# Patient Record
Sex: FEMALE | Race: WHITE | NOT HISPANIC OR LATINO | Employment: OTHER | URBAN - METROPOLITAN AREA
[De-identification: names, ages, dates, MRNs, and addresses within clinical notes are randomized per-mention and may not be internally consistent; named-entity substitution may affect disease eponyms.]

---

## 2016-12-30 ASSESSMENT — MIFFLIN-ST. JEOR: SCORE: 1388.94

## 2017-01-05 ENCOUNTER — SURGERY - HEALTHEAST (OUTPATIENT)
Dept: SURGERY | Facility: CLINIC | Age: 77
End: 2017-01-05

## 2017-01-05 ENCOUNTER — ANESTHESIA - HEALTHEAST (OUTPATIENT)
Dept: SURGERY | Facility: CLINIC | Age: 77
End: 2017-01-05

## 2017-01-05 ENCOUNTER — HOME CARE/HOSPICE - HEALTHEAST (OUTPATIENT)
Dept: HOME HEALTH SERVICES | Facility: HOME HEALTH | Age: 77
End: 2017-01-05

## 2017-01-05 ASSESSMENT — MIFFLIN-ST. JEOR: SCORE: 1388.94

## 2017-01-09 ENCOUNTER — OFFICE VISIT - HEALTHEAST (OUTPATIENT)
Dept: GERIATRICS | Facility: CLINIC | Age: 77
End: 2017-01-09

## 2017-01-09 DIAGNOSIS — D62 ACUTE BLOOD LOSS AS CAUSE OF POSTOPERATIVE ANEMIA: ICD-10-CM

## 2017-01-09 DIAGNOSIS — E78.5 HYPERLIPIDEMIA, UNSPECIFIED HYPERLIPIDEMIA TYPE: ICD-10-CM

## 2017-01-09 DIAGNOSIS — I10 ESSENTIAL HYPERTENSION WITH GOAL BLOOD PRESSURE LESS THAN 130/80: ICD-10-CM

## 2017-01-09 DIAGNOSIS — Z96.652 STATUS POST TOTAL LEFT KNEE REPLACEMENT: ICD-10-CM

## 2017-01-11 ENCOUNTER — OFFICE VISIT - HEALTHEAST (OUTPATIENT)
Dept: GERIATRICS | Facility: CLINIC | Age: 77
End: 2017-01-11

## 2017-01-11 DIAGNOSIS — Z96.652 STATUS POST TOTAL LEFT KNEE REPLACEMENT: ICD-10-CM

## 2017-01-11 DIAGNOSIS — D62 ACUTE BLOOD LOSS AS CAUSE OF POSTOPERATIVE ANEMIA: ICD-10-CM

## 2017-01-11 DIAGNOSIS — I10 ESSENTIAL HYPERTENSION WITH GOAL BLOOD PRESSURE LESS THAN 130/80: ICD-10-CM

## 2017-01-11 DIAGNOSIS — E78.5 HYPERLIPIDEMIA, UNSPECIFIED HYPERLIPIDEMIA TYPE: ICD-10-CM

## 2017-01-16 ENCOUNTER — AMBULATORY - HEALTHEAST (OUTPATIENT)
Dept: GERIATRICS | Facility: CLINIC | Age: 77
End: 2017-01-16

## 2017-06-19 ENCOUNTER — RECORDS - HEALTHEAST (OUTPATIENT)
Dept: LAB | Facility: CLINIC | Age: 77
End: 2017-06-19

## 2017-06-19 LAB
CHOLEST SERPL-MCNC: 215 MG/DL
FASTING STATUS PATIENT QL REPORTED: NO
HDLC SERPL-MCNC: 56 MG/DL
LDLC SERPL CALC-MCNC: 140 MG/DL
TRIGL SERPL-MCNC: 93 MG/DL

## 2017-06-21 LAB
LAB AP CHARGES (HE HISTORICAL CONVERSION): NORMAL
PATH REPORT.COMMENTS IMP SPEC: NORMAL
PATH REPORT.FINAL DX SPEC: NORMAL
PATH REPORT.GROSS SPEC: NORMAL
PATH REPORT.MICROSCOPIC SPEC OTHER STN: NORMAL
PATH REPORT.RELEVANT HX SPEC: NORMAL
RESULT FLAG (HE HISTORICAL CONVERSION): NORMAL

## 2017-08-25 ENCOUNTER — RECORDS - HEALTHEAST (OUTPATIENT)
Dept: LAB | Facility: CLINIC | Age: 77
End: 2017-08-25

## 2017-08-25 LAB
CHOLEST SERPL-MCNC: 149 MG/DL
FASTING STATUS PATIENT QL REPORTED: NO
HDLC SERPL-MCNC: 59 MG/DL
LDLC SERPL CALC-MCNC: 79 MG/DL
TRIGL SERPL-MCNC: 54 MG/DL

## 2017-08-30 ENCOUNTER — RECORDS - HEALTHEAST (OUTPATIENT)
Dept: ADMINISTRATIVE | Facility: OTHER | Age: 77
End: 2017-08-30

## 2018-01-08 ENCOUNTER — RECORDS - HEALTHEAST (OUTPATIENT)
Dept: LAB | Facility: CLINIC | Age: 78
End: 2018-01-08

## 2018-01-08 LAB
ALBUMIN SERPL-MCNC: 3.7 G/DL (ref 3.5–5)
ALP SERPL-CCNC: 71 U/L (ref 45–120)
ALT SERPL W P-5'-P-CCNC: 15 U/L (ref 0–45)
ANION GAP SERPL CALCULATED.3IONS-SCNC: 8 MMOL/L (ref 5–18)
AST SERPL W P-5'-P-CCNC: 16 U/L (ref 0–40)
BILIRUB SERPL-MCNC: 0.4 MG/DL (ref 0–1)
BUN SERPL-MCNC: 13 MG/DL (ref 8–28)
CALCIUM SERPL-MCNC: 9.8 MG/DL (ref 8.5–10.5)
CHLORIDE BLD-SCNC: 105 MMOL/L (ref 98–107)
CO2 SERPL-SCNC: 27 MMOL/L (ref 22–31)
CREAT SERPL-MCNC: 0.88 MG/DL (ref 0.6–1.1)
GFR SERPL CREATININE-BSD FRML MDRD: >60 ML/MIN/1.73M2
GLUCOSE BLD-MCNC: 101 MG/DL (ref 70–125)
POTASSIUM BLD-SCNC: 4.5 MMOL/L (ref 3.5–5)
PROT SERPL-MCNC: 6.8 G/DL (ref 6–8)
SODIUM SERPL-SCNC: 140 MMOL/L (ref 136–145)

## 2018-06-11 ENCOUNTER — RECORDS - HEALTHEAST (OUTPATIENT)
Dept: LAB | Facility: CLINIC | Age: 78
End: 2018-06-11

## 2018-06-11 LAB
ALBUMIN SERPL-MCNC: 4 G/DL (ref 3.5–5)
ALP SERPL-CCNC: 68 U/L (ref 45–120)
ALT SERPL W P-5'-P-CCNC: 13 U/L (ref 0–45)
ANION GAP SERPL CALCULATED.3IONS-SCNC: 10 MMOL/L (ref 5–18)
AST SERPL W P-5'-P-CCNC: 19 U/L (ref 0–40)
BILIRUB SERPL-MCNC: 0.5 MG/DL (ref 0–1)
BUN SERPL-MCNC: 14 MG/DL (ref 8–28)
CALCIUM SERPL-MCNC: 9.8 MG/DL (ref 8.5–10.5)
CHLORIDE BLD-SCNC: 105 MMOL/L (ref 98–107)
CO2 SERPL-SCNC: 24 MMOL/L (ref 22–31)
CREAT SERPL-MCNC: 0.93 MG/DL (ref 0.6–1.1)
GFR SERPL CREATININE-BSD FRML MDRD: 58 ML/MIN/1.73M2
GLUCOSE BLD-MCNC: 95 MG/DL (ref 70–125)
POTASSIUM BLD-SCNC: 4.4 MMOL/L (ref 3.5–5)
PROT SERPL-MCNC: 6.8 G/DL (ref 6–8)
SODIUM SERPL-SCNC: 139 MMOL/L (ref 136–145)

## 2019-02-25 ENCOUNTER — RECORDS - HEALTHEAST (OUTPATIENT)
Dept: ADMINISTRATIVE | Facility: OTHER | Age: 79
End: 2019-02-25

## 2019-02-25 LAB
LAB AP CHARGES (HE HISTORICAL CONVERSION): NORMAL
PATH REPORT.COMMENTS IMP SPEC: NORMAL
PATH REPORT.COMMENTS IMP SPEC: NORMAL
PATH REPORT.FINAL DX SPEC: NORMAL
PATH REPORT.GROSS SPEC: NORMAL
PATH REPORT.MICROSCOPIC SPEC OTHER STN: NORMAL
PATH REPORT.RELEVANT HX SPEC: NORMAL
RESULT FLAG (HE HISTORICAL CONVERSION): NORMAL

## 2019-05-21 ENCOUNTER — RECORDS - HEALTHEAST (OUTPATIENT)
Dept: LAB | Facility: CLINIC | Age: 79
End: 2019-05-21

## 2019-05-21 LAB
C REACTIVE PROTEIN LHE: <0.1 MG/DL (ref 0–0.8)
CK SERPL-CCNC: 96 U/L (ref 30–190)
ERYTHROCYTE [SEDIMENTATION RATE] IN BLOOD BY WESTERGREN METHOD: 7 MM/HR (ref 0–20)
TSH SERPL DL<=0.005 MIU/L-ACNC: 1.78 UIU/ML (ref 0.3–5)

## 2019-05-22 LAB — B BURGDOR IGG+IGM SER QL: 0.05 INDEX VALUE

## 2020-01-23 ENCOUNTER — RECORDS - HEALTHEAST (OUTPATIENT)
Dept: ADMINISTRATIVE | Facility: OTHER | Age: 80
End: 2020-01-23

## 2020-01-27 ENCOUNTER — HOSPITAL ENCOUNTER (OUTPATIENT)
Dept: CARDIOLOGY | Facility: CLINIC | Age: 80
Discharge: HOME | End: 2020-01-27
Attending: FAMILY MEDICINE

## 2020-01-27 DIAGNOSIS — R00.0 SINUS TACHYCARDIA: ICD-10-CM

## 2020-03-12 ENCOUNTER — RECORDS - HEALTHEAST (OUTPATIENT)
Dept: ADMINISTRATIVE | Facility: OTHER | Age: 80
End: 2020-03-12

## 2020-04-23 ENCOUNTER — RECORDS - HEALTHEAST (OUTPATIENT)
Dept: LAB | Facility: CLINIC | Age: 80
End: 2020-04-23

## 2020-04-23 LAB
ANION GAP SERPL CALCULATED.3IONS-SCNC: 8 MMOL/L (ref 5–18)
BUN SERPL-MCNC: 9 MG/DL (ref 8–28)
CALCIUM SERPL-MCNC: 9.6 MG/DL (ref 8.5–10.5)
CHLORIDE BLD-SCNC: 103 MMOL/L (ref 98–107)
CO2 SERPL-SCNC: 28 MMOL/L (ref 22–31)
CREAT SERPL-MCNC: 0.89 MG/DL (ref 0.6–1.1)
GFR SERPL CREATININE-BSD FRML MDRD: >60 ML/MIN/1.73M2
GLUCOSE BLD-MCNC: 102 MG/DL (ref 70–125)
POTASSIUM BLD-SCNC: 4.8 MMOL/L (ref 3.5–5)
SODIUM SERPL-SCNC: 139 MMOL/L (ref 136–145)
VIT B12 SERPL-MCNC: 522 PG/ML (ref 213–816)

## 2020-05-18 ENCOUNTER — AMBULATORY - HEALTHEAST (OUTPATIENT)
Dept: CARDIOLOGY | Facility: CLINIC | Age: 80
End: 2020-05-18

## 2020-05-18 ENCOUNTER — RECORDS - HEALTHEAST (OUTPATIENT)
Dept: ADMINISTRATIVE | Facility: OTHER | Age: 80
End: 2020-05-18

## 2020-05-19 ENCOUNTER — RECORDS - HEALTHEAST (OUTPATIENT)
Dept: ADMINISTRATIVE | Facility: OTHER | Age: 80
End: 2020-05-19

## 2020-05-19 ENCOUNTER — AMBULATORY - HEALTHEAST (OUTPATIENT)
Dept: CARDIOLOGY | Facility: CLINIC | Age: 80
End: 2020-05-19

## 2020-05-22 ENCOUNTER — COMMUNICATION - HEALTHEAST (OUTPATIENT)
Dept: NEUROSURGERY | Facility: CLINIC | Age: 80
End: 2020-05-22

## 2020-05-26 ENCOUNTER — OFFICE VISIT - HEALTHEAST (OUTPATIENT)
Dept: CARDIOLOGY | Facility: CLINIC | Age: 80
End: 2020-05-26

## 2020-05-26 DIAGNOSIS — R55 SYNCOPE: ICD-10-CM

## 2020-06-05 ENCOUNTER — COMMUNICATION - HEALTHEAST (OUTPATIENT)
Dept: CARDIOLOGY | Facility: CLINIC | Age: 80
End: 2020-06-05

## 2020-06-09 ENCOUNTER — HOSPITAL ENCOUNTER (OUTPATIENT)
Dept: CARDIOLOGY | Facility: CLINIC | Age: 80
Discharge: HOME | End: 2020-06-09
Attending: INTERNAL MEDICINE

## 2020-06-09 ENCOUNTER — COMMUNICATION - HEALTHEAST (OUTPATIENT)
Dept: CARDIOLOGY | Facility: CLINIC | Age: 80
End: 2020-06-09

## 2020-06-09 ENCOUNTER — RECORDS - HEALTHEAST (OUTPATIENT)
Dept: ADMINISTRATIVE | Facility: OTHER | Age: 80
End: 2020-06-09

## 2020-06-09 DIAGNOSIS — R55 SYNCOPE: ICD-10-CM

## 2020-06-09 LAB
AORTIC ROOT: 3.6 CM
AR DECEL SLOPE: 2320 MM/S2
AR PEAK VELOCITY: 493 CM/S
AV REGURGITANT PEAK GRADIENT: 97.2 MMHG
AV REGURGITATION PRESSURE HALF TIME: 468 MS
BSA FOR ECHO PROCEDURE: 1.93 M2
CV BLOOD PRESSURE: ABNORMAL MMHG
CV ECHO HEIGHT: 68 IN
CV ECHO WEIGHT: 172 LBS
DOP CALC LVOT AREA: 2.54 CM2
DOP CALC LVOT DIAMETER: 1.8 CM
DOP CALC LVOT PEAK VEL: 91.1 CM/S
DOP CALC LVOT STROKE VOLUME: 48.3 CM3
DOP CALCLVOT PEAK VEL VTI: 19 CM
EJECTION FRACTION: 63 % (ref 55–75)
FRACTIONAL SHORTENING: 28.7 % (ref 28–44)
INTERVENTRICULAR SEPTUM IN END DIASTOLE: 1.01 CM (ref 0.6–0.9)
IVS/PW RATIO: 1
LA AREA 1: 16.4 CM2
LA AREA 2: 12.9 CM2
LEFT ATRIUM LENGTH: 4 CM
LEFT ATRIUM SIZE: 3.3 CM
LEFT ATRIUM TO AORTIC ROOT RATIO: 0.92 NO UNITS
LEFT ATRIUM VOLUME INDEX: 23.3 ML/M2
LEFT ATRIUM VOLUME: 45 ML
LEFT VENTRICLE DIASTOLIC VOLUME INDEX: 40.4 CM3/M2 (ref 29–61)
LEFT VENTRICLE DIASTOLIC VOLUME: 78 CM3 (ref 46–106)
LEFT VENTRICLE MASS INDEX: 69.7 G/M2
LEFT VENTRICLE SYSTOLIC VOLUME INDEX: 15 CM3/M2 (ref 8–24)
LEFT VENTRICLE SYSTOLIC VOLUME: 29 CM3 (ref 14–42)
LEFT VENTRICULAR INTERNAL DIMENSION IN DIASTOLE: 4.18 CM (ref 3.8–5.2)
LEFT VENTRICULAR INTERNAL DIMENSION IN SYSTOLE: 2.98 CM (ref 2.2–3.5)
LEFT VENTRICULAR MASS: 134.5 G
LEFT VENTRICULAR OUTFLOW TRACT MEAN GRADIENT: 2 MMHG
LEFT VENTRICULAR OUTFLOW TRACT MEAN VELOCITY: 57.6 CM/S
LEFT VENTRICULAR OUTFLOW TRACT PEAK GRADIENT: 3 MMHG
LEFT VENTRICULAR POSTERIOR WALL IN END DIASTOLE: 0.97 CM (ref 0.6–0.9)
LV STROKE VOLUME INDEX: 25 ML/M2
MITRAL VALVE E/A RATIO: 0.9
MV AVERAGE E/E' RATIO: 8.7 CM/S
MV DECELERATION TIME: 236 MS
MV E'TISSUE VEL-LAT: 8.38 CM/S
MV E'TISSUE VEL-MED: 6.24 CM/S
MV LATERAL E/E' RATIO: 7.6
MV MEDIAL E/E' RATIO: 10.2
MV PEAK A VELOCITY: 67.6 CM/S
MV PEAK E VELOCITY: 63.7 CM/S
NUC REST DIASTOLIC VOLUME INDEX: 2752 LBS
NUC REST SYSTOLIC VOLUME INDEX: 68 IN
TRICUSPID REGURGITATION PEAK PRESSURE GRADIENT: 27.5 MMHG
TRICUSPID VALVE ANULAR PLANE SYSTOLIC EXCURSION: 2.6 CM
TRICUSPID VALVE PEAK REGURGITANT VELOCITY: 262 CM/S

## 2020-06-09 ASSESSMENT — MIFFLIN-ST. JEOR: SCORE: 1293.69

## 2020-06-11 ENCOUNTER — COMMUNICATION - HEALTHEAST (OUTPATIENT)
Dept: CARDIOLOGY | Facility: CLINIC | Age: 80
End: 2020-06-11

## 2020-06-15 ENCOUNTER — COMMUNICATION - HEALTHEAST (OUTPATIENT)
Dept: CARDIOLOGY | Facility: CLINIC | Age: 80
End: 2020-06-15

## 2020-06-17 ENCOUNTER — OFFICE VISIT - HEALTHEAST (OUTPATIENT)
Dept: CARDIOLOGY | Facility: CLINIC | Age: 80
End: 2020-06-17

## 2020-06-17 DIAGNOSIS — R42 DIZZINESS: ICD-10-CM

## 2020-06-17 DIAGNOSIS — I35.1 AORTIC VALVE INSUFFICIENCY, ETIOLOGY OF CARDIAC VALVE DISEASE UNSPECIFIED: ICD-10-CM

## 2020-06-17 DIAGNOSIS — I10 ESSENTIAL HYPERTENSION WITH GOAL BLOOD PRESSURE LESS THAN 130/80: ICD-10-CM

## 2020-06-22 ENCOUNTER — AMBULATORY - HEALTHEAST (OUTPATIENT)
Dept: OTHER | Facility: CLINIC | Age: 80
End: 2020-06-22

## 2020-06-26 ENCOUNTER — OFFICE VISIT - HEALTHEAST (OUTPATIENT)
Dept: GERIATRICS | Facility: CLINIC | Age: 80
End: 2020-06-26

## 2020-06-26 DIAGNOSIS — G93.41 METABOLIC ENCEPHALOPATHY: ICD-10-CM

## 2020-06-26 DIAGNOSIS — R26.89 SHUFFLING GAIT: ICD-10-CM

## 2020-06-26 DIAGNOSIS — M48.00 SPINAL STENOSIS, UNSPECIFIED SPINAL REGION: ICD-10-CM

## 2020-06-26 DIAGNOSIS — R33.9 URINARY RETENTION: ICD-10-CM

## 2020-06-26 DIAGNOSIS — E53.8 VITAMIN B12 DEFICIENCY: ICD-10-CM

## 2020-06-30 ENCOUNTER — RECORDS - HEALTHEAST (OUTPATIENT)
Dept: LAB | Facility: CLINIC | Age: 80
End: 2020-06-30

## 2020-06-30 ENCOUNTER — OFFICE VISIT - HEALTHEAST (OUTPATIENT)
Dept: GERIATRICS | Facility: CLINIC | Age: 80
End: 2020-06-30

## 2020-06-30 DIAGNOSIS — E53.8 VITAMIN B12 DEFICIENCY: ICD-10-CM

## 2020-06-30 DIAGNOSIS — G93.41 METABOLIC ENCEPHALOPATHY: ICD-10-CM

## 2020-06-30 DIAGNOSIS — N30.01 ACUTE CYSTITIS WITH HEMATURIA: ICD-10-CM

## 2020-06-30 DIAGNOSIS — R33.9 URINARY RETENTION: ICD-10-CM

## 2020-06-30 DIAGNOSIS — M48.00 SPINAL STENOSIS, UNSPECIFIED SPINAL REGION: ICD-10-CM

## 2020-06-30 DIAGNOSIS — R26.89 SHUFFLING GAIT: ICD-10-CM

## 2020-06-30 LAB
ALBUMIN UR-MCNC: ABNORMAL MG/DL
ANION GAP SERPL CALCULATED.3IONS-SCNC: 8 MMOL/L (ref 5–18)
APPEARANCE UR: ABNORMAL
BACTERIA #/AREA URNS HPF: ABNORMAL HPF
BILIRUB UR QL STRIP: NEGATIVE
BUN SERPL-MCNC: 16 MG/DL (ref 8–28)
CALCIUM SERPL-MCNC: 9 MG/DL (ref 8.5–10.5)
CAOX CRY #/AREA URNS HPF: PRESENT /[HPF]
CHLORIDE BLD-SCNC: 107 MMOL/L (ref 98–107)
CO2 SERPL-SCNC: 26 MMOL/L (ref 22–31)
COLOR UR AUTO: YELLOW
CREAT SERPL-MCNC: 0.88 MG/DL (ref 0.6–1.1)
ERYTHROCYTE [DISTWIDTH] IN BLOOD BY AUTOMATED COUNT: 12.3 % (ref 11–14.5)
GFR SERPL CREATININE-BSD FRML MDRD: >60 ML/MIN/1.73M2
GLUCOSE BLD-MCNC: 91 MG/DL (ref 70–125)
GLUCOSE UR STRIP-MCNC: NEGATIVE MG/DL
HCT VFR BLD AUTO: 37.2 % (ref 35–47)
HGB BLD-MCNC: 11.7 G/DL (ref 12–16)
HGB UR QL STRIP: ABNORMAL
KETONES UR STRIP-MCNC: NEGATIVE MG/DL
LEUKOCYTE ESTERASE UR QL STRIP: ABNORMAL
MCH RBC QN AUTO: 30.7 PG (ref 27–34)
MCHC RBC AUTO-ENTMCNC: 31.5 G/DL (ref 32–36)
MCV RBC AUTO: 98 FL (ref 80–100)
MUCOUS THREADS #/AREA URNS LPF: ABNORMAL LPF
NITRATE UR QL: POSITIVE
PH UR STRIP: 5.5 [PH] (ref 4.5–8)
PLATELET # BLD AUTO: 234 THOU/UL (ref 140–440)
PMV BLD AUTO: 10 FL (ref 8.5–12.5)
POTASSIUM BLD-SCNC: 3.9 MMOL/L (ref 3.5–5)
RBC # BLD AUTO: 3.81 MILL/UL (ref 3.8–5.4)
RBC #/AREA URNS AUTO: ABNORMAL HPF
SODIUM SERPL-SCNC: 141 MMOL/L (ref 136–145)
SP GR UR STRIP: 1.02 (ref 1–1.03)
SQUAMOUS #/AREA URNS AUTO: ABNORMAL LPF
UROBILINOGEN UR STRIP-ACNC: ABNORMAL
WBC #/AREA URNS AUTO: ABNORMAL HPF
WBC CLUMPS #/AREA URNS HPF: PRESENT /[HPF]
WBC: 8.6 THOU/UL (ref 4–11)

## 2020-07-01 ENCOUNTER — RECORDS - HEALTHEAST (OUTPATIENT)
Dept: LAB | Facility: CLINIC | Age: 80
End: 2020-07-01

## 2020-07-01 LAB
ALBUMIN UR-MCNC: ABNORMAL MG/DL
APPEARANCE UR: ABNORMAL
BACTERIA #/AREA URNS HPF: ABNORMAL HPF
BACTERIA SPEC CULT: ABNORMAL
BILIRUB UR QL STRIP: NEGATIVE
COLOR UR AUTO: YELLOW
GLUCOSE UR STRIP-MCNC: NEGATIVE MG/DL
HGB UR QL STRIP: ABNORMAL
KETONES UR STRIP-MCNC: NEGATIVE MG/DL
LEUKOCYTE ESTERASE UR QL STRIP: ABNORMAL
MUCOUS THREADS #/AREA URNS LPF: ABNORMAL LPF
NITRATE UR QL: NEGATIVE
PH UR STRIP: 8 [PH] (ref 4.5–8)
RBC #/AREA URNS AUTO: ABNORMAL HPF
SP GR UR STRIP: 1.02 (ref 1–1.03)
SQUAMOUS #/AREA URNS AUTO: ABNORMAL LPF
UROBILINOGEN UR STRIP-ACNC: ABNORMAL
WBC #/AREA URNS AUTO: ABNORMAL HPF

## 2020-07-02 ENCOUNTER — OFFICE VISIT - HEALTHEAST (OUTPATIENT)
Dept: GERIATRICS | Facility: CLINIC | Age: 80
End: 2020-07-02

## 2020-07-02 DIAGNOSIS — N30.01 ACUTE CYSTITIS WITH HEMATURIA: ICD-10-CM

## 2020-07-02 DIAGNOSIS — G93.41 METABOLIC ENCEPHALOPATHY: ICD-10-CM

## 2020-07-02 DIAGNOSIS — R33.9 URINARY RETENTION: ICD-10-CM

## 2020-07-02 DIAGNOSIS — R26.89 SHUFFLING GAIT: ICD-10-CM

## 2020-07-02 DIAGNOSIS — M48.00 SPINAL STENOSIS, UNSPECIFIED SPINAL REGION: ICD-10-CM

## 2020-07-02 DIAGNOSIS — E53.8 VITAMIN B12 DEFICIENCY: ICD-10-CM

## 2020-07-02 LAB
ANION GAP SERPL CALCULATED.3IONS-SCNC: 7 MMOL/L (ref 5–18)
BUN SERPL-MCNC: 18 MG/DL (ref 8–28)
CALCIUM SERPL-MCNC: 9.4 MG/DL (ref 8.5–10.5)
CHLORIDE BLD-SCNC: 106 MMOL/L (ref 98–107)
CO2 SERPL-SCNC: 27 MMOL/L (ref 22–31)
CREAT SERPL-MCNC: 0.84 MG/DL (ref 0.6–1.1)
ERYTHROCYTE [DISTWIDTH] IN BLOOD BY AUTOMATED COUNT: 12.1 % (ref 11–14.5)
GFR SERPL CREATININE-BSD FRML MDRD: >60 ML/MIN/1.73M2
GLUCOSE BLD-MCNC: 88 MG/DL (ref 70–125)
HCT VFR BLD AUTO: 37.2 % (ref 35–47)
HGB BLD-MCNC: 11.7 G/DL (ref 12–16)
MCH RBC QN AUTO: 30.7 PG (ref 27–34)
MCHC RBC AUTO-ENTMCNC: 31.5 G/DL (ref 32–36)
MCV RBC AUTO: 98 FL (ref 80–100)
PLATELET # BLD AUTO: 251 THOU/UL (ref 140–440)
PMV BLD AUTO: 10.1 FL (ref 8.5–12.5)
POTASSIUM BLD-SCNC: 4.2 MMOL/L (ref 3.5–5)
RBC # BLD AUTO: 3.81 MILL/UL (ref 3.8–5.4)
SODIUM SERPL-SCNC: 140 MMOL/L (ref 136–145)
WBC: 7.3 THOU/UL (ref 4–11)

## 2020-07-04 LAB — BACTERIA SPEC CULT: ABNORMAL

## 2020-07-07 ENCOUNTER — OFFICE VISIT - HEALTHEAST (OUTPATIENT)
Dept: GERIATRICS | Facility: CLINIC | Age: 80
End: 2020-07-07

## 2020-07-07 ENCOUNTER — OFFICE VISIT - HEALTHEAST (OUTPATIENT)
Dept: CARDIOLOGY | Facility: CLINIC | Age: 80
End: 2020-07-07

## 2020-07-07 DIAGNOSIS — R42 DIZZINESS: ICD-10-CM

## 2020-07-07 DIAGNOSIS — R53.1 WEAKNESS: ICD-10-CM

## 2020-07-07 DIAGNOSIS — R33.9 URINARY RETENTION: ICD-10-CM

## 2020-07-07 DIAGNOSIS — R26.89 SHUFFLING GAIT: ICD-10-CM

## 2020-07-07 DIAGNOSIS — N30.01 ACUTE CYSTITIS WITH HEMATURIA: ICD-10-CM

## 2020-07-07 DIAGNOSIS — E53.8 VITAMIN B12 DEFICIENCY: ICD-10-CM

## 2020-07-07 DIAGNOSIS — M48.00 SPINAL STENOSIS, UNSPECIFIED SPINAL REGION: ICD-10-CM

## 2020-07-07 DIAGNOSIS — G93.41 METABOLIC ENCEPHALOPATHY: ICD-10-CM

## 2020-07-09 ENCOUNTER — OFFICE VISIT - HEALTHEAST (OUTPATIENT)
Dept: GERIATRICS | Facility: CLINIC | Age: 80
End: 2020-07-09

## 2020-07-09 DIAGNOSIS — E53.8 VITAMIN B12 DEFICIENCY: ICD-10-CM

## 2020-07-09 DIAGNOSIS — R26.89 SHUFFLING GAIT: ICD-10-CM

## 2020-07-09 DIAGNOSIS — G93.41 METABOLIC ENCEPHALOPATHY: ICD-10-CM

## 2020-07-09 DIAGNOSIS — R33.9 URINARY RETENTION: ICD-10-CM

## 2020-07-09 DIAGNOSIS — M48.00 SPINAL STENOSIS, UNSPECIFIED SPINAL REGION: ICD-10-CM

## 2020-07-09 DIAGNOSIS — N30.01 ACUTE CYSTITIS WITH HEMATURIA: ICD-10-CM

## 2020-07-12 ENCOUNTER — RECORDS - HEALTHEAST (OUTPATIENT)
Dept: LAB | Facility: CLINIC | Age: 80
End: 2020-07-12

## 2020-07-12 LAB
ALBUMIN UR-MCNC: NEGATIVE MG/DL
APPEARANCE UR: CLEAR
BILIRUB UR QL STRIP: NEGATIVE
COLOR UR AUTO: NORMAL
GLUCOSE UR STRIP-MCNC: NEGATIVE MG/DL
HGB UR QL STRIP: NEGATIVE
KETONES UR STRIP-MCNC: NEGATIVE MG/DL
LEUKOCYTE ESTERASE UR QL STRIP: NEGATIVE
NITRATE UR QL: NEGATIVE
PH UR STRIP: 6 [PH] (ref 4.5–8)
SP GR UR STRIP: 1 (ref 1–1.03)
UROBILINOGEN UR STRIP-ACNC: NORMAL

## 2020-07-14 ENCOUNTER — OFFICE VISIT - HEALTHEAST (OUTPATIENT)
Dept: GERIATRICS | Facility: CLINIC | Age: 80
End: 2020-07-14

## 2020-07-14 DIAGNOSIS — G20.A1 PARKINSON'S DISEASE (H): ICD-10-CM

## 2020-07-14 DIAGNOSIS — E53.8 VITAMIN B12 DEFICIENCY: ICD-10-CM

## 2020-07-14 DIAGNOSIS — R33.9 URINARY RETENTION: ICD-10-CM

## 2020-07-14 DIAGNOSIS — N30.01 ACUTE CYSTITIS WITH HEMATURIA: ICD-10-CM

## 2020-07-14 DIAGNOSIS — G93.41 METABOLIC ENCEPHALOPATHY: ICD-10-CM

## 2020-07-14 DIAGNOSIS — M48.00 SPINAL STENOSIS, UNSPECIFIED SPINAL REGION: ICD-10-CM

## 2020-07-14 RX ORDER — CARBIDOPA AND LEVODOPA 25; 100 MG/1; MG/1
1 TABLET ORAL 2 TIMES DAILY
Status: SHIPPED | COMMUNITY
Start: 2020-07-14

## 2020-07-16 ENCOUNTER — RECORDS - HEALTHEAST (OUTPATIENT)
Dept: LAB | Facility: CLINIC | Age: 80
End: 2020-07-16

## 2020-07-16 ENCOUNTER — AMBULATORY - HEALTHEAST (OUTPATIENT)
Dept: OTHER | Facility: CLINIC | Age: 80
End: 2020-07-16

## 2020-07-16 ENCOUNTER — OFFICE VISIT - HEALTHEAST (OUTPATIENT)
Dept: GERIATRICS | Facility: CLINIC | Age: 80
End: 2020-07-16

## 2020-07-16 DIAGNOSIS — R33.9 URINARY RETENTION: ICD-10-CM

## 2020-07-16 DIAGNOSIS — G20.A1 PARKINSON'S DISEASE (H): ICD-10-CM

## 2020-07-16 DIAGNOSIS — N30.01 ACUTE CYSTITIS WITH HEMATURIA: ICD-10-CM

## 2020-07-16 DIAGNOSIS — G93.41 METABOLIC ENCEPHALOPATHY: ICD-10-CM

## 2020-07-16 DIAGNOSIS — M48.00 SPINAL STENOSIS, UNSPECIFIED SPINAL REGION: ICD-10-CM

## 2020-07-16 DIAGNOSIS — I95.1 ORTHOSTATIC HYPOTENSION: ICD-10-CM

## 2020-07-16 LAB
ANION GAP SERPL CALCULATED.3IONS-SCNC: 8 MMOL/L (ref 5–18)
BASOPHILS # BLD AUTO: 0 THOU/UL (ref 0–0.2)
BASOPHILS NFR BLD AUTO: 0 % (ref 0–2)
BUN SERPL-MCNC: 9 MG/DL (ref 8–28)
CALCIUM SERPL-MCNC: 9.1 MG/DL (ref 8.5–10.5)
CHLORIDE BLD-SCNC: 105 MMOL/L (ref 98–107)
CO2 SERPL-SCNC: 26 MMOL/L (ref 22–31)
CREAT SERPL-MCNC: 0.85 MG/DL (ref 0.6–1.1)
EOSINOPHIL # BLD AUTO: 0.1 THOU/UL (ref 0–0.4)
EOSINOPHIL NFR BLD AUTO: 1 % (ref 0–6)
ERYTHROCYTE [DISTWIDTH] IN BLOOD BY AUTOMATED COUNT: 12.3 % (ref 11–14.5)
GFR SERPL CREATININE-BSD FRML MDRD: >60 ML/MIN/1.73M2
GLUCOSE BLD-MCNC: 100 MG/DL (ref 70–125)
HCT VFR BLD AUTO: 38.9 % (ref 35–47)
HGB BLD-MCNC: 12.7 G/DL (ref 12–16)
LYMPHOCYTES # BLD AUTO: 1 THOU/UL (ref 0.8–4.4)
LYMPHOCYTES NFR BLD AUTO: 16 % (ref 20–40)
MCH RBC QN AUTO: 31.3 PG (ref 27–34)
MCHC RBC AUTO-ENTMCNC: 32.6 G/DL (ref 32–36)
MCV RBC AUTO: 96 FL (ref 80–100)
MONOCYTES # BLD AUTO: 0.6 THOU/UL (ref 0–0.9)
MONOCYTES NFR BLD AUTO: 11 % (ref 2–10)
NEUTROPHILS # BLD AUTO: 4.2 THOU/UL (ref 2–7.7)
NEUTROPHILS NFR BLD AUTO: 71 % (ref 50–70)
PLATELET # BLD AUTO: 282 THOU/UL (ref 140–440)
PMV BLD AUTO: 10.5 FL (ref 8.5–12.5)
POTASSIUM BLD-SCNC: 4.4 MMOL/L (ref 3.5–5)
RBC # BLD AUTO: 4.06 MILL/UL (ref 3.8–5.4)
SODIUM SERPL-SCNC: 139 MMOL/L (ref 136–145)
WBC: 5.9 THOU/UL (ref 4–11)

## 2020-07-21 ENCOUNTER — OFFICE VISIT - HEALTHEAST (OUTPATIENT)
Dept: GERIATRICS | Facility: CLINIC | Age: 80
End: 2020-07-21

## 2020-07-21 DIAGNOSIS — R33.9 URINARY RETENTION: ICD-10-CM

## 2020-07-21 DIAGNOSIS — I95.1 ORTHOSTATIC HYPOTENSION: ICD-10-CM

## 2020-07-21 DIAGNOSIS — G20.A1 PARKINSON DISEASE (H): ICD-10-CM

## 2020-07-21 DIAGNOSIS — N30.01 ACUTE CYSTITIS WITH HEMATURIA: ICD-10-CM

## 2020-07-21 DIAGNOSIS — G93.41 METABOLIC ENCEPHALOPATHY: ICD-10-CM

## 2020-07-21 DIAGNOSIS — M48.00 SPINAL STENOSIS, UNSPECIFIED SPINAL REGION: ICD-10-CM

## 2020-07-22 ENCOUNTER — RECORDS - HEALTHEAST (OUTPATIENT)
Dept: LAB | Facility: CLINIC | Age: 80
End: 2020-07-22

## 2020-07-23 ENCOUNTER — OFFICE VISIT - HEALTHEAST (OUTPATIENT)
Dept: GERIATRICS | Facility: CLINIC | Age: 80
End: 2020-07-23

## 2020-07-23 DIAGNOSIS — M48.00 SPINAL STENOSIS, UNSPECIFIED SPINAL REGION: ICD-10-CM

## 2020-07-23 DIAGNOSIS — R33.9 URINARY RETENTION: ICD-10-CM

## 2020-07-23 DIAGNOSIS — G93.41 METABOLIC ENCEPHALOPATHY: ICD-10-CM

## 2020-07-23 DIAGNOSIS — G20.A1 PARKINSON DISEASE (H): ICD-10-CM

## 2020-07-23 DIAGNOSIS — N30.01 ACUTE CYSTITIS WITH HEMATURIA: ICD-10-CM

## 2020-07-23 DIAGNOSIS — I95.1 ORTHOSTATIC HYPOTENSION: ICD-10-CM

## 2020-07-23 LAB
ANION GAP SERPL CALCULATED.3IONS-SCNC: 6 MMOL/L (ref 5–18)
BUN SERPL-MCNC: 13 MG/DL (ref 8–28)
CALCIUM SERPL-MCNC: 9.3 MG/DL (ref 8.5–10.5)
CHLORIDE BLD-SCNC: 105 MMOL/L (ref 98–107)
CO2 SERPL-SCNC: 27 MMOL/L (ref 22–31)
CREAT SERPL-MCNC: 0.81 MG/DL (ref 0.6–1.1)
ERYTHROCYTE [DISTWIDTH] IN BLOOD BY AUTOMATED COUNT: 12.6 % (ref 11–14.5)
GFR SERPL CREATININE-BSD FRML MDRD: >60 ML/MIN/1.73M2
GLUCOSE BLD-MCNC: 86 MG/DL (ref 70–125)
HCT VFR BLD AUTO: 39 % (ref 35–47)
HGB BLD-MCNC: 12.6 G/DL (ref 12–16)
MCH RBC QN AUTO: 31 PG (ref 27–34)
MCHC RBC AUTO-ENTMCNC: 32.3 G/DL (ref 32–36)
MCV RBC AUTO: 96 FL (ref 80–100)
PLATELET # BLD AUTO: 246 THOU/UL (ref 140–440)
PMV BLD AUTO: 10.6 FL (ref 8.5–12.5)
POTASSIUM BLD-SCNC: 4.2 MMOL/L (ref 3.5–5)
RBC # BLD AUTO: 4.06 MILL/UL (ref 3.8–5.4)
SODIUM SERPL-SCNC: 138 MMOL/L (ref 136–145)
WBC: 5.2 THOU/UL (ref 4–11)

## 2020-07-24 ENCOUNTER — AMBULATORY - HEALTHEAST (OUTPATIENT)
Dept: GERIATRICS | Facility: CLINIC | Age: 80
End: 2020-07-24

## 2020-07-29 ENCOUNTER — RECORDS - HEALTHEAST (OUTPATIENT)
Dept: ADMINISTRATIVE | Facility: OTHER | Age: 80
End: 2020-07-29

## 2020-08-03 ENCOUNTER — RECORDS - HEALTHEAST (OUTPATIENT)
Dept: LAB | Facility: CLINIC | Age: 80
End: 2020-08-03

## 2020-08-04 LAB — BACTERIA SPEC CULT: NO GROWTH

## 2020-08-12 ENCOUNTER — RECORDS - HEALTHEAST (OUTPATIENT)
Dept: LAB | Facility: CLINIC | Age: 80
End: 2020-08-12

## 2020-08-12 LAB
ALBUMIN SERPL-MCNC: 4 G/DL (ref 3.5–5)
ALP SERPL-CCNC: 65 U/L (ref 45–120)
ALT SERPL W P-5'-P-CCNC: 10 U/L (ref 0–45)
ANION GAP SERPL CALCULATED.3IONS-SCNC: 11 MMOL/L (ref 5–18)
AST SERPL W P-5'-P-CCNC: 15 U/L (ref 0–40)
BILIRUB SERPL-MCNC: 0.4 MG/DL (ref 0–1)
BUN SERPL-MCNC: 14 MG/DL (ref 8–28)
CALCIUM SERPL-MCNC: 9.7 MG/DL (ref 8.5–10.5)
CHLORIDE BLD-SCNC: 104 MMOL/L (ref 98–107)
CHOLEST SERPL-MCNC: 204 MG/DL
CO2 SERPL-SCNC: 24 MMOL/L (ref 22–31)
CREAT SERPL-MCNC: 0.86 MG/DL (ref 0.6–1.1)
FASTING STATUS PATIENT QL REPORTED: ABNORMAL
GFR SERPL CREATININE-BSD FRML MDRD: >60 ML/MIN/1.73M2
GLUCOSE BLD-MCNC: 100 MG/DL (ref 70–125)
HDLC SERPL-MCNC: 66 MG/DL
LDLC SERPL CALC-MCNC: 123 MG/DL
POTASSIUM BLD-SCNC: 5.3 MMOL/L (ref 3.5–5)
PROT SERPL-MCNC: 6.9 G/DL (ref 6–8)
SODIUM SERPL-SCNC: 139 MMOL/L (ref 136–145)
TRIGL SERPL-MCNC: 77 MG/DL
TSH SERPL DL<=0.005 MIU/L-ACNC: 1.28 UIU/ML (ref 0.3–5)

## 2020-12-07 ENCOUNTER — COMMUNICATION - HEALTHEAST (OUTPATIENT)
Dept: CARDIOLOGY | Facility: CLINIC | Age: 80
End: 2020-12-07

## 2020-12-07 DIAGNOSIS — I10 HTN (HYPERTENSION): ICD-10-CM

## 2021-01-12 ENCOUNTER — COMMUNICATION - HEALTHEAST (OUTPATIENT)
Dept: CARDIOLOGY | Facility: CLINIC | Age: 81
End: 2021-01-12

## 2021-01-22 ENCOUNTER — COMMUNICATION - HEALTHEAST (OUTPATIENT)
Dept: CARDIOLOGY | Facility: CLINIC | Age: 81
End: 2021-01-22

## 2021-01-23 ENCOUNTER — AMBULATORY - HEALTHEAST (OUTPATIENT)
Dept: CARDIOLOGY | Facility: CLINIC | Age: 81
End: 2021-01-23

## 2021-02-09 ENCOUNTER — RECORDS - HEALTHEAST (OUTPATIENT)
Dept: ADMINISTRATIVE | Facility: OTHER | Age: 81
End: 2021-02-09

## 2021-02-09 ENCOUNTER — AMBULATORY - HEALTHEAST (OUTPATIENT)
Dept: CARDIOLOGY | Facility: CLINIC | Age: 81
End: 2021-02-09

## 2021-02-11 ENCOUNTER — AMBULATORY - HEALTHEAST (OUTPATIENT)
Dept: CARDIOLOGY | Facility: CLINIC | Age: 81
End: 2021-02-11

## 2021-02-11 ENCOUNTER — OFFICE VISIT - HEALTHEAST (OUTPATIENT)
Dept: CARDIOLOGY | Facility: CLINIC | Age: 81
End: 2021-02-11

## 2021-02-11 ENCOUNTER — COMMUNICATION - HEALTHEAST (OUTPATIENT)
Dept: CARDIOLOGY | Facility: CLINIC | Age: 81
End: 2021-02-11

## 2021-02-11 DIAGNOSIS — R55 SYNCOPE: ICD-10-CM

## 2021-02-11 DIAGNOSIS — R42 DIZZINESS: ICD-10-CM

## 2021-02-16 ENCOUNTER — HOSPITAL ENCOUNTER (OUTPATIENT)
Dept: CARDIOLOGY | Facility: HOSPITAL | Age: 81
Discharge: HOME | End: 2021-02-16
Attending: INTERNAL MEDICINE

## 2021-02-16 ENCOUNTER — COMMUNICATION - HEALTHEAST (OUTPATIENT)
Dept: CARDIOLOGY | Facility: CLINIC | Age: 81
End: 2021-02-16

## 2021-02-16 DIAGNOSIS — R55 SYNCOPE: ICD-10-CM

## 2021-04-12 ENCOUNTER — RECORDS - HEALTHEAST (OUTPATIENT)
Dept: LAB | Facility: CLINIC | Age: 81
End: 2021-04-12

## 2021-04-13 LAB — BACTERIA SPEC CULT: NORMAL

## 2021-04-16 ENCOUNTER — COMMUNICATION - HEALTHEAST (OUTPATIENT)
Dept: CARDIOLOGY | Facility: CLINIC | Age: 81
End: 2021-04-16

## 2021-05-10 ENCOUNTER — RECORDS - HEALTHEAST (OUTPATIENT)
Dept: LAB | Facility: CLINIC | Age: 81
End: 2021-05-10

## 2021-05-10 LAB
ALBUMIN SERPL-MCNC: 3.7 G/DL (ref 3.5–5)
ALP SERPL-CCNC: 67 U/L (ref 45–120)
ALT SERPL W P-5'-P-CCNC: <9 U/L (ref 0–45)
ANION GAP SERPL CALCULATED.3IONS-SCNC: 10 MMOL/L (ref 5–18)
AST SERPL W P-5'-P-CCNC: 10 U/L (ref 0–40)
BILIRUB SERPL-MCNC: 0.5 MG/DL (ref 0–1)
BUN SERPL-MCNC: 18 MG/DL (ref 8–28)
CALCIUM SERPL-MCNC: 9.3 MG/DL (ref 8.5–10.5)
CHLORIDE BLD-SCNC: 106 MMOL/L (ref 98–107)
CO2 SERPL-SCNC: 25 MMOL/L (ref 22–31)
CREAT SERPL-MCNC: 0.9 MG/DL (ref 0.6–1.1)
ERYTHROCYTE [DISTWIDTH] IN BLOOD BY AUTOMATED COUNT: 12.7 % (ref 11–14.5)
GFR SERPL CREATININE-BSD FRML MDRD: 60 ML/MIN/1.73M2
GLUCOSE BLD-MCNC: 99 MG/DL (ref 70–125)
HCT VFR BLD AUTO: 39.9 % (ref 35–47)
HGB BLD-MCNC: 13.1 G/DL (ref 12–16)
MCH RBC QN AUTO: 31.7 PG (ref 27–34)
MCHC RBC AUTO-ENTMCNC: 32.8 G/DL (ref 32–36)
MCV RBC AUTO: 97 FL (ref 80–100)
PLATELET # BLD AUTO: 228 THOU/UL (ref 140–440)
PMV BLD AUTO: 10.7 FL (ref 8.5–12.5)
POTASSIUM BLD-SCNC: 4.6 MMOL/L (ref 3.5–5)
PROT SERPL-MCNC: 6.5 G/DL (ref 6–8)
RBC # BLD AUTO: 4.13 MILL/UL (ref 3.8–5.4)
SODIUM SERPL-SCNC: 141 MMOL/L (ref 136–145)
TSH SERPL DL<=0.005 MIU/L-ACNC: 1.25 UIU/ML (ref 0.3–5)
VIT B12 SERPL-MCNC: >2000 PG/ML (ref 213–816)
WBC: 6.4 THOU/UL (ref 4–11)

## 2021-05-12 ENCOUNTER — RECORDS - HEALTHEAST (OUTPATIENT)
Dept: LAB | Facility: CLINIC | Age: 81
End: 2021-05-12

## 2021-05-13 LAB — 25(OH)D3 SERPL-MCNC: 7.5 NG/ML (ref 30–80)

## 2021-05-25 ENCOUNTER — RECORDS - HEALTHEAST (OUTPATIENT)
Dept: ADMINISTRATIVE | Facility: CLINIC | Age: 81
End: 2021-05-25

## 2021-05-26 ENCOUNTER — RECORDS - HEALTHEAST (OUTPATIENT)
Dept: ADMINISTRATIVE | Facility: CLINIC | Age: 81
End: 2021-05-26

## 2021-05-27 VITALS
DIASTOLIC BLOOD PRESSURE: 74 MMHG | SYSTOLIC BLOOD PRESSURE: 141 MMHG | HEART RATE: 60 BPM | RESPIRATION RATE: 18 BRPM | OXYGEN SATURATION: 96 % | TEMPERATURE: 98.1 F

## 2021-05-28 ENCOUNTER — RECORDS - HEALTHEAST (OUTPATIENT)
Dept: ADMINISTRATIVE | Facility: CLINIC | Age: 81
End: 2021-05-28

## 2021-05-30 ENCOUNTER — RECORDS - HEALTHEAST (OUTPATIENT)
Dept: ADMINISTRATIVE | Facility: CLINIC | Age: 81
End: 2021-05-30

## 2021-05-30 VITALS — WEIGHT: 193 LBS | BODY MASS INDEX: 29.25 KG/M2 | HEIGHT: 68 IN

## 2021-06-03 ENCOUNTER — OFFICE VISIT - HEALTHEAST (OUTPATIENT)
Dept: CARDIOLOGY | Facility: CLINIC | Age: 81
End: 2021-06-03

## 2021-06-03 ENCOUNTER — RECORDS - HEALTHEAST (OUTPATIENT)
Dept: CARDIOLOGY | Facility: CLINIC | Age: 81
End: 2021-06-03

## 2021-06-03 ENCOUNTER — RECORDS - HEALTHEAST (OUTPATIENT)
Dept: ADMINISTRATIVE | Facility: OTHER | Age: 81
End: 2021-06-03

## 2021-06-03 DIAGNOSIS — R42 DIZZINESS: ICD-10-CM

## 2021-06-03 RX ORDER — OMEGA-3S/DHA/EPA/FISH OIL/D3 300MG-1000
400 CAPSULE ORAL DAILY
Status: ON HOLD | COMMUNITY
Start: 2021-06-03 | End: 2021-07-10

## 2021-06-04 VITALS
BODY MASS INDEX: 26.15 KG/M2 | DIASTOLIC BLOOD PRESSURE: 86 MMHG | HEART RATE: 86 BPM | SYSTOLIC BLOOD PRESSURE: 124 MMHG | WEIGHT: 172 LBS

## 2021-06-04 VITALS
BODY MASS INDEX: 26.15 KG/M2 | SYSTOLIC BLOOD PRESSURE: 122 MMHG | DIASTOLIC BLOOD PRESSURE: 75 MMHG | HEART RATE: 69 BPM | WEIGHT: 172 LBS

## 2021-06-04 VITALS — BODY MASS INDEX: 26.07 KG/M2 | WEIGHT: 172 LBS | HEIGHT: 68 IN

## 2021-06-08 NOTE — PROGRESS NOTES
[facility]      Code Status:  FULL CODE  Visit Type: H & P     Facility:  [facility] [[id number]]           History of Present Illness: Alice Lawson is a 76 y.o. female Who is currently admitted to the [place] [facility]  as per the history this is a patient who lives in her own home with underlying history of hypertension hyperlipidemia who has been dealing with severe bone on bone arthritis of her left knee.  she was electively admitted to the hospital for a left total knee replacement arthroplasty secondary to failure of conservative treatments.   postoperatively she did well and her postoperative course was uneventful .  she has been sent to the TCU for rehabilitation.  She did have some mild acute blood loss anemia and hemoglobin dropped down to 11.2 but no need for blood transfusion was felt necessary  her blood pressures were elevated postoperatively with perioperative blood pressures being high and she's currently on losartan and metoprolol  she does have chronic constipation and has not had a BM for seven days  her pain was controlled with schedule Tylenol and oxycodone and she's requesting a medication dose reduction    Past Medical History   Diagnosis Date     Arthritis      Cancer      melanomia     Hypertension      Melanoma      Past Surgical History   Procedure Laterality Date     Neck surgery       Knee arthroscopy Bilateral      Cataract extraction Bilateral      2014     Replacement total knee Left 1/5/2017     Procedure: LEFT TOTAL KNEE ARTHROPLASTY COMPUTER ASSIST;  Surgeon: Micah Gallagher MD;  Location: [facility] OR;  Service:      No family history on file.  Social History     Social History     Marital status:      Spouse name: N/A     Number of children: N/A     Years of education: N/A     Occupational History     Not on file.     Social History Main Topics     Smoking status: Never Smoker     Smokeless tobacco: Not on file      Alcohol use No     Drug use: No     Sexual activity: Not on file     Other Topics Concern     Not on file     Social History Narrative     Current Outpatient Prescriptions   Medication Sig Dispense Refill     acetaminophen (TYLENOL) 500 MG tablet Take 2 tablets (1,000 mg total) by mouth 3 (three) times a day for 10 days. 30 tablet 0     aspirin 325 MG tablet Take 1 tablet (325 mg total) by mouth 2 (two) times a day with meals. 60 tablet 0     docoshexanoic acid-eicosapent 500 mg (FISH OIL) 500-100 mg cap capsule Take 500 mg by mouth daily.       losartan (COZAAR) 25 MG tablet Take 25 mg by mouth daily.       metoprolol succinate (TOPROL-XL) 50 MG 24 hr tablet Take 50 mg by mouth 2 (two) times a day.       oxyCODONE (ROXICODONE) 5 MG immediate release tablet Take 1-2 tablets (5-10 mg total) by mouth every 4 (four) hours as needed for pain (5mg for pain level 1-5, 10 mg for pain level 6-10). 60 tablet 0     senna-docusate (PERICOLACE) 8.6-50 mg tablet Take 1 tablet by mouth 2 (two) times a day.  0     No current facility-administered medications for this visit.      Allergies   Allergen Reactions     Hydrochlorothiazide Other (See Comments)     Foggy feeling (electrolyte imbalance and ended up in hospital)         Review of Systems:    Constitutional: Negative.  Negative for fever, chills, has activity change, appetite change and fatigue.   HENT: Negative for congestion and facial swelling.    Eyes: Negative for photophobia, redness and visual disturbance.   Respiratory: Negative for cough and chest tightness.    Cardiovascular: Negative for chest pain, palpitations and leg swelling.   Gastrointestinal: Negative for nausea, diarrhea, has constipation,no  blood in stool and abdominal distention.   Genitourinary: Negative.    Musculoskeletal: Negative. Pain LLE   Skin: Negative.    Neurological: Negative for dizziness, tremors, syncope, weakness, light-headedness and headaches.   Hematological: Does not bruise/bleed  easily.   Psychiatric/Behavioral: Negative.      Vitals:    01/09/17 1444   BP: 104/64   Pulse: 71   Resp: 16   Temp: 98  F (36.7  C)       Physical Exam:    GENERAL: no acute distress. Cooperative in conversation.   HEENT: pupils are equal, round and reactive. Oral mucosa is moist and intact.  RESP:Chest symmetric. Regular respiratory rate. No stridor.  CVS: S1S2  ABD: Nondistended, soft.  EXTREMITIES: No lower extremity edema. Left knee incision is intact with Steri-Strips present  noted erythema or drainage  NEURO: non focal. Alert and oriented x3.   PSYCH: within normal limits. No depression or anxiety.  SKIN: warm dry intact     Labs:    Recent Results (from the past 240 hour(s))   HM2(CBC w/o Differential)   Result Value Ref Range    WBC 6.6 4.0 - 11.0 thou/uL    RBC 4.39 3.80 - 5.40 mill/uL    Hemoglobin 13.5 12.0 - 16.0 g/dL    Hematocrit 40.5 35.0 - 47.0 %    MCV 92 80 - 100 fL    MCH 30.9 27.0 - 34.0 pg    MCHC 33.4 32.0 - 36.0 g/dL    RDW 12.5 11.0 - 14.5 %    Platelets 263 140 - 440 thou/uL    MPV 7.8 7.0 - 10.0 fL   Creatinine   Result Value Ref Range    Creatinine 0.89 0.60 - 1.10 mg/dL    GFR MDRD Af Amer >60 >60 mL/min/1.73m2    GFR MDRD Non Af Amer >60 >60 mL/min/1.73m2   INR (Baseline for all patients undergoing hip or knee procedures)   Result Value Ref Range    INR 1.01 0.90 - 1.10   Hemoglobin - Daily x 2   Result Value Ref Range    Hemoglobin 11.5 (L) 12.0 - 16.0 g/dL   Potassium - Tomorrow AM   Result Value Ref Range    Potassium 4.6 3.5 - 5.0 mmol/L   Creatinine   Result Value Ref Range    Creatinine 0.92 0.60 - 1.10 mg/dL    GFR MDRD Af Amer >60 >60 mL/min/1.73m2    GFR MDRD Non Af Amer 59 (L) >60 mL/min/1.73m2   Hemoglobin - Daily x 2   Result Value Ref Range    Hemoglobin 11.2 (L) 12.0 - 16.0 g/dL     Xr Knee Left 1 Or 2 Vws Portable    Result Date: 1/5/2017  XR KNEE LEFT 1 OR 2 VWS PORTABLE 1/5/2017 1:09 PM INDICATION: S/P left TKA COMPARISON: None. FINDINGS: Postoperative changes of  arthroplasty. Components are well seated and normally aligned. No evidence of a complication.      Assessment/Plan:    Left TKA- done secondary to severe debilitating arthritis which failed conservative treatment  continue with her postoperative care's and incisional cares ;she's doing well    Pain management- patient was discharged on scheduled Tylenol for 10 days along with oxycodone 5 to 10 mg  at her request i am reducing the dosage of her oxycodone to 2.5 to 5 mg; she essentially wants to take only 2.5 mg as needed every four hours  advised aggressive icing.    DVT prophylaxis- on aspirin 325 twice a day for a month  early ambulation requested ,she's weight-bearing as tolerated.    Constipation- add scheduled MiraLAX for the next couple of days and monitor closely she's not had a BM for seven days  she's also on Senna; advised prune juice daily.    Hypertension her blood pressures were evaluated in the hospital and currently she is a discharged on oral losartan and metoprolol  monitor blood pressures in the TCU; stable to low so far    Hyperlipidemia -she takes fish oil supplements but is not on a statin    Acute blood loss anemia- will recheck another hemoglobin in about a weeks time  no need for blood transfusion felt necessary in the hospital and patient appears to be stable.  R/c CBC in 1 week.    Debilitation she lives alone with 14 steps in the house and is here for PT OT and rehab.  Monitor progress and routine labs.  Total time spent was 45 minutes, more than half of it was in face-to-face counseling regarding disease state, treatment, side effects, documentation, review of clinical data and coordination of care    Electronically signed by: DARLYN Liao  This progress note was completed using Dragon software and there may be grammatical errors.

## 2021-06-08 NOTE — TELEPHONE ENCOUNTER
Patient calling to report that BP continues to be elevated. Per Dr. Roldan's recommendation, patient was advised to increase her Losartan back up to 50 mg daily. She verbalized understanding and agreement. Patient will continue to monitor her BP, keep follow-up appointment with Ms. Frazier on 6/16, and call with any further questions or concerns. -roseannab

## 2021-06-08 NOTE — ANESTHESIA CARE TRANSFER NOTE
Last vitals:   Vitals:    01/05/17 0945   BP: 140/62   Pulse: 71   Resp: 16   SpO2: 98%     Patient's level of consciousness is awake  Spontaneous respirations: yes  Maintains airway independently: yes  Dentition unchanged: yes  Oropharynx: oropharynx clear of all foreign objects    QCDR Measures:  ASA# 20 - Surgical Safety Checklist: ASA20A - Safety Checks Done  PQRS# 430 - Adult PONV Prevention: 4558F - Pt received => 2 anti-emetic agents (different classes) preop & intraop  ASA# 8 - Peds PONV Prevention: NA - Not pediatric patient, not GA or 2 or more risk factors NOT present  PQRS# 424 - Mandy-op Temp Management: 4559F - At least one body temp DOCUMENTED => 35.5C or 95.9F within required timeframe  PQRS# 426 - PACU Transfer Protocol: - Transfer of care checklist used  ASA# 14 - Acute Post-op Pain: ASA14B - Patient did NOT experience pain >= 7 out of 10    I completed my SBAR handoff to the receiving nurse per policy and procedure.

## 2021-06-08 NOTE — TELEPHONE ENCOUNTER
Spoke with pt and her BP on Friday was 178/88 and she increased her Losartan to 50 mg daily. Up from 25 mg. Today 6/15 her BP is 162/80 pulse is 75. Will notify Dr. Roldan.    related to inadequate protein energy intake with advanced ALS

## 2021-06-08 NOTE — PROGRESS NOTES
[facility]      Code Status:  FULL CODE  Visit Type: Review Of Multiple Medical Conditions     Facility:  [facility] [[id number]]           History of Present Illness: Alice Lawson is a 76 y.o. female Who is currently admitted to the [place] [facility]  as per the history this is a patient who lives in her own home with underlying history of hypertension hyperlipidemia who has been dealing with severe bone on bone arthritis of her left knee.  she was electively admitted to the hospital for a left total knee replacement arthroplasty secondary to failure of conservative treatments.   postoperatively she did well and her postoperative course was uneventful .  she has been sent to the TCU for rehabilitation.  She did have some mild acute blood loss anemia and hemoglobin dropped down to 11.2 but no need for blood transfusion was felt necessary  her blood pressures were elevated postoperatively with perioperative blood pressures being high and she's currently on losartan and metoprolol and BPs are stable  she does have chronic constipation and has not had a BM for seven days  her pain was controlled with schedule Tylenol and oxycodone and she's requesting a medication dose reduction  Which was done with a good response  Not using CPM  Remains constipated with no BM for more than 8d per pt-no response to meds as yet    Past Medical History   Diagnosis Date     Arthritis      Cancer      melanomia     Hypertension      Melanoma      Past Surgical History   Procedure Laterality Date     Neck surgery       Knee arthroscopy Bilateral      Cataract extraction Bilateral      2014     Replacement total knee Left 1/5/2017     Procedure: LEFT TOTAL KNEE ARTHROPLASTY COMPUTER ASSIST;  Surgeon: Micah Gallagher MD;  Location: [facility];  Service:      No family history on file.  Social History     Social History     Marital status:      Spouse name: N/A     Number of  children: N/A     Years of education: N/A     Occupational History     Not on file.     Social History Main Topics     Smoking status: Never Smoker     Smokeless tobacco: Not on file     Alcohol use No     Drug use: No     Sexual activity: Not on file     Other Topics Concern     Not on file     Social History Narrative     Current Outpatient Prescriptions   Medication Sig Dispense Refill     acetaminophen (TYLENOL) 500 MG tablet Take 2 tablets (1,000 mg total) by mouth 3 (three) times a day for 10 days. 30 tablet 0     aspirin 325 MG tablet Take 1 tablet (325 mg total) by mouth 2 (two) times a day with meals. 60 tablet 0     docoshexanoic acid-eicosapent 500 mg (FISH OIL) 500-100 mg cap capsule Take 500 mg by mouth daily.       losartan (COZAAR) 25 MG tablet Take 25 mg by mouth daily.       metoprolol succinate (TOPROL-XL) 50 MG 24 hr tablet Take 50 mg by mouth 2 (two) times a day.       oxyCODONE (ROXICODONE) 5 MG immediate release tablet Take 1-2 tablets (5-10 mg total) by mouth every 4 (four) hours as needed for pain (5mg for pain level 1-5, 10 mg for pain level 6-10). 60 tablet 0     senna-docusate (PERICOLACE) 8.6-50 mg tablet Take 1 tablet by mouth 2 (two) times a day.  0     No current facility-administered medications for this visit.      Allergies   Allergen Reactions     Hydrochlorothiazide Other (See Comments)     Foggy feeling (electrolyte imbalance and ended up in hospital)         Review of Systems:    Constitutional: Negative.  Negative for fever, chills, has activity change, appetite change and fatigue.   HENT: Negative for congestion and facial swelling.    Eyes: Negative for photophobia, redness and visual disturbance.   Respiratory: Negative for cough and chest tightness.    Cardiovascular: Negative for chest pain, palpitations and leg swelling.   Gastrointestinal: Negative for nausea, diarrhea, has constipation,no  blood in stool and abdominal distention.   Genitourinary: Negative.     Musculoskeletal: Negative. Pain LLE   Skin: Negative.    Neurological: Negative for dizziness, tremors, syncope, weakness, light-headedness and headaches.   Hematological: Does not bruise/bleed easily.   Psychiatric/Behavioral: Negative.      Vitals:    01/11/17 1738   BP: 120/79   Pulse: 72   Resp: 17   Temp: 97  F (36.1  C)       Physical Exam:    GENERAL: no acute distress. Cooperative in conversation.   HEENT: pupils are equal, round and reactive. Oral mucosa is moist and intact.  RESP:Chest symmetric. Regular respiratory rate. No stridor.  CVS: S1S2  ABD: Nondistended, soft.  EXTREMITIES: No lower extremity edema. Left knee incision is intact with Steri-Strips present  noted erythema or drainage  NEURO: non focal. Alert and oriented x3.   PSYCH: within normal limits. No depression or anxiety.  SKIN: warm dry intact     Labs:    Xr Knee Left 1 Or 2 Vws Portable  Result Date: 1/5/2017  XR KNEE LEFT 1 OR 2 VWS PORTABLE 1/5/2017 1:09 PM INDICATION: S/P left TKA COMPARISON: None. FINDINGS: Postoperative changes of arthroplasty. Components are well seated and normally aligned. No evidence of a complication.    Assessment/Plan:    Left TKA- done secondary to severe debilitating arthritis which failed conservative treatment  continue with her postoperative care's and incisional cares ;she's doing well    Pain management- patient was discharged on scheduled Tylenol for 10 days along with oxycodone 5 to 10 mg  at her request i am reducing the dosage of her oxycodone to 2.5 to 5 mg; she essentially wants to take only 2.5 mg as needed every four hours  advised aggressive icing.doing well    DVT prophylaxis- on aspirin 325 twice a day for a month  early ambulation requested ,she's weight-bearing as tolerated.    Constipation- on scheduled MiraLAX for the next couple of days along with senna and monitor closely she's not had a BM for >seven days  she's also on Senna; advised prune juice daily.; has had no response so added  metamucil;sorbitol and enema as needed; she will get an XRay done is no BM in 24hrs    Hypertension her blood pressures were evaluated in the hospital and currently she is a discharged on oral losartan and metoprolol  monitor blood pressures in the TCU; stable to low so far    Hyperlipidemia -she takes fish oil supplements but is not on a statin    Acute blood loss anemia- will recheck another hemoglobin in about a weeks time  no need for blood transfusion felt necessary in the hospital and patient appears to be stable.  R/c CBC in 1 week.    Debilitation she lives alone with 14 steps in the house and is here for PT OT and rehab.  Monitor progress and routine labs. Doing well and wants to go home this weekend  Not using CPM  Total time spent was 35 minutes, more than half of it was in face-to-face counseling regarding disease state, treatment, side effects, documentation, review of clinical data and coordination of care    Electronically signed by: DARLYN Liao  This progress note was completed using Dragon software and there may be grammatical errors.

## 2021-06-08 NOTE — PATIENT INSTRUCTIONS - HE
It was nice to visit with you by telephone today.  We talked about some episodic dizzy spells and that you are feeling better since you have done a better job keeping your self hydrated.  I think keeping your self hydrated is very important.  I also recommended that you decrease the losartan from 50 mg daily to 25 mg daily by cutting the pill in half.  Please monitor your blood pressure and call with 6-8 blood pressure readings over the next few weeks.  Please call sooner with any symptoms of dizziness or lightheadedness or if your blood pressures persistently over 140/90.  My nurse is Angelique and her number is [phone].

## 2021-06-08 NOTE — TELEPHONE ENCOUNTER
Sounds good.  I certainly think it would be fine for her to go up to the losartan if her blood pressure is running above 140 consistently.  Thank you carina

## 2021-06-08 NOTE — ANESTHESIA PROCEDURE NOTES
Peripheral Block    Start time: 1/5/2017 9:05 AM  End time: 1/5/2017 9:07 AM  post-op analgesia per surgeon order as noted in medical record  Staffing:  Performing  Anesthesiologist: OLGA PURI  Preanesthetic Checklist  Completed: patient identified, site marked, risks, benefits, and alternatives discussed, timeout performed, consent obtained, at patient's request, airway assessed, oxygen available, suction available, emergency drugs available and hand hygiene performed  Peripheral Block  Block type: sciatic, popliteal (L post tib n)  Prep: ChloraPrep  Patient position: supine (leg elevated)  Patient monitoring: cardiac monitor, continuous pulse oximetry, heart rate and blood pressure  Laterality: left  Injection technique: ultrasound guided    Ultrasound used to visualize needle placement in proximity to nerve being blocked: yes   Permanent ultrasound image captured for medical record    Needle  Needle type: Stimuplex   Needle gauge: 21 G  Needle length: 4 in    Assessment  Injection assessment: no difficulty with injection, negative aspiration for heme, no paresthesia on injection and incremental injection

## 2021-06-08 NOTE — ANESTHESIA PREPROCEDURE EVALUATION
Anesthesia Evaluation      Patient summary reviewed   No history of anesthetic complications     Airway   Mallampati: I  Neck ROM: full   Pulmonary - negative ROS and normal exam                          Cardiovascular - normal exam  (+) hypertension, ,      Neuro/Psych - negative ROS     Endo/Other - negative ROS      GI/Hepatic/Renal - negative ROS           Dental - normal exam                        Anesthesia Plan  Planned anesthetic: spinal and peripheral nerve block  Sedation propofol  GA prn  ASA 2     Anesthetic plan and risks discussed with: patient    Post-op plan: routine recovery

## 2021-06-08 NOTE — TELEPHONE ENCOUNTER
bp running 152/87. Pt will go for echo and take bp when home again and then take in am and call back with result. She is having a lot of pain to her right leg. Her Losartan was decreased on 5/26 to 25 mg daily from 50 mg daily. Will update Dr. Roldan.

## 2021-06-08 NOTE — TELEPHONE ENCOUNTER
The blood pressure she provides is mildly elevated.  She does not need to call with these blood pressure.  I would suggest that she check her blood pressure after being seated for 10 minutes and vary the time of day and write down her blood pressure over the next 7 to 10 days.  Recently the losartan was decreased from 50 mg to 25 mg because of dizziness.  She can certainly go back up to the 50 mg of losartan but monitor closely for dizziness and keep yourself well-hydrated.  Please  try to define further what she means about not feeling well.  Her echocardiogram looks good.  Normal function with mild valve regurgitation which does not require any intervention.  Please let me know what she states.mdg

## 2021-06-08 NOTE — TELEPHONE ENCOUNTER
----- Message from Lidia Hammer sent at 6/5/2020  8:56 AM CDT -----  Regarding: [facility] PT / SPECIAL INSTRUCTIONS  General phone call:    Caller: Jenna Bowman     Primary cardiologist: [facility]    Detailed reason for call: Pt states she spoke to Karyn TAYLOR Regarding her echo test on 6/9 and was told she was going to receive a letter in the mail with special instructions regarding it. Pt states she has not received the letter yet. Pt said she is also experiencing pain in her right leg, she would like a call back to discuss the letter and her symptoms.     New or active symptoms? Yes     Best phone number: [phone]    Best time to contact: Today if possible     Ok to leave a detailed message? Yes     Device? No     Additional Info: Pt has upcoming appt with Karyn MURGUIA on 6/16 and NERY on 7/7.

## 2021-06-08 NOTE — TELEPHONE ENCOUNTER
Spoke with pt and confirmed appointments. She will go forward with her neuro appointments for her leg/back pain. Will continue meds as ordered.

## 2021-06-08 NOTE — PATIENT INSTRUCTIONS - HE
Alice Lawson,    It was a pleasure to see you today at the [facility].     My recommendations after this visit include:    - No medications changes made today     -  Please seek medical attention if worsening leg pain, new onset of chest pain, shortness of breath, headache, blurry vision, double vision or any weakness in your arms and legs.     -Make sure to drink at 55 to 64 ounces of fluid per day    -Change of positions slowly to prevent worsening dizziness and fall    -Follow-up with your primary provider regarding your ongoing leg pain.    - Follow up with Dr. Roldan as scheduled in July    - Please call  [phone], if you have any questions or concerns    Keshawn Santiago CNP

## 2021-06-08 NOTE — ANESTHESIA PROCEDURE NOTES
Peripheral Block    Patient location during procedure: pre-op  Start time: 1/5/2017 9:08 AM  End time: 1/5/2017 9:10 AM  post-op analgesia per surgeon order as noted in medical record  Staffing:  Performing  Anesthesiologist: OLGA PURI  Preanesthetic Checklist  Completed: patient identified, site marked, risks, benefits, and alternatives discussed, timeout performed, consent obtained, at patient's request, airway assessed, oxygen available, suction available, emergency drugs available and hand hygiene performed  Peripheral Block  Block type: saphenous, adductor canal block  Prep: ChloraPrep  Patient position: supine  Patient monitoring: cardiac monitor, continuous pulse oximetry, heart rate and blood pressure  Laterality: left  Injection technique: ultrasound guided    Ultrasound used to visualize needle placement in proximity to nerve being blocked: yes   Permanent ultrasound image captured for medical record    Needle  Needle gauge: 20G  Needle length: 6 in  no peripheral nerve catheter placed  Assessment  Injection assessment: no difficulty with injection, negative aspiration for heme, no paresthesia on injection, incremental injection and transient paresthesias

## 2021-06-08 NOTE — PROGRESS NOTES
Review of Systems - History obtained from the patient  General ROS: positive for  - weight loss  Psychological ROS: negative  Ophthalmic ROS: negative  ENT ROS: negative  Hematological and Lymphatic ROS: negative  Respiratory ROS: negative  Cardiovascular ROS: negative  Gastrointestinal ROS: positive for - constipation  Genito-Urinary ROS: positive for - frequent urination at night  Musculoskeletal ROS: positive for - muscle pain and falls   Neurological ROS: positive for - dizziness and loss of balance   Dermatological ROS: negative

## 2021-06-08 NOTE — TELEPHONE ENCOUNTER
----- Message from Lidia Hammer sent at 6/9/2020  9:50 AM CDT -----  Regarding: [facility] PT / ECHO TODAY, 6/9 AT 12:45PM  General phone call:    Caller: Jenna Bowman     Primary cardiologist: [facility]     Detailed reason for call: Pt states she took her BP today and it was really high, she is wondering if she should reschedule her echo test scheduled at 12:45pm today. Please advise and call pt back.     New or active symptoms? Yes     Best phone number: [phone]    Best time to contact: Today    Ok to leave a detailed message? Yes     Device? No     Additional Info:

## 2021-06-08 NOTE — TELEPHONE ENCOUNTER
Spoke with pt and she is ok with results of echo. She has appointment with Karyn and Dr. Roldan coming up.

## 2021-06-08 NOTE — TELEPHONE ENCOUNTER
Spoke with pt and she did not take her bp after echo yesterday. She is not feeling the best due to not sleeping well. Will forward message to Dr. Roldan.   Caller: Jenna   Primary cardiologist: [facility]     Detailed reason for call: Jenna is calling back to report her BP reading for this morning. She reports 149/85. Please call the patient back to discuss     Best phone number: [phone]   Best time to contact: anytime

## 2021-06-08 NOTE — TELEPHONE ENCOUNTER
----- Message from Frank Roldan MD sent at 6/10/2020  3:42 PM CDT -----  As discussed on the telephone note from earlier today.  Echocardiogram looks stable.  There is mild to moderate valvular insufficiency that will require periodic follow-up.  Patient should be scheduled for an office visit as soon as able to be made with myself or alexis soto

## 2021-06-08 NOTE — TELEPHONE ENCOUNTER
She really does not need to notify us of every day's blood pressure.  These blood pressures are mildly elevated and it may take a good week or 2 to see with the increased dose of losartan manifests itself.  Please talk with her about monitoring her blood pressure after she has been seated for 10 minutes and watching the salt in her diet and allowing the increased dose of losartan to kick in.  Initially the losartan was decreased to because of symptoms of dizziness and please confirm that she is not having any additional dizziness.mdg I would appreciate blood pressure readings sometime in the morning and sometime in the afternoon and always after she has been seated for a good 10 minutes.mdg

## 2021-06-08 NOTE — ANESTHESIA POSTPROCEDURE EVALUATION
Patient: Alice Lawson  LEFT TOTAL KNEE ARTHROPLASTY COMPUTER ASSIST  Anesthesia type: spinal    Patient location: PACU  Last vitals:   Vitals:    01/05/17 1546   BP: 142/66   Pulse: 69   Resp: 16   Temp: 36.6  C (97.9  F)   SpO2: 93%     Post vital signs: stable  Level of consciousness: awake and responds to simple questions  Post-anesthesia pain: pain controlled  Post-anesthesia nausea and vomiting: no  Pulmonary: unassisted, return to baseline  Cardiovascular: stable and blood pressure at baseline  Hydration: adequate  Anesthetic events: no    QCDR Measures:  ASA# 11 - Mandy-op Cardiac Arrest: ASA11B - Patient did NOT experience unanticipated cardiac arrest  ASA# 12 - Mandy-op Mortality Rate: ASA12B - Patient did NOT die  ASA# 13 - PACU Re-Intubation Rate: NA - No ETT / LMA used for case  ASA# 10 - Composite Anes Safety: ASA10A - No serious adverse event  ASA# 38 - New Corneal Injury: ASA38A - No new exposure keratitis or corneal abrasion in PACU    Additional Notes:

## 2021-06-08 NOTE — TELEPHONE ENCOUNTER
----- Message -----  From: Kenyetta Dubon  Sent: 6/12/2020  11:19 AM CDT  To: Angelique Frey RN      Caller: patient    Primary cardiologist: Dr. Roldan    Detailed reason for call: Patient stated that she is on her third day of high bp. Today it was 170/80/65. Please advise    Best phone number: [phone]  Best time to contact: today  Ok to leave a detailed message? yes  Device? no

## 2021-06-08 NOTE — ANESTHESIA PROCEDURE NOTES
Spinal Block    Patient location during procedure: OB  Start time: 1/5/2017 10:54 AM  End time: 1/5/2017 10:58 AM  Reason for block: primary anesthetic    Staffing:  Performing  Anesthesiologist: OLGA PRUI    Preanesthetic Checklist  Completed: patient identified, risks, benefits, and alternatives discussed, timeout performed, consent obtained, airway assessed, oxygen available, suction available, emergency drugs available and hand hygiene performed  Spinal Block  Patient position: sitting  Prep: ChloraPrep  Patient monitoring: heart rate, cardiac monitor, continuous pulse ox and blood pressure  Approach: midline  Location: L2-3  Injection technique: single-shot  Needle type: pencil-tip   Needle gauge: 24 G      Additional Notes:  Lot [id number]  Subarachnoid Block for Operative Procedure    Informed consent was obtained.  Benefit, alternatives, and risks of procedure were discussed including but not limited to the risks of infection, bleeding, temporary or permanent nerve injury, headache, seizure, cardio-respiratory arrest, and death.  All questions answered.  Patient agreed to proceed.  Procedural pause was completed prior to commencement of procedure.       Fentanyl and Midazolam were provided for light-to-moderate sedation and patient comfort along with supplemental oxygen  and continuous monitoring.  The patient remained arousable throughout the procedure.    The patient was placed in the sitting position.  A mask, sterile gloves, and Spinocan Spinal Tray ([id number]) were used.  After identification of appropriate landmarks, aseptic preparation of the lumbar spine was completed using 10% Povidone-Iodine solution.  A 25 gauge, 1.5 inch needle was used to inject a total of 3 mL of 1% Lidocaine to anesthetize the skin and deeper tissues at the level of spinal injection.    A 20 gauge, 1.5 inch spinal introducer needle was placed at the L23 level in a ML approach.   A 4 inch, 24 gauge PENCAN needle was gently  placed through the introducer needle to access the intrathecal space.  No pain or paresthesias were noted by patient during needle placement.  Clear cerebrospinal fluid flowed freely out of the needle and in all four quadrants.  No blood evident.  A total of 3.0 mL of 0.5%PF Bupivacaine and 25 micrograms of Fentanyl was then injected slowly without paresthesia or pain.  The spinal and introducer needles were removed upon completion of medication injection.  The patient was returned to supine position.  The patient tolerated the procedure well.

## 2021-06-08 NOTE — TELEPHONE ENCOUNTER
Spoke with pt and she will monitor bp. She states she is feeling better now that she has been up a while and eaten. She sleeps poorly on most nights and has an over active bladder which does not help. Will watch BPs over next few days and monitor before increasing Losartan dose back to 50 mg. Will call if increased issues.

## 2021-06-09 NOTE — PATIENT INSTRUCTIONS - HE
It was nice to visit with you today.  Please call my nurse Angelique with any heart related questions.  Her number is [phone].

## 2021-06-09 NOTE — PROGRESS NOTES
Code Status:  FULL CODE  Visit Type: Problem Visit     Facility:  [facility] [[id number]]             History of Present Illness: Alice Lawson is a 80 y.o. female who I am seeing today for follow up on the TCU. Pt recently hospitalized on 6/18/2020.  Hypertension, vitamin B12 deficiency, urinary incontinence, pulmonary nodule, generalized weakness, failure to thrive and physical deconditioning.  Patient hospitalized with metabolic encephalopathy.  Patient treated for UTI secondary to her acute delirium.  She did complete a 5-day course of Cefdinir. However urine culture on 6/18/2020 was negative.  Post antibiotic treatment her delirium  did clear somewhat. However patient presented with some urinary retention.  He had elevated post void residuals.  She was started on a course of tamsulosin initially.  However however after consult with urology a Camargo catheter was placed.  Is recommended for a voiding trial in 1 to 2 weeks at the TCU. Patient with underlying spinal stenosis. There was some question of progression of her chronic disease due to his shuffling gait and new onset of urinary retention.  Patient was evaluated for cauda equina syndrome.  Patient underwent a CT of the spine on 6/22 which demonstrated several multilevel spinal stenosis and degenerative disease.  Orthopedics was consulted who ordered a follow-up with MRI.  MRI was negative.  On 6/21 patient became very agitated and combative.  Hospital associated delirium worsened and patient required one-on-one over the weekend.  Psychiatry was consulted and Seroquel was added.  Patient observed to have visual hallucinations and neurocognitive symptoms concerning for the possibility of Parkinson's or other neurocognitive disorder such as Lewy body.  However a MRI of the head and brain on 6/22 did not demonstrate any acute abnormalities.  She has a follow-up with neurology clinic on July 8.      Today patient sitting up in bedside chair.   Patient continues with Camargo catheter in place.  Patient had complained of some burning with attempting to urinate although she had catheter.  MD was notified.  UA UC ordered.  A urinalysis was obtained however the catheter was not changed at the time collected.  Urinalysis results today positive for cloudy urine, moderate blood, positive nitrites, large leukocytes, many bacteria,  WBCs, mucus few, WBC present.  Patient currently afebrile.  She does continue with some confusion and hallucinations per the nursing staff.  However she is answering  simple yes/no questions appropriately.  Have asked nursing staff to change her Camargo catheter and obtain a new specimen.  Blood pressure satisfactory.    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     Parasomnia 01/18/2020     Solitary pulmonary nodule 01/18/2020     Aortic valve insufficiency 06/17/2020     Weakness 06/18/2020     ACP (advance care planning) 06/18/2020     Vitamin B12 deficiency (non anemic) 06/18/2020     Generalized muscle weakness 06/18/2020     Acute cystitis without hematuria      Acute metabolic encephalopathy 06/19/2020     Delirium 06/21/2020     Complex care coordination 06/21/2020     Spinal stenosis of lumbar region without neurogenic claudication 06/21/2020     Cognitive and behavioral changes      Sleep difficulties      Stress reaction with psychomotor agitation      Resolved Ambulatory Problems      Diagnosis Date Noted     No Resolved Ambulatory Problems     Past Medical History:   Diagnosis Date     Arthritis      Cancer (H)      Hypertension      Melanoma (H)      Current Outpatient Medications   Medication Sig     acetaminophen (TYLENOL) 325 MG tablet Take 2 tablets (650 mg total) by mouth every 4 (four) hours as needed for pain or fever.     cyanocobalamin (VITAMIN B-12) 1000 MCG tablet Take 1,000 mcg by mouth daily.     fish oil-omega-3 fatty acids (FISH OIL) 300-1,000 mg capsule Take 1 g by mouth daily.     lidocaine 4 % patch Place 2 patches on the skin daily. Remove and discard patch with 12 hours or as directed by MD.     melatonin 3 mg Tab tablet Take 1 tablet (3 mg total) by mouth at bedtime as needed.     melatonin 3 mg Tab tablet Take 2 tablets (6 mg total) by mouth at bedtime.     metoprolol succinate (TOPROL-XL) 25 MG Take 25 mg by mouth at bedtime.      polyethylene glycol (MIRALAX) 17 gram packet Take 1 packet (17 g total) by mouth daily as needed.     QUEtiapine (SEROQUEL) 25 MG tablet Take 0.5 tablets (12.5 mg total) by mouth every 6 (six) hours as needed (Agitation that is not redirectable.).     QUEtiapine (SEROQUEL) 25 MG tablet Take 0.5 tablets (12.5 mg total) by mouth 3 (three) times a day.     senna-docusate (PERICOLACE) 8.6-50 mg tablet Take 1 tablet by mouth daily.       Allergies   Allergen Reactions     Hydrochlorothiazide Other (See Comments)     Foggy feeling (electrolyte imbalance and ended up in hospital)     Rosuvastatin      Other reaction(s): *Unknown, caused abd wall pain     Past medical history includes    Review of Systems  No fevers or chills. No headache, lightheadedness or dizziness.+ hallucinations.  No SOB, chest pains or palpitations. Appetite is good. No nausea, vomiting, constipation or diarrhea. Continues with ansari catheter. Hx of Urinary retention.  Otherwise review of systems are negative.     Physical Exam  PHYSICAL EXAMINATION:  General: Awake, Alert,  oriented x3, appropriately, follows simple commands, conversant  VS: /70, P 72,  R 16, T 98.4, O2 sat 96% on RA. W 170.4 lbs  HEENT:PERRLA, Pink conjunctiva, anicteric sclerae, moist oral mucosa. Facial symmetry. Tongue midline.   NECK: Supple, without any lymphadenopathy, or masses  CVS:  S1  S2, without murmur or gallop.   LUNG: Clear to auscultation, No wheezes, rales or rhonci.  BACK: No kyphosis of the thoracic spine  ABDOMEN: Soft, nontender to palpation, with positive bowel sounds  EXTREMITIES: Moves both upper and lower extremities with generalized weakness, trace pedal edema, no calf tenderness  SKIN: Warm and dry, no rashes or erythema noted  NEUROLOGIC: Intact, pulses palpable  PSYCHIATRIC: Mild cognitive impairment noted. Poor word recall at times.       Labs:   Lab Results   Component Value Date    WBC 8.6 06/30/2020    HGB 11.7 (L) 06/30/2020    HCT 37.2 06/30/2020    MCV 98 06/30/2020     06/30/2020     Results for orders placed or performed in visit on 06/30/20   Basic Metabolic Panel   Result Value Ref Range    Sodium 141 136 - 145 mmol/L    Potassium 3.9 3.5 - 5.0 mmol/L    Chloride 107 98 - 107 mmol/L    CO2 26 22 - 31 mmol/L    Anion Gap, Calculation 8 5 - 18 mmol/L    Glucose 91 70 - 125 mg/dL    Calcium 9.0 8.5 - 10.5 mg/dL    BUN 16 8 - 28 mg/dL    Creatinine 0.88 0.60 - 1.10 mg/dL    GFR MDRD Af Amer >60 >60 mL/min/1.73m2    GFR MDRD Non Af Amer >60 >60 mL/min/1.73m2           Assessment/Plan:  1. Metabolic encephalopathy   patient continues with occasional hallucinations.  She is on Seroquel 25 3 times daily.  Only one-time use of her as needed dosing.  Will DC this.   2. Acute cystitis with hematuria   patient continues with Camargo catheter.  Increased burning last evening.  UA UC was obtained.  We will need to repeat UA UC once catheter change.  However we will treat empirically for now.  Will give IM Rocephin 1 g of lidocaine x1.  Follow-up CBC and BMP on Thursday.  Encourage  fluids.   3. Urinary retention   continues with Camargo catheter.  We will attempt to DC once UTI is treated.   4. Shuffling gait   continues in therapy.  Questionable Parkinson's.  If no improvement will have her follow-up with neurology.   5. Spinal stenosis, unspecified spinal region   continues on lidocaine patch and Tylenol for pain.   6. Vitamin B12 deficiency   continues on supplement.         35 minutes spent of which greater than 50% was spent face to face interviewing pt, cdiscussing cognition, previous urinary status as well as collaborating with nursing staff regarding catheter and urine specimen collected.      Electronically signed by: Monik Becerra, CNP

## 2021-06-09 NOTE — PROGRESS NOTES
Code Status:  FULL CODE  Visit Type: Problem Visit     Facility:  [facility] [[id number]]             History of Present Illness: Alice Lawson is a 80 y.o. female who I am seeing today for discharge from the TCU, secondary to neurological symptoms.. Pt recently hospitalized on 6/18/2020.  Hypertension, vitamin B12 deficiency, urinary incontinence, pulmonary nodule, generalized weakness, failure to thrive and physical deconditioning.  Patient hospitalized with metabolic encephalopathy.  Patient treated for UTI secondary to her acute delirium.  She did complete a 5-day course of Cefdinir. However urine culture on 6/18/2020 was negative.  Post antibiotic treatment her delirium  did clear somewhat. However patient presented with some urinary retention. She had elevated post void residuals.  She was started on a course of tamsulosin initially.  However however after consult with urology a Camargo catheter was placed.  Is recommended for a voiding trial in 1 to 2 weeks at the TCU. Patient with underlying spinal stenosis. There was some question of progression of her chronic disease due to his shuffling gait and new onset of urinary retention.  Patient was evaluated for cauda equina syndrome.  Patient underwent a CT of the spine on 6/22 which demonstrated several multilevel spinal stenosis and degenerative disease.  Orthopedics was consulted who ordered a follow-up with MRI.  MRI was negative.  On 6/21 patient became very agitated and combative.  Hospital associated delirium worsened and patient required one-on-one over the weekend.  Psychiatry was consulted and Seroquel was added.  Patient observed to have visual hallucinations and neurocognitive symptoms concerning for the possibility of Parkinson's or other neurocognitive disorder such as Lewy body.  However a MRI of the head and brain on 6/22 did not demonstrate any acute abnormalities.  She has a follow-up with neurology clinic on July 8.      Today  patient sitting up in bedside chair.  Patient recently seen by neurology and diagnosed with Parkinson's.  She was placed on Sinemet with then gradual increase.  She is currently on Sinemet 25/100 two times daily.  She is to increase to 3 times daily on 7/25/2020.  She initially had some dizziness however this appears to be improving.  She has been told to move slowly when changing positions.  She was also previously on Flomax starting that initially at the hospital however recently discontinued this secondary to some orthostatic hypotension.  Metoprolol was also decreased to 12.5 mg at bedtime.  Blood pressures have improved.  No further orthostasis.  Patient denies any urinary retention.  Patient does have some underlying cognitive impairments.  She initially admitted on Seroquel however this was discontinued.  No further hallucinations.  Some improvement in cognition noted.  Gait has improved with the use of medication.  Less shuffled.  Mild cogwheel rigidity.  Occasional tremor.  Stooped posturing improved however she does continue with some spatial awareness difficulty with ambulation.        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     Parasomnia 01/18/2020     Solitary pulmonary nodule 01/18/2020     Aortic valve insufficiency 06/17/2020     Weakness 06/18/2020     Vitamin B12 deficiency (non anemic) 06/18/2020     Generalized muscle weakness 06/18/2020     Acute cystitis  without hematuria      Acute metabolic encephalopathy 06/19/2020     Delirium 06/21/2020     Complex care coordination 06/21/2020     Spinal stenosis of lumbar region without neurogenic claudication 06/21/2020     Cognitive and behavioral changes      Sleep difficulties      Stress reaction with psychomotor agitation      Resolved Ambulatory Problems     Diagnosis Date Noted     ACP (advance care planning) 06/18/2020     Past Medical History:   Diagnosis Date     Arthritis      Cancer (H)      Hypertension      Melanoma (H)      Current Outpatient Medications   Medication Sig     acetaminophen (TYLENOL) 325 MG tablet Take 2 tablets (650 mg total) by mouth every 4 (four) hours as needed for pain or fever.     carbidopa-levodopa (SINEMET)  mg per tablet Take 1 tablet by mouth 2 (two) times a day.      cyanocobalamin (VITAMIN B-12) 1000 MCG tablet Take 1,000 mcg by mouth daily.     fish oil-omega-3 fatty acids (FISH OIL) 300-1,000 mg capsule Take 1 g by mouth daily.     lidocaine 4 % patch Place 2 patches on the skin daily. Remove and discard patch with 12 hours or as directed by MD.     melatonin 3 mg Tab tablet Take 1 tablet (3 mg total) by mouth at bedtime as needed.     metoprolol succinate (TOPROL-XL) 25 MG Take 12.5 mg by mouth at bedtime.      polyethylene glycol (MIRALAX) 17 gram packet Take 1 packet (17 g total) by mouth daily as needed.     senna-docusate (PERICOLACE) 8.6-50 mg tablet Take 1 tablet by mouth daily.       Allergies   Allergen Reactions     Hydrochlorothiazide Other (See Comments)     Foggy feeling (electrolyte imbalance and ended up in hospital)     Rosuvastatin      Other reaction(s): *Unknown, caused abd wall pain         Review of Systems  No fevers or chills. No headache, lightheadedness. Occasional dizziness. No SOB, chest pains or palpitations. Appetite is good. No nausea, vomiting, constipation or diarrhea.  Denies any dysuria frequency burning or pain on today's exam. Hx of  Urinary retention. Denies any pain in her legs. Otherwise review of systems are negative.     Physical Exam  PHYSICAL EXAMINATION:  General: Awake, Alert, oriented x3, appropriately, follows simple commands, conversant  VS: /70, pulse 81, respirations 16, temperature 97.9, O2 sat 100% on RA. W 173.4 lbs  HEENT:PERRLA, Pink conjunctiva, anicteric sclerae, moist oral mucosa. Facial symmetry. Tongue midline.   NECK: Supple, without any lymphadenopathy, or masses  CVS:  S1  S2, without murmur or gallop.   LUNG: Clear to auscultation, No wheezes, rales or rhonci.  BACK: No kyphosis of the thoracic spine  ABDOMEN: Soft, nontender to palpation, with positive bowel sounds  EXTREMITIES: Moves both upper and lower extremities with generalized weakness, trace pedal edema, no calf tenderness.  Patient did ambulate to doorway and back.  Less shuffling gait noted on today's visit.  She does continue with some spatial awareness with doorways.  Issues  SKIN: Warm and dry, no rashes or erythema noted  NEUROLOGIC:  pulses palpable. Equal . Mild cogwheel rigidity of the upper extremities.  No tremor noted today.  PSYCHIATRIC: Mild cognitive impairment noted.      Labs:   Lab Results   Component Value Date    WBC 5.9 07/16/2020    HGB 12.7 07/16/2020    HCT 38.9 07/16/2020    MCV 96 07/16/2020     07/16/2020     Results for orders placed or performed in visit on 07/16/20   Basic Metabolic Panel   Result Value Ref Range    Sodium 139 136 - 145 mmol/L    Potassium 4.4 3.5 - 5.0 mmol/L    Chloride 105 98 - 107 mmol/L    CO2 26 22 - 31 mmol/L    Anion Gap, Calculation 8 5 - 18 mmol/L    Glucose 100 70 - 125 mg/dL    Calcium 9.1 8.5 - 10.5 mg/dL    BUN 9 8 - 28 mg/dL    Creatinine 0.85 0.60 - 1.10 mg/dL    GFR MDRD Af Amer >60 >60 mL/min/1.73m2    GFR MDRD Non Af Amer >60 >60 mL/min/1.73m2           Assessment/Plan:  1. Metabolic encephalopathy  Pt continues with intermittent confusion. She has been weaned off Seroquel.    Sundowning improving.     2.  Orthostatic hypotension BP improved with pushing of fluids. Suspect some dehydration.   Flomax discontinued.   Metoprolol decreased to 12.5 mg.     3. Acute cystitis with hematuria  Pt has completed her Cipro.  Camargo catheter removed.  Hx of urinary retention previously on Flomax.This was discontinued due toorthostatic hypotension. Monitor for increased symptoms or retention.    4.   Parkinson's Recently started on Sinemet, titrated up to three times a day on 7/25/20.   Continue to monitor for dizziness. Remind to change positioning slowly.     Follow up with neurology in 2 weeks.    5. Spinal stenosis, unspecified spinal region   continues on lidocaine patch and Tylenol for pain.   6. Vitamin B12 deficiency   continues on supplement.     Okay to DC home with current meds and treatment.  Home PT, OT, home health aide and RN for medication management.  We are recommending 24-hour care.  Family plans to explore assisted living.  Follow-up with primary care provider in 1 week.  Follow-up with neurology in 2 weeks.    DISCHARGE PLAN/FACE TO FACE:  I certify that this patient is under my care and that I, or a nurse practitioner or physician's assistant working with me, had a face-to-face encounter that meets the physician face-to-face encounter requirements with this patient.        I certify that, based on my findings, the following services are medically necessary home health services.    My clinical findings support the need for the above skilled services.    This patient is homebound because: recent acute delirium, newly diagnosed parkinson and cognitive impairment.     The patient is, or has been, under my care and I have initiated the establishment of the plan of care. This patient will be followed by a physician who will periodically review the plan of care.    .  Total time spent for this visit was 35 minutes with greater than 50% of time spent face-to-face with patient reviewing  medications, reviewing home care and follow-ups    Electronically signed by: Monik Becerra CNP

## 2021-06-09 NOTE — PROGRESS NOTES
Code Status:  FULL CODE  Visit Type: Problem Visit     Facility:  [facility] [[id number]]             History of Present Illness: Alice Lawson is a 80 y.o. female who I am seeing today for follow up on the TCU, secondary to neurological symptoms.. Pt recently hospitalized on 6/18/2020.  Hypertension, vitamin B12 deficiency, urinary incontinence, pulmonary nodule, generalized weakness, failure to thrive and physical deconditioning.  Patient hospitalized with metabolic encephalopathy.  Patient treated for UTI secondary to her acute delirium.  She did complete a 5-day course of Cefdinir. However urine culture on 6/18/2020 was negative.  Post antibiotic treatment her delirium  did clear somewhat. However patient presented with some urinary retention.  He had elevated post void residuals.  She was started on a course of tamsulosin initially.  However however after consult with urology a Camargo catheter was placed.  Is recommended for a voiding trial in 1 to 2 weeks at the TCU. Patient with underlying spinal stenosis. There was some question of progression of her chronic disease due to his shuffling gait and new onset of urinary retention.  Patient was evaluated for cauda equina syndrome.  Patient underwent a CT of the spine on 6/22 which demonstrated several multilevel spinal stenosis and degenerative disease.  Orthopedics was consulted who ordered a follow-up with MRI.  MRI was negative.  On 6/21 patient became very agitated and combative.  Hospital associated delirium worsened and patient required one-on-one over the weekend.  Psychiatry was consulted and Seroquel was added.  Patient observed to have visual hallucinations and neurocognitive symptoms concerning for the possibility of Parkinson's or other neurocognitive disorder such as Lewy body.  However a MRI of the head and brain on 6/22 did not demonstrate any acute abnormalities.  She has a follow-up with neurology clinic on July 8.      Today  patient sitting up in bed.  Patient has been weaned off her Seroquel.  Cognition continues improving.  She denies any hallucinations.  No further vivid dreams.  Some intermittent confusion per staff however greatly improved.  Recent removal of Camargo catheter.  Patient and voiding.  She did have some urinary retention last evening.  A bladder scan was obtained which had 511 cc.  She did void with cues.  Post void residual was 138.  I do note some other occasional post void residuals greater than 100.  She was given Flomax during hospitalization for a brief course.  She has completed her oral antibiotic.  She denies any burning or pain with urination.  She is drinking well.  Patient with Parkinson-like syndrome including shuffled gait, freezing, stooped posture mild cogwheel rigidity with occasional tremors.  She has a follow-up with neurology next week.  I did speak with neurology however at this time they would like to hold off on treatment.    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     Parasomnia 01/18/2020     Solitary pulmonary nodule 01/18/2020     Aortic valve insufficiency 06/17/2020     Weakness 06/18/2020     ACP (advance care planning) 06/18/2020     Vitamin B12 deficiency (non anemic) 06/18/2020     Generalized muscle weakness 06/18/2020     Acute cystitis without hematuria      Acute metabolic encephalopathy 06/19/2020     Delirium 06/21/2020     Complex care coordination  06/21/2020     Spinal stenosis of lumbar region without neurogenic claudication 06/21/2020     Cognitive and behavioral changes      Sleep difficulties      Stress reaction with psychomotor agitation      Resolved Ambulatory Problems     Diagnosis Date Noted     No Resolved Ambulatory Problems     Past Medical History:   Diagnosis Date     Arthritis      Cancer (H)      Hypertension      Melanoma (H)      Current Outpatient Medications   Medication Sig     tamsulosin (FLOMAX) 0.4 mg cap Take 0.4 mg by mouth.     acetaminophen (TYLENOL) 325 MG tablet Take 2 tablets (650 mg total) by mouth every 4 (four) hours as needed for pain or fever.     cyanocobalamin (VITAMIN B-12) 1000 MCG tablet Take 1,000 mcg by mouth daily.     fish oil-omega-3 fatty acids (FISH OIL) 300-1,000 mg capsule Take 1 g by mouth daily.     lidocaine 4 % patch Place 2 patches on the skin daily. Remove and discard patch with 12 hours or as directed by MD.     melatonin 3 mg Tab tablet Take 1 tablet (3 mg total) by mouth at bedtime as needed.     metoprolol succinate (TOPROL-XL) 25 MG Take 25 mg by mouth at bedtime.      polyethylene glycol (MIRALAX) 17 gram packet Take 1 packet (17 g total) by mouth daily as needed.     senna-docusate (PERICOLACE) 8.6-50 mg tablet Take 1 tablet by mouth daily.       Allergies   Allergen Reactions     Hydrochlorothiazide Other (See Comments)     Foggy feeling (electrolyte imbalance and ended up in hospital)     Rosuvastatin      Other reaction(s): *Unknown, caused abd wall pain         Review of Systems  No fevers or chills. No headache, lightheadedness or dizziness.+ hallucinations.  No SOB, chest pains or palpitations. Appetite is good. No nausea, vomiting, constipation or diarrhea. Voiding without catheter. Hx of Urinary retention. Pt denies any hallucinations.   Denies any pain in her legs. Otherwise review of systems are negative.     Physical Exam  PHYSICAL EXAMINATION:  General: Awake, Alert, oriented x3,  appropriately, follows simple commands, conversant  VS: /79, pulse 62, respirations 16, temperature 97.9, O2 sat 95% on RA. W 170.4 lbs  HEENT:PERRLA, Pink conjunctiva, anicteric sclerae, moist oral mucosa. Facial symmetry. Tongue midline.   NECK: Supple, without any lymphadenopathy, or masses  CVS:  S1  S2, without murmur or gallop.   LUNG: Clear to auscultation, No wheezes, rales or rhonci.  BACK: No kyphosis of the thoracic spine  ABDOMEN: Soft, nontender to palpation, with positive bowel sounds  EXTREMITIES: Moves both upper and lower extremities with generalized weakness, trace pedal edema, no calf tenderness  SKIN: Warm and dry, no rashes or erythema noted  NEUROLOGIC:  pulses palpable. Equal . Mild cogwheel rigidity of the upper extremities. Stooped posturing with shuffling gait. Spatial awareness difficulty with turning at thresholds. Freeze like movements at thresholds or turns.   PSYCHIATRIC: Mild cognitive impairment noted.       Labs:   Lab Results   Component Value Date    WBC 7.3 07/02/2020    HGB 11.7 (L) 07/02/2020    HCT 37.2 07/02/2020    MCV 98 07/02/2020     07/02/2020     Results for orders placed or performed in visit on 07/02/20   Basic Metabolic Panel   Result Value Ref Range    Sodium 140 136 - 145 mmol/L    Potassium 4.2 3.5 - 5.0 mmol/L    Chloride 106 98 - 107 mmol/L    CO2 27 22 - 31 mmol/L    Anion Gap, Calculation 7 5 - 18 mmol/L    Glucose 88 70 - 125 mg/dL    Calcium 9.4 8.5 - 10.5 mg/dL    BUN 18 8 - 28 mg/dL    Creatinine 0.84 0.60 - 1.10 mg/dL    GFR MDRD Af Amer >60 >60 mL/min/1.73m2    GFR MDRD Non Af Amer >60 >60 mL/min/1.73m2           Assessment/Plan:  1. Metabolic encephalopathy  Hallucinations resolved.  Sundowning improving.    Patient weaned off Seroquel.   2. Acute cystitis with hematuria   patient has completed her Cipro.  Camargo catheter removed.  Patient is having some urinary retention.  Post void residual in the 100s.  I will add that Flomax.  She  was on this briefly during hospitalization.   3. Shuffling gait    Questionable Parkinson's. Also correlating symptoms of mild cogwheel rigidity and upper arm tremors.   Consulted with Dr. Shepherd over phone today  Pt has follow up with Neurology on 7/14.    5. Spinal stenosis, unspecified spinal region   continues on lidocaine patch and Tylenol for pain.   6. Vitamin B12 deficiency   continues on supplement.       Electronically signed by: Monik Becerra, CNP

## 2021-06-09 NOTE — PROGRESS NOTES
Code Status:  FULL CODE  Visit Type: Problem Visit     Facility:  [facility] [[id number]]             History of Present Illness: Alice Lawson is a 80 y.o. female who I am seeing today for follow up on the TCU. Pt recently hospitalized on 6/18/2020.  Hypertension, vitamin B12 deficiency, urinary incontinence, pulmonary nodule, generalized weakness, failure to thrive and physical deconditioning.  Patient hospitalized with metabolic encephalopathy.  Patient treated for UTI secondary to her acute delirium.  She did complete a 5-day course of Cefdinir. However urine culture on 6/18/2020 was negative.  Post antibiotic treatment her delirium  did clear somewhat. However patient presented with some urinary retention.  He had elevated post void residuals.  She was started on a course of tamsulosin initially.  However however after consult with urology a Camargo catheter was placed.  Is recommended for a voiding trial in 1 to 2 weeks at the TCU. Patient with underlying spinal stenosis. There was some question of progression of her chronic disease due to his shuffling gait and new onset of urinary retention.  Patient was evaluated for cauda equina syndrome.  Patient underwent a CT of the spine on 6/22 which demonstrated several multilevel spinal stenosis and degenerative disease.  Orthopedics was consulted who ordered a follow-up with MRI.  MRI was negative.  On 6/21 patient became very agitated and combative.  Hospital associated delirium worsened and patient required one-on-one over the weekend.  Psychiatry was consulted and Seroquel was added.  Patient observed to have visual hallucinations and neurocognitive symptoms concerning for the possibility of Parkinson's or other neurocognitive disorder such as Lewy body.  However a MRI of the head and brain on 6/22 did not demonstrate any acute abnormalities.  She has a follow-up with neurology clinic on July 8.      Today patient sitting up in bedside chair.  Pt cognition improving. She was started on Seroquel in the hospital due to hallucinations and delirium. No further hallucinations. I will attempt to wean off. No cognitive impairment at baseline per her daughter. Pt continues with ansari catheter. She recently grew pseudomonas in her urine. She was treated for UTI during hospitalization however culture showed no growth. She was given 1 dose of IM Rocephin and transitioned onto oral Cipro. Catheter was changed. She is having some occasional tremors to the upper extremities. Shuffling gait on exam with assisting pt to ambulate ~20 feet with walker. Some spatial awareness issues as well. She also presents with mild cogwheel rigidity of the UE. I did talk at length with her daughter. She has been followed by Dr. Shepherd at the [facility] since 2018 with tremors first began. I did explain clinical correlation with parkinson's as seen on exam. She has a follow up with neurology on 7/14. I will reach out to the Dr. Shepherd. Pt also has underlying spinal stenosis. She continues on tylenol and lidocaine patch for pain.       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     Parasomnia 01/18/2020     Solitary pulmonary nodule 01/18/2020     Aortic valve insufficiency 06/17/2020     Weakness 06/18/2020     ACP (advance care planning) 06/18/2020     Vitamin B12 deficiency (non anemic) 06/18/2020     Generalized muscle weakness 06/18/2020     Acute cystitis  without hematuria      Acute metabolic encephalopathy 06/19/2020     Delirium 06/21/2020     Complex care coordination 06/21/2020     Spinal stenosis of lumbar region without neurogenic claudication 06/21/2020     Cognitive and behavioral changes      Sleep difficulties      Stress reaction with psychomotor agitation      Resolved Ambulatory Problems     Diagnosis Date Noted     No Resolved Ambulatory Problems     Past Medical History:   Diagnosis Date     Arthritis      Cancer (H)      Hypertension      Melanoma (H)      Current Outpatient Medications   Medication Sig     ciprofloxacin HCl (CIPRO) 250 MG tablet Take 250 mg by mouth 2 (two) times a day.     acetaminophen (TYLENOL) 325 MG tablet Take 2 tablets (650 mg total) by mouth every 4 (four) hours as needed for pain or fever.     cyanocobalamin (VITAMIN B-12) 1000 MCG tablet Take 1,000 mcg by mouth daily.     fish oil-omega-3 fatty acids (FISH OIL) 300-1,000 mg capsule Take 1 g by mouth daily.     lidocaine 4 % patch Place 2 patches on the skin daily. Remove and discard patch with 12 hours or as directed by MD.     melatonin 3 mg Tab tablet Take 1 tablet (3 mg total) by mouth at bedtime as needed.     metoprolol succinate (TOPROL-XL) 25 MG Take 25 mg by mouth at bedtime.      polyethylene glycol (MIRALAX) 17 gram packet Take 1 packet (17 g total) by mouth daily as needed.     QUEtiapine (SEROQUEL) 25 MG tablet Take 0.5 tablets (12.5 mg total) by mouth 3 (three) times a day. (Patient taking differently: Take 12.5 mg by mouth 2 (two) times a day. )     senna-docusate (PERICOLACE) 8.6-50 mg tablet Take 1 tablet by mouth daily.       Allergies   Allergen Reactions     Hydrochlorothiazide Other (See Comments)     Foggy feeling (electrolyte imbalance and ended up in hospital)     Rosuvastatin      Other reaction(s): *Unknown, caused abd wall pain     Past medical history includes    Review of Systems  No fevers or chills. No headache, lightheadedness or dizziness.+  hallucinations.  No SOB, chest pains or palpitations. Appetite is good. No nausea, vomiting, constipation or diarrhea. Continues with ansari catheter. Hx of Urinary retention.  Otherwise review of systems are negative.     Physical Exam  PHYSICAL EXAMINATION:  General: Awake, Alert, oriented x3, appropriately, follows simple commands, conversant  VS: /70, P 72,  R 16, T 98.4, O2 sat 96% on RA. W 170.4 lbs  HEENT:PERRLA, Pink conjunctiva, anicteric sclerae, moist oral mucosa. Facial symmetry. Tongue midline.   NECK: Supple, without any lymphadenopathy, or masses  CVS:  S1  S2, without murmur or gallop.   LUNG: Clear to auscultation, No wheezes, rales or rhonci.  BACK: No kyphosis of the thoracic spine  ABDOMEN: Soft, nontender to palpation, with positive bowel sounds  EXTREMITIES: Moves both upper and lower extremities with generalized weakness, trace pedal edema, no calf tenderness  SKIN: Warm and dry, no rashes or erythema noted  NEUROLOGIC: Intact, pulses palpable  PSYCHIATRIC: Mild cognitive impairment noted. Poor word recall at times.       Labs:   Lab Results   Component Value Date    WBC 7.3 07/02/2020    HGB 11.7 (L) 07/02/2020    HCT 37.2 07/02/2020    MCV 98 07/02/2020     07/02/2020     Results for orders placed or performed in visit on 07/02/20   Basic Metabolic Panel   Result Value Ref Range    Sodium 140 136 - 145 mmol/L    Potassium 4.2 3.5 - 5.0 mmol/L    Chloride 106 98 - 107 mmol/L    CO2 27 22 - 31 mmol/L    Anion Gap, Calculation 7 5 - 18 mmol/L    Glucose 88 70 - 125 mg/dL    Calcium 9.4 8.5 - 10.5 mg/dL    BUN 18 8 - 28 mg/dL    Creatinine 0.84 0.60 - 1.10 mg/dL    GFR MDRD Af Amer >60 >60 mL/min/1.73m2    GFR MDRD Non Af Amer >60 >60 mL/min/1.73m2           Assessment/Plan:  1. Metabolic encephalopathy  Hallucinations resolved. Decrease Seroquel to 12.5 mg two times a day. Goal is to wean off. D/c prn dose.    2. Acute cystitis with hematuria   patient continues with Ansari  catheter.  Urine culture grew pseudomonas. She was treated with IM Rocephin and transitioned onto oral Cipro.   Catheter changed.  D/c Catheter on Monday and initiate bladder scan protocol.    3. Urinary retention   continues with Camargo catheter.  We will attempt to DC once UTI is treated.   4. Shuffling gait    Questionable Parkinson's. Also correlating symptoms of mild cogwheel rigidity and upper arm tremors.   She is followed by Dr. Shepherd at [facility]. Message left for call back for consult.   Pt has follow up with Neurology on 7/14.    5. Spinal stenosis, unspecified spinal region   continues on lidocaine patch and Tylenol for pain.   6. Vitamin B12 deficiency   continues on supplement.         35 minutes spent of which greater than 50% was spent face to face interviewing pt, reviewing medications, UTI treatment and parkinsons symptoms.     Additional 45 minutes spent in pt room talking to pt with daughter on speaker phone discussing parkinson symptoms, including treatment of, previous neurology consults, UTI, catheter use and removal of, cognition, medications and laboratory.     Electronically signed by: Monik Becerra, WAQAR

## 2021-06-09 NOTE — PROGRESS NOTES
[facility]      Facility:    [facility] [[id number]]  Code Status: FULL CODE      Chief Complaint/Reason for Visit:  Chief Complaint   Patient presents with     H & P     Metabolic encephalopathy of unclear etiology, shuffling gait with normal work-up including MRI of the brain, urinary retention ongoing issue       HPI:   Alice is a 80 y.o. female who was recently admitted to the hospital on 6/18/2020.  She was brought in secondary to delirium and was thought it was delirium versus acute urinary retention.  She have a shuffling gait but work-up MRI of the brain 622 do not show any acute abnormalities or any Parkinson's.  Her delirium seemed to resolve in the hospital.  She was diagnosed with spinal stenosis question progression of chronic disease with shuffling gait and partial urinary retention she was evaluated with cauda equina syndrome and does not appear to be that at this time.  Does have been vitamin B12 deficiency but B12 is not deficient at this time is actually high greater than 2000.  Patient was treated appropriately and transferred here to the TCU at [facility] in stable condition.    Patient looks frail but definitely looks bradykinetic.  I was working with her with therapies at this time she did stand but did get dizzy she is also having some blood pressure drops in the morning but then does return to normal down to 87 systolic and then up to 140.  She denies any other issues at this time.    Past Medical History:  Past Medical History:   Diagnosis Date     Arthritis      Cancer (H)     melanomia     Hypertension      Melanoma (H)            Surgical History:  Past Surgical History:   Procedure Laterality Date     CATARACT EXTRACTION Bilateral     2014     KNEE ARTHROSCOPY Bilateral      NECK SURGERY       REPLACEMENT TOTAL KNEE Left 1/5/2017    Procedure: LEFT TOTAL KNEE ARTHROPLASTY COMPUTER ASSIST;  Surgeon: Micah Gallagher MD;   Location: [facility] OR;  Service:        Family History:   History reviewed. No pertinent family history.    Social History:    Social History     Socioeconomic History     Marital status:      Spouse name: None     Number of children: None     Years of education: None     Highest education level: None   Occupational History     None   Social Needs     Financial resource strain: None     Food insecurity     Worry: None     Inability: None     Transportation needs     Medical: None     Non-medical: None   Tobacco Use     Smoking status: Never Smoker   Substance and Sexual Activity     Alcohol use: No     Drug use: No     Sexual activity: None   Lifestyle     Physical activity     Days per week: None     Minutes per session: None     Stress: None   Relationships     Social connections     Talks on phone: None     Gets together: None     Attends Judaism service: None     Active member of club or organization: None     Attends meetings of clubs or organizations: None     Relationship status: None     Intimate partner violence     Fear of current or ex partner: None     Emotionally abused: None     Physically abused: None     Forced sexual activity: None   Other Topics Concern     None   Social History Narrative     None          Review of Systems   Constitutional:        Patient denies any pain fevers chills nausea vomit diarrhea change in vision hearing taste or smell weakness one-sided chest pain shortness of breath.  She does claim to be dizzy at times and usually when she is standing.  Which would indicate possibly orthostasis.  The remainder the view of systems is negative.  And she still has a Camargo catheter in at this time.       Vitals:    06/26/20 1106   BP: 141/74   Pulse: 60   Resp: 18   Temp: 98.1  F (36.7  C)   SpO2: 96%       Physical Exam  Constitutional:       General: She is not in acute distress.  HENT:      Head: Normocephalic and atraumatic.   Pulmonary:      Effort: Pulmonary effort  is normal. No respiratory distress.   Abdominal:      General: Abdomen is flat. There is no distension.   Neurological:      Mental Status: She is alert. Mental status is at baseline.   Psychiatric:         Mood and Affect: Mood normal.         Behavior: Behavior normal.         Medication List:  Current Outpatient Medications   Medication Sig     acetaminophen (TYLENOL) 325 MG tablet Take 2 tablets (650 mg total) by mouth every 4 (four) hours as needed for pain or fever.     cyanocobalamin (VITAMIN B-12) 1000 MCG tablet Take 1,000 mcg by mouth daily.     fish oil-omega-3 fatty acids (FISH OIL) 300-1,000 mg capsule Take 1 g by mouth daily.     lidocaine 4 % patch Place 2 patches on the skin daily. Remove and discard patch with 12 hours or as directed by MD.     melatonin 3 mg Tab tablet Take 1 tablet (3 mg total) by mouth at bedtime as needed.     melatonin 3 mg Tab tablet Take 2 tablets (6 mg total) by mouth at bedtime.     metoprolol succinate (TOPROL-XL) 25 MG Take 25 mg by mouth at bedtime.      polyethylene glycol (MIRALAX) 17 gram packet Take 1 packet (17 g total) by mouth daily as needed.     QUEtiapine (SEROQUEL) 25 MG tablet Take 0.5 tablets (12.5 mg total) by mouth every 6 (six) hours as needed (Agitation that is not redirectable.).     QUEtiapine (SEROQUEL) 25 MG tablet Take 0.5 tablets (12.5 mg total) by mouth 3 (three) times a day.     senna-docusate (PERICOLACE) 8.6-50 mg tablet Take 1 tablet by mouth daily.       Labs: Hospital labs are as follows; vitamin B12 was greater than 2000, TSH was 0.87, CRP was 0.6, MRI of the lumbar spine was done secondary to spinal stenosis.      Assessment:    ICD-10-CM    1. Metabolic encephalopathy  G93.41    2. Shuffling gait  R26.89    3. Urinary retention  R33.9    4. Spinal stenosis, unspecified spinal region  M48.00    5. Vitamin B12 deficiency  E53.8        Plan: Plan at this time will check orthostatic blood pressures at this time and continue with physical and  Occupational Therapy.  Speech therapy would evaluate for cognition at this time and will keep the Camargo catheter in and plan is to get her back to see urologist.  She will also see outpatient neurology and likely some of her symptoms may be possibly from the spinal stenosis or they could be from orthostatic hypotension.  I will continue to monitor above medical problems and no other changes to care plan at this time.        Electronically signed by: Varinder Hale,

## 2021-06-09 NOTE — PROGRESS NOTES
Code Status:  FULL CODE  Visit Type: Problem Visit     Facility:  [facility] [place] [[id number]]             History of Present Illness: Alice Lawson is a 80 y.o. female who I am seeing today for follow up on the TCU, secondary to neurological symptoms.. Pt recently hospitalized on 6/18/2020.  Hypertension, vitamin B12 deficiency, urinary incontinence, pulmonary nodule, generalized weakness, failure to thrive and physical deconditioning.  Patient hospitalized with metabolic encephalopathy.  Patient treated for UTI secondary to her acute delirium.  She did complete a 5-day course of Cefdinir. However urine culture on 6/18/2020 was negative.  Post antibiotic treatment her delirium  did clear somewhat. However patient presented with some urinary retention. She had elevated post void residuals.  She was started on a course of tamsulosin initially.  However however after consult with urology a Camargo catheter was placed.  Is recommended for a voiding trial in 1 to 2 weeks at the TCU. Patient with underlying spinal stenosis. There was some question of progression of her chronic disease due to his shuffling gait and new onset of urinary retention.  Patient was evaluated for cauda equina syndrome.  Patient underwent a CT of the spine on 6/22 which demonstrated several multilevel spinal stenosis and degenerative disease.  Orthopedics was consulted who ordered a follow-up with MRI.  MRI was negative.  On 6/21 patient became very agitated and combative.  Hospital associated delirium worsened and patient required one-on-one over the weekend.  Psychiatry was consulted and Seroquel was added.  Patient observed to have visual hallucinations and neurocognitive symptoms concerning for the possibility of Parkinson's or other neurocognitive disorder such as Lewy body.  However a MRI of the head and brain on 6/22 did not demonstrate any acute abnormalities.  She has a follow-up with neurology clinic on July 8.      Today  patient was ambulating in the hallway with therapy and she became very unstable with altered gait.  She was assisted to a seated position.  Blood pressure at the time was 104/69 with a heart rate of 68 sitting.  I did have her stand.  Blood pressure was 71/49 with a heart rate of 91.  Patient reports feeling dizzy.  Patient has a history of hypertension.  She continues to have metoprolol 25 mg at bedtime.  During hospitalization her losartan was discontinued secondary to low BP.  She was recently started on Flomax for urinary retention.  This could be contributing to some orthostasis as well as recent addition of Sinemet for Parkinson's .  Fluids were encouraged.  And blood pressure came up to 145/78 sitting and standing 128/64.  Patient has been weaned off Seroquel.  Patient is voiding adequately.  Recent follow-up UA UC was negative.  Patient continues with some episodes of confusion.      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     Parasomnia 01/18/2020     Solitary pulmonary nodule 01/18/2020     Aortic valve insufficiency 06/17/2020     Weakness 06/18/2020     Vitamin B12 deficiency (non anemic) 06/18/2020     Generalized muscle weakness 06/18/2020     Acute cystitis without hematuria      Acute metabolic encephalopathy 06/19/2020     Delirium 06/21/2020     Complex care coordination 06/21/2020     Spinal stenosis of lumbar region without neurogenic claudication 06/21/2020      Cognitive and behavioral changes      Sleep difficulties      Stress reaction with psychomotor agitation      Resolved Ambulatory Problems     Diagnosis Date Noted     ACP (advance care planning) 06/18/2020     Past Medical History:   Diagnosis Date     Arthritis      Cancer (H)      Hypertension      Melanoma (H)      Current Outpatient Medications   Medication Sig     acetaminophen (TYLENOL) 325 MG tablet Take 2 tablets (650 mg total) by mouth every 4 (four) hours as needed for pain or fever.     carbidopa-levodopa (SINEMET)  mg per tablet Take 1 tablet by mouth 3 (three) times a day.     cyanocobalamin (VITAMIN B-12) 1000 MCG tablet Take 1,000 mcg by mouth daily.     fish oil-omega-3 fatty acids (FISH OIL) 300-1,000 mg capsule Take 1 g by mouth daily.     lidocaine 4 % patch Place 2 patches on the skin daily. Remove and discard patch with 12 hours or as directed by MD.     melatonin 3 mg Tab tablet Take 1 tablet (3 mg total) by mouth at bedtime as needed.     metoprolol succinate (TOPROL-XL) 25 MG Take 25 mg by mouth at bedtime.      polyethylene glycol (MIRALAX) 17 gram packet Take 1 packet (17 g total) by mouth daily as needed.     senna-docusate (PERICOLACE) 8.6-50 mg tablet Take 1 tablet by mouth daily.     tamsulosin (FLOMAX) 0.4 mg cap Take 0.4 mg by mouth.       Allergies   Allergen Reactions     Hydrochlorothiazide Other (See Comments)     Foggy feeling (electrolyte imbalance and ended up in hospital)     Rosuvastatin      Other reaction(s): *Unknown, caused abd wall pain         Review of Systems  No fevers or chills. No headache, lightheadedness or dizziness. No SOB, chest pains or palpitations. Appetite is good. No nausea, vomiting, constipation or diarrhea.  Denies any dysuria frequency burning or pain on today's exam. Hx of Urinary retention. Denies any pain in her legs. Otherwise review of systems are negative.     Physical Exam  PHYSICAL EXAMINATION:  General: Awake, Alert, oriented x3,  appropriately, follows simple commands, conversant  VS: /70, pulse 81, respirations 16, temperature 97.7, O2 sat 97% on RA. W 178.8 lbs  HEENT:PERRLA, Pink conjunctiva, anicteric sclerae, moist oral mucosa. Facial symmetry. Tongue midline.   NECK: Supple, without any lymphadenopathy, or masses  CVS:  S1  S2, without murmur or gallop.   LUNG: Clear to auscultation, No wheezes, rales or rhonci.  BACK: No kyphosis of the thoracic spine  ABDOMEN: Soft, nontender to palpation, with positive bowel sounds  EXTREMITIES: Moves both upper and lower extremities with generalized weakness, trace pedal edema, no calf tenderness  SKIN: Warm and dry, no rashes or erythema noted  NEUROLOGIC:  pulses palpable. Equal . Mild cogwheel rigidity of the upper extremities.   PSYCHIATRIC: Mild cognitive impairment noted.      Labs:   Lab Results   Component Value Date    WBC 5.9 07/16/2020    HGB 12.7 07/16/2020    HCT 38.9 07/16/2020    MCV 96 07/16/2020     07/16/2020     Results for orders placed or performed in visit on 07/16/20   Basic Metabolic Panel   Result Value Ref Range    Sodium 139 136 - 145 mmol/L    Potassium 4.4 3.5 - 5.0 mmol/L    Chloride 105 98 - 107 mmol/L    CO2 26 22 - 31 mmol/L    Anion Gap, Calculation 8 5 - 18 mmol/L    Glucose 100 70 - 125 mg/dL    Calcium 9.1 8.5 - 10.5 mg/dL    BUN 9 8 - 28 mg/dL    Creatinine 0.85 0.60 - 1.10 mg/dL    GFR MDRD Af Amer >60 >60 mL/min/1.73m2    GFR MDRD Non Af Amer >60 >60 mL/min/1.73m2           Assessment/Plan:  1. Metabolic encephalopathy  Pt continues with intermittent confusion. She has been weaned off Seroquel.   Sundowning improving.     2.  Orthostatic hypotension 50 pt drop today with standing.   Decrease Metoprolol to 12.5mg Q HS.   Losartan d/c during hospitalization.   D/c Flomax.   Stat BMP and CBC    3. Acute cystitis with hematuria  Pt has completed her Cipro.  Camargo catheter removed.  Hx of urinary retention on Flomax. I will d/c this due to  orthostatic hypotension. Monitor for increased symptoms or retention.    4. Shuffling gait  Recently started on Sinemet.      5. Spinal stenosis, unspecified spinal region   continues on lidocaine patch and Tylenol for pain.   6. Vitamin B12 deficiency   continues on supplement.       Electronically signed by: Monik Becerra, WAQAR

## 2021-06-09 NOTE — PROGRESS NOTES
Code Status:  FULL CODE  Visit Type: Problem Visit     Facility:  [facility] [[id number]]             History of Present Illness: Alice Lawson is a 80 y.o. female who I am seeing today for follow up on the TCU, secondary to neurological symptoms.. Pt recently hospitalized on 6/18/2020.  Hypertension, vitamin B12 deficiency, urinary incontinence, pulmonary nodule, generalized weakness, failure to thrive and physical deconditioning.  Patient hospitalized with metabolic encephalopathy.  Patient treated for UTI secondary to her acute delirium.  She did complete a 5-day course of Cefdinir. However urine culture on 6/18/2020 was negative.  Post antibiotic treatment her delirium  did clear somewhat. However patient presented with some urinary retention.  He had elevated post void residuals.  She was started on a course of tamsulosin initially.  However however after consult with urology a Camargo catheter was placed.  Is recommended for a voiding trial in 1 to 2 weeks at the TCU. Patient with underlying spinal stenosis. There was some question of progression of her chronic disease due to his shuffling gait and new onset of urinary retention.  Patient was evaluated for cauda equina syndrome.  Patient underwent a CT of the spine on 6/22 which demonstrated several multilevel spinal stenosis and degenerative disease.  Orthopedics was consulted who ordered a follow-up with MRI.  MRI was negative.  On 6/21 patient became very agitated and combative.  Hospital associated delirium worsened and patient required one-on-one over the weekend.  Psychiatry was consulted and Seroquel was added.  Patient observed to have visual hallucinations and neurocognitive symptoms concerning for the possibility of Parkinson's or other neurocognitive disorder such as Lewy body.  However a MRI of the head and brain on 6/22 did not demonstrate any acute abnormalities.  She has a follow-up with neurology clinic on July 8.      Today  patient sitting up in bedside chair.  Patient currently being weaned off her Seroquel.  Today she received 12.5 mg in the a.m.  This will be her last dose.  No further hallucinations however she does report some vivid dreams. Cognition appears to be improving.  She does have some confusion in the late evening according to nursing staff. No cognitive impairment at baseline per her daughter. Pt continues with ansari catheter. She recently grew pseudomonas in her urine. She was treated for UTI during hospitalization however culture showed no growth. She was given 1 dose of IM Rocephin and transitioned onto oral Cipro at the TCU. She is a febrile. Nursing to remove catheter today and start voiding trial. She was given tamsulosin briefly during hospitalization however this was discontinued. Pt continues with symptoms consistent with parkinsons. She has shuffling gait, spatial awareness difficulty with freezing at doorways or thresholds, she has stooped posturing with ambulation. She has occasional tremors with activity and mild cogwheel rigidity. I did speak with her neurologist today, Dr. Shepherd who has seen her in the past. He reports he has not seen pt in a year. He was seeing her previously for polyneuropathy. She does have underlying spinal stenosis. She reports previously having pain in both thighs with burning. However denies any like pain today. She continues on tylenol nad lidocaine for pain. She has a follow up appointment on 7/14. I did discuss possible trial of Sinemet however he would like to refrain from doing so until he is able to evaluate her.           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     Parasomnia 01/18/2020     Solitary pulmonary nodule 01/18/2020     Aortic valve insufficiency 06/17/2020     Weakness 06/18/2020     ACP (advance care planning) 06/18/2020     Vitamin B12 deficiency (non anemic) 06/18/2020     Generalized muscle weakness 06/18/2020     Acute cystitis without hematuria      Acute metabolic encephalopathy 06/19/2020     Delirium 06/21/2020     Complex care coordination 06/21/2020     Spinal stenosis of lumbar region without neurogenic claudication 06/21/2020     Cognitive and behavioral changes      Sleep difficulties      Stress reaction with psychomotor agitation      Resolved Ambulatory Problems     Diagnosis Date Noted     No Resolved Ambulatory Problems     Past Medical History:   Diagnosis Date     Arthritis      Cancer (H)      Hypertension      Melanoma (H)      Current Outpatient Medications   Medication Sig     acetaminophen (TYLENOL) 325 MG tablet Take 2 tablets (650 mg total) by mouth every 4 (four) hours as needed for pain or fever.     ciprofloxacin HCl (CIPRO) 250 MG tablet Take 250 mg by mouth 2 (two) times a day.     cyanocobalamin (VITAMIN B-12) 1000 MCG tablet Take 1,000 mcg by mouth daily.     fish oil-omega-3 fatty acids (FISH OIL) 300-1,000 mg capsule Take 1 g by mouth daily.     lidocaine 4 % patch Place 2 patches on the skin daily. Remove and discard patch with 12 hours or as directed by MD.     melatonin 3 mg Tab tablet Take 1 tablet (3 mg total) by mouth at bedtime as needed.     metoprolol succinate (TOPROL-XL) 25 MG Take 25 mg by mouth at bedtime.      polyethylene glycol (MIRALAX) 17 gram packet Take 1 packet (17 g total) by mouth daily as needed.     QUEtiapine (SEROQUEL) 25 MG tablet Take 0.5 tablets (12.5 mg total) by mouth 3 (three)  times a day. (Patient taking differently: Take 12.5 mg by mouth 2 (two) times a day. )     senna-docusate (PERICOLACE) 8.6-50 mg tablet Take 1 tablet by mouth daily.       Allergies   Allergen Reactions     Hydrochlorothiazide Other (See Comments)     Foggy feeling (electrolyte imbalance and ended up in hospital)     Rosuvastatin      Other reaction(s): *Unknown, caused abd wall pain         Review of Systems  No fevers or chills. No headache, lightheadedness or dizziness.+ hallucinations.  No SOB, chest pains or palpitations. Appetite is good. No nausea, vomiting, constipation or diarrhea. Continues with ansari catheter. Hx of Urinary retention.  Denies any pain in her legs. Otherwise review of systems are negative.     Physical Exam  PHYSICAL EXAMINATION:  General: Awake, Alert, oriented x3, appropriately, follows simple commands, conversant  VS: /67, pulse 77, respirations 16, temperature 98.1, O2 sat 100% on RA. W 170.4 lbs  HEENT:PERRLA, Pink conjunctiva, anicteric sclerae, moist oral mucosa. Facial symmetry. Tongue midline.   NECK: Supple, without any lymphadenopathy, or masses  CVS:  S1  S2, without murmur or gallop.   LUNG: Clear to auscultation, No wheezes, rales or rhonci.  BACK: No kyphosis of the thoracic spine  ABDOMEN: Soft, nontender to palpation, with positive bowel sounds  EXTREMITIES: Moves both upper and lower extremities with generalized weakness, trace pedal edema, no calf tenderness  SKIN: Warm and dry, no rashes or erythema noted  NEUROLOGIC:  pulses palpable. Equal . Mild cogwheel rigidity of the upper extremities. Stooped posturing with shuffling gait. Spatial awareness difficulty with turning at thresholds. Freeze like movements at thresholds or turns.   PSYCHIATRIC: Mild cognitive impairment noted. Poor word recall at times.       Labs:   Lab Results   Component Value Date    WBC 7.3 07/02/2020    HGB 11.7 (L) 07/02/2020    HCT 37.2 07/02/2020    MCV 98 07/02/2020      07/02/2020     Results for orders placed or performed in visit on 07/02/20   Basic Metabolic Panel   Result Value Ref Range    Sodium 140 136 - 145 mmol/L    Potassium 4.2 3.5 - 5.0 mmol/L    Chloride 106 98 - 107 mmol/L    CO2 27 22 - 31 mmol/L    Anion Gap, Calculation 7 5 - 18 mmol/L    Glucose 88 70 - 125 mg/dL    Calcium 9.4 8.5 - 10.5 mg/dL    BUN 18 8 - 28 mg/dL    Creatinine 0.84 0.60 - 1.10 mg/dL    GFR MDRD Af Amer >60 >60 mL/min/1.73m2    GFR MDRD Non Af Amer >60 >60 mL/min/1.73m2           Assessment/Plan:  1. Metabolic encephalopathy  Hallucinations resolved.  Sundowning noted in evening.   Last dose of Seroquel today.    2. Acute cystitis with hematuria  Continues on Cipro for UTI.   Urine culture grew pseudomonas.  D/c Catheter and initiate bladder scan protocol.    3. Shuffling gait    Questionable Parkinson's. Also correlating symptoms of mild cogwheel rigidity and upper arm tremors.   Consulted with Dr. Shepherd over phone today  Pt has follow up with Neurology on 7/14.    5. Spinal stenosis, unspecified spinal region   continues on lidocaine patch and Tylenol for pain.   6. Vitamin B12 deficiency   continues on supplement.     Total time spent for this visit was initially 60 minutes with 1/2 of time spent face to face with pt and 1/2 time spent collaborating with therapy and Dr. Shepherd in regards to neurological symptoms, treatment and follow up.     After I left facility pt daughter phoned at 5:30 pm and additional 30 minutes til 6 pm was spent on phone with daughterRaiza answering questions regarding medications, weaning of Seroquel, cognition, hallucinations, dreams, catheter removal, bladder scan protocol, use of Flomax, neurological symptoms, treatment  and consult Dr. Shepherd.     Electronically signed by: Monik Becerra, WAQAR

## 2021-06-09 NOTE — PROGRESS NOTES
Code Status:  FULL CODE  Visit Type: Problem Visit     Facility:  [facility] [[id number]]             History of Present Illness: Alice Lawson is a 80 y.o. female who I am seeing today for follow up on the TCU, secondary to neurological symptoms.. Pt recently hospitalized on 6/18/2020.  Hypertension, vitamin B12 deficiency, urinary incontinence, pulmonary nodule, generalized weakness, failure to thrive and physical deconditioning.  Patient hospitalized with metabolic encephalopathy.  Patient treated for UTI secondary to her acute delirium.  She did complete a 5-day course of Cefdinir. However urine culture on 6/18/2020 was negative.  Post antibiotic treatment her delirium  did clear somewhat. However patient presented with some urinary retention. She had elevated post void residuals.  She was started on a course of tamsulosin initially.  However however after consult with urology a Camargo catheter was placed.  Is recommended for a voiding trial in 1 to 2 weeks at the TCU. Patient with underlying spinal stenosis. There was some question of progression of her chronic disease due to his shuffling gait and new onset of urinary retention.  Patient was evaluated for cauda equina syndrome.  Patient underwent a CT of the spine on 6/22 which demonstrated several multilevel spinal stenosis and degenerative disease.  Orthopedics was consulted who ordered a follow-up with MRI.  MRI was negative.  On 6/21 patient became very agitated and combative.  Hospital associated delirium worsened and patient required one-on-one over the weekend.  Psychiatry was consulted and Seroquel was added.  Patient observed to have visual hallucinations and neurocognitive symptoms concerning for the possibility of Parkinson's or other neurocognitive disorder such as Lewy body.  However a MRI of the head and brain on 6/22 did not demonstrate any acute abnormalities.  She has a follow-up with neurology clinic on July 8.      Today  patient sitting up in bedside chair.  Patient with recent diagnosis of Parkinson's disease.  Recently seen by neurology and started on Sinemet.  Yesterday she started Sinemet twice daily.  Her gait has greatly improved with decreased shuffling.  She does have some spatial awareness when it comes to doorways.  Left cogwheel rigidity noted.  Patient did have some low blood pressures yesterday.  Fluids were pushed.  I did discontinue her Flomax  and her metoprolol was decreased to 12.5 mg at at bedtime.  Today blood pressures appear within normal limits. Lying bp today  Was 140/85 and standing 123/75.  Heart rate within normal limits. Patient reports dizziness improving.  Dizziness can be a side effect of Sinemet.  I did speak with her daughter on the phone in regards to her blood pressure and improvement with the use of her Sinemet.  We will need to give the medication some time to adjust in her system.  She was instructed per neurology to change positions slowly to combat some of the dizziness.  She is voiding adequately.  No signs or symptoms of urinary retention.  She does have some mild underlying cognitive impairment no further hallucinations.        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     Parasomnia 01/18/2020     Solitary pulmonary nodule 01/18/2020     Aortic valve insufficiency 06/17/2020     Weakness 06/18/2020     Vitamin B12 deficiency (non anemic) 06/18/2020      Generalized muscle weakness 06/18/2020     Acute cystitis without hematuria      Acute metabolic encephalopathy 06/19/2020     Delirium 06/21/2020     Complex care coordination 06/21/2020     Spinal stenosis of lumbar region without neurogenic claudication 06/21/2020     Cognitive and behavioral changes      Sleep difficulties      Stress reaction with psychomotor agitation      Resolved Ambulatory Problems     Diagnosis Date Noted     ACP (advance care planning) 06/18/2020     Past Medical History:   Diagnosis Date     Arthritis      Cancer (H)      Hypertension      Melanoma (H)      Current Outpatient Medications   Medication Sig     acetaminophen (TYLENOL) 325 MG tablet Take 2 tablets (650 mg total) by mouth every 4 (four) hours as needed for pain or fever.     carbidopa-levodopa (SINEMET)  mg per tablet Take 1 tablet by mouth 2 (two) times a day.      cyanocobalamin (VITAMIN B-12) 1000 MCG tablet Take 1,000 mcg by mouth daily.     fish oil-omega-3 fatty acids (FISH OIL) 300-1,000 mg capsule Take 1 g by mouth daily.     lidocaine 4 % patch Place 2 patches on the skin daily. Remove and discard patch with 12 hours or as directed by MD.     melatonin 3 mg Tab tablet Take 1 tablet (3 mg total) by mouth at bedtime as needed.     metoprolol succinate (TOPROL-XL) 25 MG Take 12.5 mg by mouth at bedtime.      polyethylene glycol (MIRALAX) 17 gram packet Take 1 packet (17 g total) by mouth daily as needed.     senna-docusate (PERICOLACE) 8.6-50 mg tablet Take 1 tablet by mouth daily.       Allergies   Allergen Reactions     Hydrochlorothiazide Other (See Comments)     Foggy feeling (electrolyte imbalance and ended up in hospital)     Rosuvastatin      Other reaction(s): *Unknown, caused abd wall pain         Review of Systems  No fevers or chills. No headache, lightheadedness. Occasional dizziness. No SOB, chest pains or palpitations. Appetite is good. No nausea, vomiting, constipation or diarrhea.  Denies any  dysuria frequency burning or pain on today's exam. Hx of Urinary retention. Denies any pain in her legs. Otherwise review of systems are negative.     Physical Exam  PHYSICAL EXAMINATION:  General: Awake, Alert, oriented x3, appropriately, follows simple commands, conversant  VS: /82, pulse 91, respirations 16, temperature 97.2, O2 sat 100% on RA. W 178.8 lbs  HEENT:PERRLA, Pink conjunctiva, anicteric sclerae, moist oral mucosa. Facial symmetry. Tongue midline.   NECK: Supple, without any lymphadenopathy, or masses  CVS:  S1  S2, without murmur or gallop.   LUNG: Clear to auscultation, No wheezes, rales or rhonci.  BACK: No kyphosis of the thoracic spine  ABDOMEN: Soft, nontender to palpation, with positive bowel sounds  EXTREMITIES: Moves both upper and lower extremities with generalized weakness, trace pedal edema, no calf tenderness.  Patient did ambulate to doorway and back.  Less shuffling gait noted on today's visit.  She does continue with some spatial awareness with doorways.  Issues  SKIN: Warm and dry, no rashes or erythema noted  NEUROLOGIC:  pulses palpable. Equal . Mild cogwheel rigidity of the upper extremities.  No tremor noted today.  PSYCHIATRIC: Mild cognitive impairment noted.      Labs:   Lab Results   Component Value Date    WBC 5.9 07/16/2020    HGB 12.7 07/16/2020    HCT 38.9 07/16/2020    MCV 96 07/16/2020     07/16/2020     Results for orders placed or performed in visit on 07/16/20   Basic Metabolic Panel   Result Value Ref Range    Sodium 139 136 - 145 mmol/L    Potassium 4.4 3.5 - 5.0 mmol/L    Chloride 105 98 - 107 mmol/L    CO2 26 22 - 31 mmol/L    Anion Gap, Calculation 8 5 - 18 mmol/L    Glucose 100 70 - 125 mg/dL    Calcium 9.1 8.5 - 10.5 mg/dL    BUN 9 8 - 28 mg/dL    Creatinine 0.85 0.60 - 1.10 mg/dL    GFR MDRD Af Amer >60 >60 mL/min/1.73m2    GFR MDRD Non Af Amer >60 >60 mL/min/1.73m2           Assessment/Plan:  1. Metabolic encephalopathy  Pt continues with  intermittent confusion. She has been weaned off Seroquel.   Sundowning improving.     2.  Orthostatic hypotension BP improved with pushing of fluids. Suspect some dehydration.   Flomax discontinued.   Metoprolol decreased to 12.5 mg.     3. Acute cystitis with hematuria  Pt has completed her Cipro.  Camargo catheter removed.  Hx of urinary retention on Flomax. I will d/c this due to orthostatic hypotension. Monitor for increased symptoms or retention.    4.   Parkinson's Recently started on Sinemet, titrated to two times a day yesterday.   Continue to monitor for dizziness. Remind to change positioning slowly.      5. Spinal stenosis, unspecified spinal region   continues on lidocaine patch and Tylenol for pain.   6. Vitamin B12 deficiency   continues on supplement.       Electronically signed by: Monik Becerra CNP

## 2021-06-09 NOTE — PROGRESS NOTES
Code Status:  FULL CODE  Visit Type: Problem Visit     Facility:  [facility] [[id number]]             History of Present Illness: Alice Lawson is a 80 y.o. female who I am seeing today for follow up on the TCU, secondary to neurological symptoms.. Pt recently hospitalized on 6/18/2020.  Hypertension, vitamin B12 deficiency, urinary incontinence, pulmonary nodule, generalized weakness, failure to thrive and physical deconditioning.  Patient hospitalized with metabolic encephalopathy.  Patient treated for UTI secondary to her acute delirium.  She did complete a 5-day course of Cefdinir. However urine culture on 6/18/2020 was negative.  Post antibiotic treatment her delirium  did clear somewhat. However patient presented with some urinary retention.  He had elevated post void residuals.  She was started on a course of tamsulosin initially.  However however after consult with urology a Camargo catheter was placed.  Is recommended for a voiding trial in 1 to 2 weeks at the TCU. Patient with underlying spinal stenosis. There was some question of progression of her chronic disease due to his shuffling gait and new onset of urinary retention.  Patient was evaluated for cauda equina syndrome.  Patient underwent a CT of the spine on 6/22 which demonstrated several multilevel spinal stenosis and degenerative disease.  Orthopedics was consulted who ordered a follow-up with MRI.  MRI was negative.  On 6/21 patient became very agitated and combative.  Hospital associated delirium worsened and patient required one-on-one over the weekend.  Psychiatry was consulted and Seroquel was added.  Patient observed to have visual hallucinations and neurocognitive symptoms concerning for the possibility of Parkinson's or other neurocognitive disorder such as Lewy body.  However a MRI of the head and brain on 6/22 did not demonstrate any acute abnormalities.  She has a follow-up with neurology clinic on July 8.      Today  patient lying in bed.  Today patient had follow-up with neurology.  She was started on Sinemet.  She was started low-dose and taper upward.  Patient with classic presentation of Parkinson symptoms including mild cogwheel rigidity, shuffling gait with spatial awareness difficulty, stooped posture normal walking and occasional tremor.  Patient continues with some episodes of confusion.  Today she is very short on exam.  Patient does not want to be bothered at this time.  Requesting to nap.  She has no recollection of who I am.  She has been weaned off her Seroquel.  She had some complaints of burning with urination over the weekend.  A UA UC was obtained which was negative.  Post void residual also obtained which was less than 4 cc.  She does continue on Flomax.      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     Parasomnia 01/18/2020     Solitary pulmonary nodule 01/18/2020     Aortic valve insufficiency 06/17/2020     Weakness 06/18/2020     ACP (advance care planning) 06/18/2020     Vitamin B12 deficiency (non anemic) 06/18/2020     Generalized muscle weakness 06/18/2020     Acute cystitis without hematuria      Acute metabolic encephalopathy 06/19/2020     Delirium 06/21/2020     Complex care coordination 06/21/2020     Spinal stenosis of lumbar region without neurogenic claudication 06/21/2020     Cognitive and behavioral changes      Sleep difficulties      Stress reaction with  psychomotor agitation      Resolved Ambulatory Problems     Diagnosis Date Noted     No Resolved Ambulatory Problems     Past Medical History:   Diagnosis Date     Arthritis      Cancer (H)      Hypertension      Melanoma (H)      Current Outpatient Medications   Medication Sig     carbidopa-levodopa (SINEMET)  mg per tablet Take 1 tablet by mouth 3 (three) times a day.     acetaminophen (TYLENOL) 325 MG tablet Take 2 tablets (650 mg total) by mouth every 4 (four) hours as needed for pain or fever.     cyanocobalamin (VITAMIN B-12) 1000 MCG tablet Take 1,000 mcg by mouth daily.     fish oil-omega-3 fatty acids (FISH OIL) 300-1,000 mg capsule Take 1 g by mouth daily.     lidocaine 4 % patch Place 2 patches on the skin daily. Remove and discard patch with 12 hours or as directed by MD.     melatonin 3 mg Tab tablet Take 1 tablet (3 mg total) by mouth at bedtime as needed.     metoprolol succinate (TOPROL-XL) 25 MG Take 25 mg by mouth at bedtime.      polyethylene glycol (MIRALAX) 17 gram packet Take 1 packet (17 g total) by mouth daily as needed.     senna-docusate (PERICOLACE) 8.6-50 mg tablet Take 1 tablet by mouth daily.     tamsulosin (FLOMAX) 0.4 mg cap Take 0.4 mg by mouth.       Allergies   Allergen Reactions     Hydrochlorothiazide Other (See Comments)     Foggy feeling (electrolyte imbalance and ended up in hospital)     Rosuvastatin      Other reaction(s): *Unknown, caused abd wall pain         Review of Systems  No fevers or chills. No headache, lightheadedness or dizziness. No SOB, chest pains or palpitations. Appetite is good. No nausea, vomiting, constipation or diarrhea.  Denies any dysuria frequency burning or pain on today's exam. Hx of Urinary retention. Denies any pain in her legs. Otherwise review of systems are negative.     Physical Exam  PHYSICAL EXAMINATION:  General: Awake, Alert, oriented x3, appropriately, follows simple commands, conversant  VS: /70, P 74, R 16, T 97.9, O2 sat  95% on RA. W 169.4 lbs  HEENT:PERRLA, Pink conjunctiva, anicteric sclerae, moist oral mucosa. Facial symmetry. Tongue midline.   NECK: Supple, without any lymphadenopathy, or masses  CVS:  S1  S2, without murmur or gallop.   LUNG: Clear to auscultation, No wheezes, rales or rhonci.  BACK: No kyphosis of the thoracic spine  ABDOMEN: Soft, nontender to palpation, with positive bowel sounds  EXTREMITIES: Moves both upper and lower extremities with generalized weakness, trace pedal edema, no calf tenderness  SKIN: Warm and dry, no rashes or erythema noted  NEUROLOGIC:  pulses palpable. Equal . Mild cogwheel rigidity of the upper extremities.   PSYCHIATRIC: Mild cognitive impairment noted.Pt unable to recall who I am today. Pt very short on exam.       Labs:   Lab Results   Component Value Date    WBC 7.3 07/02/2020    HGB 11.7 (L) 07/02/2020    HCT 37.2 07/02/2020    MCV 98 07/02/2020     07/02/2020     Results for orders placed or performed in visit on 07/02/20   Basic Metabolic Panel   Result Value Ref Range    Sodium 140 136 - 145 mmol/L    Potassium 4.2 3.5 - 5.0 mmol/L    Chloride 106 98 - 107 mmol/L    CO2 27 22 - 31 mmol/L    Anion Gap, Calculation 7 5 - 18 mmol/L    Glucose 88 70 - 125 mg/dL    Calcium 9.4 8.5 - 10.5 mg/dL    BUN 18 8 - 28 mg/dL    Creatinine 0.84 0.60 - 1.10 mg/dL    GFR MDRD Af Amer >60 >60 mL/min/1.73m2    GFR MDRD Non Af Amer >60 >60 mL/min/1.73m2           Assessment/Plan:  1. Metabolic encephalopathy  Pt continues with intermittent confusion. She has been weaned off Seroquel.   Sundowning improving.     2. Acute cystitis with hematuria  Pt has completed her Cipro.  Camargo catheter removed.  Hx of urinary retention on Flomax.    3. Shuffling gait  Pt seen by neurology today and started on Sinemet with gradual dose increase.     5. Spinal stenosis, unspecified spinal region   continues on lidocaine patch and Tylenol for pain.   6. Vitamin B12 deficiency   continues on supplement.        Electronically signed by: Monik Becerra, CNP

## 2021-06-14 NOTE — TELEPHONE ENCOUNTER
----- Message from Kimi Restrepo sent at 1/22/2021 11:28 AM CST -----  Regarding: Call back  Please call patient ASAP in regards to blood pressure.. Patient spoke with primary this morning and they informed her to call our office back and talk to someone ASAP.  Please call [phone]

## 2021-06-14 NOTE — TELEPHONE ENCOUNTER
Recommendations discussed with pt.  Pt verbalized understanding and had no questions.  Metoprolol discontinued.  Pt will see PCP in the interim, and will schedule appt with Dr Roldan.  Appt scheduled 2/11/21.  dora

## 2021-06-14 NOTE — TELEPHONE ENCOUNTER
"Pt c/o feeling \"woozy\" and reports low blood pressures.  Date Time BP P Time BP P   01/22/21 0900 78/59 97 stand 101/56 103   01/21/21  69/51 94 stand 126/51 81     Another reading from today:  Sitting   116/78  HR 87  Standing  116/73  HR 93    Later in the afternoon her BP has been 140s/80s, HR 80s.    Pt is not on any medications from a cardiology standpoint that would lower her BP, metoprolol 12.5 mg was discontinued 01/12/21.    Pt is on Sinemet, and has f/u scheduled with Briana 02/23/21.  Called Briana and they will contact pt to set up sooner appt.  Pt scheduled with Dr Roldan 2/11/21.  Pt is drinking about 50 oz water daily.  Pt is wearing compression socks.    Dr Roldan - any other recommendations?  -rah    "

## 2021-06-14 NOTE — TELEPHONE ENCOUNTER
Pt c/o BP dropping when she stands from sitting or lying position.  She has been feeling lightheaded and dizzy, and has fallen on several occassions.    Pt is taking 12.5 mg metoprolol succinate at bedtime.    This mornings readings:  95/62  HR 84  75/53  HR 87 after standing    Dr Roldan - pt due to see you but there are no openings and Feb calendar is not out yet.  Do you have any recommendations?  -beto

## 2021-06-14 NOTE — TELEPHONE ENCOUNTER
Recommendations discussed with pt.  Pt will go when her BP is low again, denies need to go now.  Also confirmed with pt that her appt with Briana was moved up to 2/9/21.  dora

## 2021-06-14 NOTE — TELEPHONE ENCOUNTER
She can stop the metoprolol and try to keep her self well-hydrated but this seems unusual for the small amount of metoprolol.  My recommendation is that she be seen either by her primary care provider or by brittany.  Can we determine what she prefers to do and arrange it as soon as possible.  Thank you.mdg

## 2021-06-14 NOTE — TELEPHONE ENCOUNTER
----- Message from Kimi Restrepo sent at 1/12/2021  9:48 AM CST -----  Regarding: PAOLO- CALL BACK  Please call patient in regards to blood pressure reading and her not feeling well. She can tell her BP drops when she stands. Please call patient [phone]  95/62/84- 9:22 AM  99/62/82  Patient stood up and it was 75/53/87

## 2021-06-14 NOTE — PROGRESS NOTES
Phone call from patient regarding blood pressure readings.  Patient's labile blood pressures with orthostatic changes continues.  The patient has no direct symptoms of lightheadedness or presyncope.  Patient took readings prior to pulling on her compression stockings today and her readings were better after placing the compression stockings.  I made the following recommendations:  Compression stockings to be pulled on first thing in the morning  Patient to consume at least 20 ounces of noncaffeinated liquid prior to first blood pressure reading.  Increase total daily fluid intake to 80 ounces daily  Liberalize sodium intake.  Patient will contact Dr. Roldan's office regarding her status on Monday or Tuesday.

## 2021-06-15 ENCOUNTER — COMMUNICATION - HEALTHEAST (OUTPATIENT)
Dept: CARDIOLOGY | Facility: CLINIC | Age: 81
End: 2021-06-15

## 2021-06-15 PROBLEM — Z96.659 S/P TOTAL KNEE ARTHROPLASTY: Status: ACTIVE | Noted: 2017-01-05

## 2021-06-15 NOTE — PROGRESS NOTES
Review of systems done with patient over the phone. Positive for weight loss. All other review of systems within normal limits.

## 2021-06-15 NOTE — TELEPHONE ENCOUNTER
Return call to daughter - she wants to schedule event monitor.  Call transferred back to scheduling.  -beto

## 2021-06-15 NOTE — PROGRESS NOTES
----- Message -----  From: Frank Roldan MD  Sent: 2/11/2021  11:22 AM CST  To: Angelique Frey RN    Can we please arrange a 2-week event monitor.  Daughter is able to help her put it on if they mail it to her.  The indication is [email or web address]  ----------------------------------------------------------------------  Event monitor ordered, message sent to scheduling.  dora

## 2021-06-15 NOTE — PATIENT INSTRUCTIONS - HE
It was nice to visit with you today.  We talked about utilizing an event monitor.  I will ask my nurse to help arrange.  I am going to try to connect with your neurologist.  Please call with any increasing symptoms or concerns.  My nurse is Angelique and her number is [phone].

## 2021-06-15 NOTE — TELEPHONE ENCOUNTER
----- Message from Kenyetta Dubon sent at 2/16/2021  9:11 AM CST -----      Caller: Patient's daughter  Primary cardiologist: Dr. Roldan    Detailed reason for call: Patient's daughter stated that she needed to speak with you. Please advise    Best phone number: tammy- [phone]  Best time to contact: today  Ok to leave a detailed message? yes  Device? no    Additional Info:

## 2021-06-16 PROBLEM — Z96.659 ARTIFICIAL KNEE JOINT PRESENT: Status: ACTIVE | Noted: 2020-01-18

## 2021-06-16 PROBLEM — I35.1 AORTIC VALVE INSUFFICIENCY: Status: ACTIVE | Noted: 2020-06-17

## 2021-06-16 PROBLEM — G61.9 INFLAMMATORY AND TOXIC NEUROPATHY (H): Status: ACTIVE | Noted: 2020-01-18

## 2021-06-16 PROBLEM — I67.9 CEREBROVASCULAR DISEASE: Status: ACTIVE | Noted: 2020-01-18

## 2021-06-16 PROBLEM — R91.1 SOLITARY PULMONARY NODULE: Status: ACTIVE | Noted: 2020-01-18

## 2021-06-16 PROBLEM — M20.42 ACQUIRED HAMMER TOE OF LEFT FOOT: Status: ACTIVE | Noted: 2020-01-18

## 2021-06-16 PROBLEM — G62.2 INFLAMMATORY AND TOXIC NEUROPATHY (H): Status: ACTIVE | Noted: 2020-01-18

## 2021-06-16 PROBLEM — M48.061 SPINAL STENOSIS OF LUMBAR REGION WITHOUT NEUROGENIC CLAUDICATION: Status: ACTIVE | Noted: 2020-06-21

## 2021-06-16 PROBLEM — R53.1 WEAKNESS: Status: ACTIVE | Noted: 2020-06-18

## 2021-06-16 PROBLEM — M17.12 ARTHRITIS OF LEFT KNEE: Status: ACTIVE | Noted: 2020-01-18

## 2021-06-16 PROBLEM — R42 DIZZINESS: Status: ACTIVE | Noted: 2020-01-18

## 2021-06-16 PROBLEM — G47.50 PARASOMNIA: Status: ACTIVE | Noted: 2020-01-18

## 2021-06-16 PROBLEM — M62.81 GENERALIZED MUSCLE WEAKNESS: Status: ACTIVE | Noted: 2020-06-18

## 2021-06-16 PROBLEM — E53.8 VITAMIN B12 DEFICIENCY (NON ANEMIC): Status: ACTIVE | Noted: 2020-06-18

## 2021-06-16 PROBLEM — Z71.89 COMPLEX CARE COORDINATION: Status: ACTIVE | Noted: 2020-06-21

## 2021-06-16 PROBLEM — L91.0: Status: ACTIVE | Noted: 2020-01-18

## 2021-06-16 PROBLEM — G93.41 ACUTE METABOLIC ENCEPHALOPATHY: Status: ACTIVE | Noted: 2020-06-19

## 2021-06-16 PROBLEM — R41.0 DELIRIUM: Status: ACTIVE | Noted: 2020-06-21

## 2021-06-16 PROBLEM — D36.9 BENIGN NEOPLASM: Status: ACTIVE | Noted: 2020-01-18

## 2021-06-16 NOTE — TELEPHONE ENCOUNTER
----- Message from TARIK Aguilera sent at 4/16/2021  8:16 AM CDT -----  Regarding: MDG PATIENT  General phone call:    Caller: PATIENT      Primary cardiologist: MDG     Detailed reason for call: PATIENT HAVING TROUBLE W/ THE MANUEL MONITOR, SHE DID REACH OUT TO THE [employer], SHE WANTS TO TALK TO YOU ABOUT AN ALTERNATIVE.    Best phone number: [phone]    Best time to contact: ANY    Ok to leave a detailed message? YES    Device? NO    Additional Info:        === LVM to call [phone] to discuss.  -beto

## 2021-06-20 ENCOUNTER — HEALTH MAINTENANCE LETTER (OUTPATIENT)
Age: 81
End: 2021-06-20

## 2021-06-20 NOTE — LETTER
Letter by Monik Becerra CNP at      Author: Monik Becerra CNP Service: -- Author Type: --    Filed:  Encounter Date: 7/7/2020 Status: (Other)         Patient: Alice Lawson   MR Number: [id number]   YOB: 1940   Date of Visit: 7/7/2020     Code Status:  FULL CODE  Visit Type: Problem Visit     Facility:  [facility] [[id number]]             History of Present Illness: Alice Lawson is a 80 y.o. female who I am seeing today for follow up on the TCU, secondary to neurological symptoms.. Pt recently hospitalized on 6/18/2020.  Hypertension, vitamin B12 deficiency, urinary incontinence, pulmonary nodule, generalized weakness, failure to thrive and physical deconditioning.  Patient hospitalized with metabolic encephalopathy.  Patient treated for UTI secondary to her acute delirium.  She did complete a 5-day course of Cefdinir. However urine culture on 6/18/2020 was negative.  Post antibiotic treatment her delirium  did clear somewhat. However patient presented with some urinary retention.  He had elevated post void residuals.  She was started on a course of tamsulosin initially.  However however after consult with urology a Camargo catheter was placed.  Is recommended for a voiding trial in 1 to 2 weeks at the TCU. Patient with underlying spinal stenosis. There was some question of progression of her chronic disease due to his shuffling gait and new onset of urinary retention.  Patient was evaluated for cauda equina syndrome.  Patient underwent a CT of the spine on 6/22 which demonstrated several multilevel spinal stenosis and degenerative disease.  Orthopedics was consulted who ordered a follow-up with MRI.  MRI was negative.  On 6/21 patient became very agitated and combative.  Hospital associated delirium worsened and patient required one-on-one over the weekend.  Psychiatry was consulted and Seroquel was added.  Patient observed to have visual hallucinations and  neurocognitive symptoms concerning for the possibility of Parkinson's or other neurocognitive disorder such as Lewy body.  However a MRI of the head and brain on 6/22 did not demonstrate any acute abnormalities.  She has a follow-up with neurology clinic on July 8.      Today patient sitting up in bedside chair.  Patient currently being weaned off her Seroquel.  Today she received 12.5 mg in the a.m.  This will be her last dose.  No further hallucinations however she does report some vivid dreams. Cognition appears to be improving.  She does have some confusion in the late evening according to nursing staff. No cognitive impairment at baseline per her daughter. Pt continues with ansari catheter. She recently grew pseudomonas in her urine. She was treated for UTI during hospitalization however culture showed no growth. She was given 1 dose of IM Rocephin and transitioned onto oral Cipro at the TCU. She is a febrile. Nursing to remove catheter today and start voiding trial. She was given tamsulosin briefly during hospitalization however this was discontinued. Pt continues with symptoms consistent with parkinsons. She has shuffling gait, spatial awareness difficulty with freezing at doorways or thresholds, she has stooped posturing with ambulation. She has occasional tremors with activity and mild cogwheel rigidity. I did speak with her neurologist today, Dr. Shepherd who has seen her in the past. He reports he has not seen pt in a year. He was seeing her previously for polyneuropathy. She does have underlying spinal stenosis. She reports previously having pain in both thighs with burning. However denies any like pain today. She continues on tylenol nad lidocaine for pain. She has a follow up appointment on 7/14. I did discuss possible trial of Sinemet however he would like to refrain from doing so until he is able to evaluate her.           Active Ambulatory Problems     Diagnosis Date Noted   ? S/P total knee arthroplasty  01/05/2017   ? Essential hypertension with goal blood pressure less than 130/80    ? Acute blood loss as cause of postoperative anemia    ? Hyperlipidemia, unspecified hyperlipidemia type    ? Acquired hammer toe of left foot 01/18/2020   ? Arthritis of left knee 01/18/2020   ? Artificial knee joint present 01/18/2020   ? Benign neoplasm 01/18/2020   ? Cerebrovascular disease 01/18/2020   ? 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 Dizziness 01/18/2020   ? 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 Solitary pulmonary nodule 01/18/2020   ? Aortic valve insufficiency 06/17/2020   ? Weakness 06/18/2020   ? ACP (advance care planning) 06/18/2020   ? Vitamin B12 deficiency (non anemic) 06/18/2020   ? Generalized muscle weakness 06/18/2020   ? Acute cystitis without hematuria    ? Acute metabolic encephalopathy 06/19/2020   ? Delirium 06/21/2020   ? Complex care coordination 06/21/2020   ? Spinal stenosis of lumbar region without neurogenic claudication 06/21/2020   ? Cognitive and behavioral changes    ? Sleep difficulties    ? Stress reaction with psychomotor agitation      Resolved Ambulatory Problems     Diagnosis Date Noted   ? No Resolved Ambulatory Problems     Past Medical History:   Diagnosis Date   ? Arthritis    ? Cancer (H)    ? Hypertension    ? Melanoma (H)      Current Outpatient Medications   Medication Sig   ? acetaminophen (TYLENOL) 325 MG tablet Take 2 tablets (650 mg total) by mouth every 4 (four) hours as needed for pain or fever.   ? ciprofloxacin HCl (CIPRO) 250 MG tablet Take 250 mg by mouth 2 (two) times a day.   ? cyanocobalamin (VITAMIN B-12) 1000 MCG tablet Take 1,000 mcg by mouth daily.   ? fish oil-omega-3 fatty acids (FISH OIL) 300-1,000 mg capsule Take 1 g by mouth daily.   ? lidocaine 4 % patch Place 2 patches on the skin daily. Remove and discard patch with 12 hours or as directed by MD.   ? melatonin 3 mg Tab tablet Take 1 tablet (3 mg total) by mouth at bedtime  as needed.   ? metoprolol succinate (TOPROL-XL) 25 MG Take 25 mg by mouth at bedtime.    ? polyethylene glycol (MIRALAX) 17 gram packet Take 1 packet (17 g total) by mouth daily as needed.   ? QUEtiapine (SEROQUEL) 25 MG tablet Take 0.5 tablets (12.5 mg total) by mouth 3 (three) times a day. (Patient taking differently: Take 12.5 mg by mouth 2 (two) times a day. )   ? senna-docusate (PERICOLACE) 8.6-50 mg tablet Take 1 tablet by mouth daily.       Allergies   Allergen Reactions   ? Hydrochlorothiazide Other (See Comments)     Foggy feeling (electrolyte imbalance and ended up in hospital)   ? Rosuvastatin      Other reaction(s): *Unknown, caused abd wall pain         Review of Systems  No fevers or chills. No headache, lightheadedness or dizziness.+ hallucinations.  No SOB, chest pains or palpitations. Appetite is good. No nausea, vomiting, constipation or diarrhea. Continues with ansari catheter. Hx of Urinary retention.  Denies any pain in her legs. Otherwise review of systems are negative.     Physical Exam  PHYSICAL EXAMINATION:  General: Awake, Alert, oriented x3, appropriately, follows simple commands, conversant  VS: /67, pulse 77, respirations 16, temperature 98.1, O2 sat 100% on RA. W 170.4 lbs  HEENT:PERRLA, Pink conjunctiva, anicteric sclerae, moist oral mucosa. Facial symmetry. Tongue midline.   NECK: Supple, without any lymphadenopathy, or masses  CVS:  S1  S2, without murmur or gallop.   LUNG: Clear to auscultation, No wheezes, rales or rhonci.  BACK: No kyphosis of the thoracic spine  ABDOMEN: Soft, nontender to palpation, with positive bowel sounds  EXTREMITIES: Moves both upper and lower extremities with generalized weakness, trace pedal edema, no calf tenderness  SKIN: Warm and dry, no rashes or erythema noted  NEUROLOGIC:  pulses palpable. Equal . Mild cogwheel rigidity of the upper extremities. Stooped posturing with shuffling gait. Spatial awareness difficulty with turning at  thresholds. Freeze like movements at thresholds or turns.   PSYCHIATRIC: Mild cognitive impairment noted. Poor word recall at times.       Labs:   Lab Results   Component Value Date    WBC 7.3 07/02/2020    HGB 11.7 (L) 07/02/2020    HCT 37.2 07/02/2020    MCV 98 07/02/2020     07/02/2020     Results for orders placed or performed in visit on 07/02/20   Basic Metabolic Panel   Result Value Ref Range    Sodium 140 136 - 145 mmol/L    Potassium 4.2 3.5 - 5.0 mmol/L    Chloride 106 98 - 107 mmol/L    CO2 27 22 - 31 mmol/L    Anion Gap, Calculation 7 5 - 18 mmol/L    Glucose 88 70 - 125 mg/dL    Calcium 9.4 8.5 - 10.5 mg/dL    BUN 18 8 - 28 mg/dL    Creatinine 0.84 0.60 - 1.10 mg/dL    GFR MDRD Af Amer >60 >60 mL/min/1.73m2    GFR MDRD Non Af Amer >60 >60 mL/min/1.73m2           Assessment/Plan:  1. Metabolic encephalopathy  Hallucinations resolved.  Sundowning noted in evening.   Last dose of Seroquel today.    2. Acute cystitis with hematuria  Continues on Cipro for UTI.   Urine culture grew pseudomonas.  D/c Catheter and initiate bladder scan protocol.    3. Shuffling gait    Questionable Parkinson's. Also correlating symptoms of mild cogwheel rigidity and upper arm tremors.   Consulted with Dr. Shepherd over phone today  Pt has follow up with Neurology on 7/14.    5. Spinal stenosis, unspecified spinal region   continues on lidocaine patch and Tylenol for pain.   6. Vitamin B12 deficiency   continues on supplement.     Total time spent for this visit was initially 60 minutes with 1/2 of time spent face to face with pt and 1/2 time spent collaborating with therapy and Dr. Shepherd in regards to neurological symptoms, treatment and follow up.     After I left facility pt daughter phoned at 5:30 pm and additional 30 minutes til 6 pm was spent on phone with daughterRaiza answering questions regarding medications, weaning of Seroquel, cognition, hallucinations, dreams, catheter removal, bladder scan protocol, use of Flomax,  neurological symptoms, treatment  and consult Dr. Shepherd.     Electronically signed by: Monik Becerra, WAQAR

## 2021-06-20 NOTE — LETTER
Letter by Varinder Hale DO at      Author: Varinder Hale DO Service: -- Author Type: --    Filed:  Encounter Date: 6/26/2020 Status: (Other)         Patient: Alice Lawson   MR Number: [id number]   YOB: 1940   Date of Visit: 6/26/2020     [facility]      Facility:    [facility] [[id number]]  Code Status: FULL CODE      Chief Complaint/Reason for Visit:  Chief Complaint   Patient presents with   ? H & P     Metabolic encephalopathy of unclear etiology, shuffling gait with normal work-up including MRI of the brain, urinary retention ongoing issue       HPI:   Alice is a 80 y.o. female who was recently admitted to the hospital on 6/18/2020.  She was brought in secondary to delirium and was thought it was delirium versus acute urinary retention.  She have a shuffling gait but work-up MRI of the brain 622 do not show any acute abnormalities or any Parkinson's.  Her delirium seemed to resolve in the hospital.  She was diagnosed with spinal stenosis question progression of chronic disease with shuffling gait and partial urinary retention she was evaluated with cauda equina syndrome and does not appear to be that at this time.  Does have been vitamin B12 deficiency but B12 is not deficient at this time is actually high greater than 2000.  Patient was treated appropriately and transferred here to the TCU at [facility] in stable condition.    Patient looks frail but definitely looks bradykinetic.  I was working with her with therapies at this time she did stand but did get dizzy she is also having some blood pressure drops in the morning but then does return to normal down to 87 systolic and then up to 140.  She denies any other issues at this time.    Past Medical History:  Past Medical History:   Diagnosis Date   ? Arthritis    ? Cancer (H)     melanomia   ? Hypertension    ? Melanoma (H)            Surgical History:  Past Surgical  History:   Procedure Laterality Date   ? CATARACT EXTRACTION Bilateral     2014   ? KNEE ARTHROSCOPY Bilateral    ? NECK SURGERY     ? REPLACEMENT TOTAL KNEE Left 1/5/2017    Procedure: LEFT TOTAL KNEE ARTHROPLASTY COMPUTER ASSIST;  Surgeon: Micah Gallagher MD;  Location: [facility];  Service:        Family History:   History reviewed. No pertinent family history.    Social History:    Social History     Socioeconomic History   ? Marital status:      Spouse name: None   ? Number of children: None   ? Years of education: None   ? Highest education level: None   Occupational History   ? None   Social Needs   ? Financial resource strain: None   ? Food insecurity     Worry: None     Inability: None   ? Transportation needs     Medical: None     Non-medical: None   Tobacco Use   ? Smoking status: Never Smoker   Substance and Sexual Activity   ? Alcohol use: No   ? Drug use: No   ? Sexual activity: None   Lifestyle   ? Physical activity     Days per week: None     Minutes per session: None   ? Stress: None   Relationships   ? Social connections     Talks on phone: None     Gets together: None     Attends [employer] service: None     Active member of club or organization: None     Attends meetings of clubs or organizations: None     Relationship status: None   ? Intimate partner violence     Fear of current or ex partner: None     Emotionally abused: None     Physically abused: None     Forced sexual activity: None   Other Topics Concern   ? None   Social History Narrative   ? None          Review of Systems   Constitutional:        Patient denies any pain fevers chills nausea vomit diarrhea change in vision hearing taste or smell weakness one-sided chest pain shortness of breath.  She does claim to be dizzy at times and usually when she is standing.  Which would indicate possibly orthostasis.  The remainder the view of systems is negative.  And she still has a Camargo catheter in at this time.       Vitals:     06/26/20 1106   BP: 141/74   Pulse: 60   Resp: 18   Temp: 98.1  F (36.7  C)   SpO2: 96%       Physical Exam  Constitutional:       General: She is not in acute distress.  HENT:      Head: Normocephalic and atraumatic.   Pulmonary:      Effort: Pulmonary effort is normal. No respiratory distress.   Abdominal:      General: Abdomen is flat. There is no distension.   Neurological:      Mental Status: She is alert. Mental status is at baseline.   Psychiatric:         Mood and Affect: Mood normal.         Behavior: Behavior normal.         Medication List:  Current Outpatient Medications   Medication Sig   ? acetaminophen (TYLENOL) 325 MG tablet Take 2 tablets (650 mg total) by mouth every 4 (four) hours as needed for pain or fever.   ? cyanocobalamin (VITAMIN B-12) 1000 MCG tablet Take 1,000 mcg by mouth daily.   ? fish oil-omega-3 fatty acids (FISH OIL) 300-1,000 mg capsule Take 1 g by mouth daily.   ? lidocaine 4 % patch Place 2 patches on the skin daily. Remove and discard patch with 12 hours or as directed by MD.   ? melatonin 3 mg Tab tablet Take 1 tablet (3 mg total) by mouth at bedtime as needed.   ? melatonin 3 mg Tab tablet Take 2 tablets (6 mg total) by mouth at bedtime.   ? metoprolol succinate (TOPROL-XL) 25 MG Take 25 mg by mouth at bedtime.    ? polyethylene glycol (MIRALAX) 17 gram packet Take 1 packet (17 g total) by mouth daily as needed.   ? QUEtiapine (SEROQUEL) 25 MG tablet Take 0.5 tablets (12.5 mg total) by mouth every 6 (six) hours as needed (Agitation that is not redirectable.).   ? QUEtiapine (SEROQUEL) 25 MG tablet Take 0.5 tablets (12.5 mg total) by mouth 3 (three) times a day.   ? senna-docusate (PERICOLACE) 8.6-50 mg tablet Take 1 tablet by mouth daily.       Labs: Hospital labs are as follows; vitamin B12 was greater than 2000, TSH was 0.87, CRP was 0.6, MRI of the lumbar spine was done secondary to spinal stenosis.      Assessment:    ICD-10-CM    1. Metabolic encephalopathy  G93.41     2. Shuffling gait  R26.89    3. Urinary retention  R33.9    4. Spinal stenosis, unspecified spinal region  M48.00    5. Vitamin B12 deficiency  E53.8        Plan: Plan at this time will check orthostatic blood pressures at this time and continue with physical and Occupational Therapy.  Speech therapy would evaluate for cognition at this time and will keep the Camargo catheter in and plan is to get her back to see urologist.  She will also see outpatient neurology and likely some of her symptoms may be possibly from the spinal stenosis or they could be from orthostatic hypotension.  I will continue to monitor above medical problems and no other changes to care plan at this time.        Electronically signed by: Varinder Hale,

## 2021-06-20 NOTE — LETTER
Letter by Monik Becerra CNP at      Author: Monik Becerra CNP Service: -- Author Type: --    Filed:  Encounter Date: 7/23/2020 Status: (Other)         Patient: Alice Lawson   MR Number: [id number]   YOB: 1940   Date of Visit: 7/23/2020     Code Status:  FULL CODE  Visit Type: Problem Visit     Facility:  [facility] [[id number]]             History of Present Illness: Alice Lawson is a 80 y.o. female who I am seeing today for discharge from the TCU, secondary to neurological symptoms.. Pt recently hospitalized on 6/18/2020.  Hypertension, vitamin B12 deficiency, urinary incontinence, pulmonary nodule, generalized weakness, failure to thrive and physical deconditioning.  Patient hospitalized with metabolic encephalopathy.  Patient treated for UTI secondary to her acute delirium.  She did complete a 5-day course of Cefdinir. However urine culture on 6/18/2020 was negative.  Post antibiotic treatment her delirium  did clear somewhat. However patient presented with some urinary retention. She had elevated post void residuals.  She was started on a course of tamsulosin initially.  However however after consult with urology a Camargo catheter was placed.  Is recommended for a voiding trial in 1 to 2 weeks at the TCU. Patient with underlying spinal stenosis. There was some question of progression of her chronic disease due to his shuffling gait and new onset of urinary retention.  Patient was evaluated for cauda equina syndrome.  Patient underwent a CT of the spine on 6/22 which demonstrated several multilevel spinal stenosis and degenerative disease.  Orthopedics was consulted who ordered a follow-up with MRI.  MRI was negative.  On 6/21 patient became very agitated and combative.  Hospital associated delirium worsened and patient required one-on-one over the weekend.  Psychiatry was consulted and Seroquel was added.  Patient observed to have visual hallucinations and  neurocognitive symptoms concerning for the possibility of Parkinson's or other neurocognitive disorder such as Lewy body.  However a MRI of the head and brain on 6/22 did not demonstrate any acute abnormalities.  She has a follow-up with neurology clinic on July 8.      Today patient sitting up in bedside chair.  Patient recently seen by neurology and diagnosed with Parkinson's.  She was placed on Sinemet with then gradual increase.  She is currently on Sinemet 25/100 two times daily.  She is to increase to 3 times daily on 7/25/2020.  She initially had some dizziness however this appears to be improving.  She has been told to move slowly when changing positions.  She was also previously on Flomax starting that initially at the hospital however recently discontinued this secondary to some orthostatic hypotension.  Metoprolol was also decreased to 12.5 mg at bedtime.  Blood pressures have improved.  No further orthostasis.  Patient denies any urinary retention.  Patient does have some underlying cognitive impairments.  She initially admitted on Seroquel however this was discontinued.  No further hallucinations.  Some improvement in cognition noted.  Gait has improved with the use of medication.  Less shuffled.  Mild cogwheel rigidity.  Occasional tremor.  Stooped posturing improved however she does continue with some spatial awareness difficulty with ambulation.        Active Ambulatory Problems     Diagnosis Date Noted   ? S/P total knee arthroplasty 01/05/2017   ? Essential hypertension with goal blood pressure less than 130/80    ? Acute blood loss as cause of postoperative anemia    ? Hyperlipidemia, unspecified hyperlipidemia type    ? Acquired hammer toe of left foot 01/18/2020   ? Arthritis of left knee 01/18/2020   ? Artificial knee joint present 01/18/2020   ? Benign neoplasm 01/18/2020   ? Cerebrovascular disease 01/18/2020   ? 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 Dizziness 01/18/2020   ? 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 Solitary pulmonary nodule 01/18/2020   ? Aortic valve insufficiency 06/17/2020   ? Weakness 06/18/2020   ? Vitamin B12 deficiency (non anemic) 06/18/2020   ? Generalized muscle weakness 06/18/2020   ? Acute cystitis without hematuria    ? Acute metabolic encephalopathy 06/19/2020   ? Delirium 06/21/2020   ? Complex care coordination 06/21/2020   ? Spinal stenosis of lumbar region without neurogenic claudication 06/21/2020   ? Cognitive and behavioral changes    ? Sleep difficulties    ? Stress reaction with psychomotor agitation      Resolved Ambulatory Problems     Diagnosis Date Noted   ? ACP (advance care planning) 06/18/2020     Past Medical History:   Diagnosis Date   ? Arthritis    ? Cancer (H)    ? Hypertension    ? Melanoma (H)      Current Outpatient Medications   Medication Sig   ? acetaminophen (TYLENOL) 325 MG tablet Take 2 tablets (650 mg total) by mouth every 4 (four) hours as needed for pain or fever.   ? carbidopa-levodopa (SINEMET)  mg per tablet Take 1 tablet by mouth 2 (two) times a day.    ? cyanocobalamin (VITAMIN B-12) 1000 MCG tablet Take 1,000 mcg by mouth daily.   ? fish oil-omega-3 fatty acids (FISH OIL) 300-1,000 mg capsule Take 1 g by mouth daily.   ? lidocaine 4 % patch Place 2 patches on the skin daily. Remove and discard patch with 12 hours or as directed by MD.   ? melatonin 3 mg Tab tablet Take 1 tablet (3 mg total) by mouth at bedtime as needed.   ? metoprolol succinate (TOPROL-XL) 25 MG Take 12.5 mg by mouth at bedtime.    ? polyethylene glycol (MIRALAX) 17 gram packet Take 1 packet (17 g total) by mouth daily as needed.   ? senna-docusate (PERICOLACE) 8.6-50 mg tablet Take 1 tablet by mouth daily.       Allergies   Allergen Reactions   ? Hydrochlorothiazide Other (See Comments)     Foggy feeling (electrolyte imbalance and ended up in hospital)   ? Rosuvastatin      Other reaction(s): *Unknown, caused abd wall pain          Review of Systems  No fevers or chills. No headache, lightheadedness. Occasional dizziness. No SOB, chest pains or palpitations. Appetite is good. No nausea, vomiting, constipation or diarrhea.  Denies any dysuria frequency burning or pain on today's exam. Hx of Urinary retention. Denies any pain in her legs. Otherwise review of systems are negative.     Physical Exam  PHYSICAL EXAMINATION:  General: Awake, Alert, oriented x3, appropriately, follows simple commands, conversant  VS: /70, pulse 81, respirations 16, temperature 97.9, O2 sat 100% on RA. W 173.4 lbs  HEENT:PERRLA, Pink conjunctiva, anicteric sclerae, moist oral mucosa. Facial symmetry. Tongue midline.   NECK: Supple, without any lymphadenopathy, or masses  CVS:  S1  S2, without murmur or gallop.   LUNG: Clear to auscultation, No wheezes, rales or rhonci.  BACK: No kyphosis of the thoracic spine  ABDOMEN: Soft, nontender to palpation, with positive bowel sounds  EXTREMITIES: Moves both upper and lower extremities with generalized weakness, trace pedal edema, no calf tenderness.  Patient did ambulate to doorway and back.  Less shuffling gait noted on today's visit.  She does continue with some spatial awareness with doorways.  Issues  SKIN: Warm and dry, no rashes or erythema noted  NEUROLOGIC:  pulses palpable. Equal . Mild cogwheel rigidity of the upper extremities.  No tremor noted today.  PSYCHIATRIC: Mild cognitive impairment noted.      Labs:   Lab Results   Component Value Date    WBC 5.9 07/16/2020    HGB 12.7 07/16/2020    HCT 38.9 07/16/2020    MCV 96 07/16/2020     07/16/2020     Results for orders placed or performed in visit on 07/16/20   Basic Metabolic Panel   Result Value Ref Range    Sodium 139 136 - 145 mmol/L    Potassium 4.4 3.5 - 5.0 mmol/L    Chloride 105 98 - 107 mmol/L    CO2 26 22 - 31 mmol/L    Anion Gap, Calculation 8 5 - 18 mmol/L    Glucose 100 70 - 125 mg/dL    Calcium 9.1 8.5 - 10.5 mg/dL    BUN 9 8  - 28 mg/dL    Creatinine 0.85 0.60 - 1.10 mg/dL    GFR MDRD Af Amer >60 >60 mL/min/1.73m2    GFR MDRD Non Af Amer >60 >60 mL/min/1.73m2           Assessment/Plan:  1. Metabolic encephalopathy  Pt continues with intermittent confusion. She has been weaned off Seroquel.   Sundowning improving.     2.  Orthostatic hypotension BP improved with pushing of fluids. Suspect some dehydration.   Flomax discontinued.   Metoprolol decreased to 12.5 mg.     3. Acute cystitis with hematuria  Pt has completed her Cipro.  Camargo catheter removed.  Hx of urinary retention previously on Flomax.This was discontinued due toorthostatic hypotension. Monitor for increased symptoms or retention.    4.   Parkinson's Recently started on Sinemet, titrated up to three times a day on 7/25/20.   Continue to monitor for dizziness. Remind to change positioning slowly.     Follow up with neurology in 2 weeks.    5. Spinal stenosis, unspecified spinal region   continues on lidocaine patch and Tylenol for pain.   6. Vitamin B12 deficiency   continues on supplement.     [place] to [place] home with current meds and treatment.  Home PT, OT, home health aide and RN for medication management.  We are recommending 24-hour care.  Family plans to explore assisted living.  Follow-up with primary care provider in 1 week.  Follow-up with neurology in 2 weeks.    DISCHARGE PLAN/FACE TO FACE:  I certify that this patient is under my care and that I, or a nurse practitioner or physician's assistant working with me, had a face-to-face encounter that meets the physician face-to-face encounter requirements with this patient.        I certify that, based on my findings, the following services are medically necessary home health services.    My clinical findings support the need for the above skilled services.    This patient is homebound because: recent acute delirium, newly diagnosed parkinson and cognitive impairment.     The patient is, or has been, under my care and I  have initiated the establishment of the plan of care. This patient will be followed by a physician who will periodically review the plan of care.    .  Total time spent for this visit was 35 minutes with greater than 50% of time spent face-to-face with patient reviewing medications, reviewing home care and follow-ups    Electronically signed by: Monik Becerra CNP

## 2021-06-20 NOTE — LETTER
Letter by Monik Becerra CNP at      Author: Monik Becerra CNP Service: -- Author Type: --    Filed:  Encounter Date: 7/14/2020 Status: (Other)         Patient: Alice Lawson   MR Number: [id number]   YOB: 1940   Date of Visit: 7/14/2020     Code Status:  FULL CODE  Visit Type: Problem Visit     Facility:  [facility] [[id number]]             History of Present Illness: Alice Lawson is a 80 y.o. female who I am seeing today for follow up on the TCU, secondary to neurological symptoms.. Pt recently hospitalized on 6/18/2020.  Hypertension, vitamin B12 deficiency, urinary incontinence, pulmonary nodule, generalized weakness, failure to thrive and physical deconditioning.  Patient hospitalized with metabolic encephalopathy.  Patient treated for UTI secondary to her acute delirium.  She did complete a 5-day course of Cefdinir. However urine culture on 6/18/2020 was negative.  Post antibiotic treatment her delirium  did clear somewhat. However patient presented with some urinary retention.  He had elevated post void residuals.  She was started on a course of tamsulosin initially.  However however after consult with urology a Camargo catheter was placed.  Is recommended for a voiding trial in 1 to 2 weeks at the TCU. Patient with underlying spinal stenosis. There was some question of progression of her chronic disease due to his shuffling gait and new onset of urinary retention.  Patient was evaluated for cauda equina syndrome.  Patient underwent a CT of the spine on 6/22 which demonstrated several multilevel spinal stenosis and degenerative disease.  Orthopedics was consulted who ordered a follow-up with MRI.  MRI was negative.  On 6/21 patient became very agitated and combative.  Hospital associated delirium worsened and patient required one-on-one over the weekend.  Psychiatry was consulted and Seroquel was added.  Patient observed to have visual hallucinations and  neurocognitive symptoms concerning for the possibility of Parkinson's or other neurocognitive disorder such as Lewy body.  However a MRI of the head and brain on 6/22 did not demonstrate any acute abnormalities.  She has a follow-up with neurology clinic on July 8.      Today patient lying in bed.  Today patient had follow-up with neurology.  She was started on Sinemet.  She was started low-dose and taper upward.  Patient with classic presentation of Parkinson symptoms including mild cogwheel rigidity, shuffling gait with spatial awareness difficulty, stooped posture normal walking and occasional tremor.  Patient continues with some episodes of confusion.  Today she is very short on exam.  Patient does not want to be bothered at this time.  Requesting to nap.  She has no recollection of who I am.  She has been weaned off her Seroquel.  She had some complaints of burning with urination over the weekend.  A UA UC was obtained which was negative.  Post void residual also obtained which was less than 4 cc.  She does continue on Flomax.      Active Ambulatory Problems     Diagnosis Date Noted   ? S/P total knee arthroplasty 01/05/2017   ? Essential hypertension with goal blood pressure less than 130/80    ? Acute blood loss as cause of postoperative anemia    ? Hyperlipidemia, unspecified hyperlipidemia type    ? Acquired hammer toe of left foot 01/18/2020   ? Arthritis of left knee 01/18/2020   ? Artificial knee joint present 01/18/2020   ? Benign neoplasm 01/18/2020   ? Cerebrovascular disease 01/18/2020   ? 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 Dizziness 01/18/2020   ? 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 Solitary pulmonary nodule 01/18/2020   ? Aortic valve insufficiency 06/17/2020   ? Weakness 06/18/2020   ? ACP (advance care planning) 06/18/2020   ? Vitamin B12 deficiency (non anemic) 06/18/2020   ? Generalized muscle weakness 06/18/2020   ? Acute cystitis without hematuria    ?  Acute metabolic encephalopathy 06/19/2020   ? Delirium 06/21/2020   ? Complex care coordination 06/21/2020   ? Spinal stenosis of lumbar region without neurogenic claudication 06/21/2020   ? Cognitive and behavioral changes    ? Sleep difficulties    ? Stress reaction with psychomotor agitation      Resolved Ambulatory Problems     Diagnosis Date Noted   ? No Resolved Ambulatory Problems     Past Medical History:   Diagnosis Date   ? Arthritis    ? Cancer (H)    ? Hypertension    ? Melanoma (H)      Current Outpatient Medications   Medication Sig   ? carbidopa-levodopa (SINEMET)  mg per tablet Take 1 tablet by mouth 3 (three) times a day.   ? acetaminophen (TYLENOL) 325 MG tablet Take 2 tablets (650 mg total) by mouth every 4 (four) hours as needed for pain or fever.   ? cyanocobalamin (VITAMIN B-12) 1000 MCG tablet Take 1,000 mcg by mouth daily.   ? fish oil-omega-3 fatty acids (FISH OIL) 300-1,000 mg capsule Take 1 g by mouth daily.   ? lidocaine 4 % patch Place 2 patches on the skin daily. Remove and discard patch with 12 hours or as directed by MD.   ? melatonin 3 mg Tab tablet Take 1 tablet (3 mg total) by mouth at bedtime as needed.   ? metoprolol succinate (TOPROL-XL) 25 MG Take 25 mg by mouth at bedtime.    ? polyethylene glycol (MIRALAX) 17 gram packet Take 1 packet (17 g total) by mouth daily as needed.   ? senna-docusate (PERICOLACE) 8.6-50 mg tablet Take 1 tablet by mouth daily.   ? tamsulosin (FLOMAX) 0.4 mg cap Take 0.4 mg by mouth.       Allergies   Allergen Reactions   ? Hydrochlorothiazide Other (See Comments)     Foggy feeling (electrolyte imbalance and ended up in hospital)   ? Rosuvastatin      Other reaction(s): *Unknown, caused abd wall pain         Review of Systems  No fevers or chills. No headache, lightheadedness or dizziness. No SOB, chest pains or palpitations. Appetite is good. No nausea, vomiting, constipation or diarrhea.  Denies any dysuria frequency burning or pain on today's  exam. Hx of Urinary retention. Denies any pain in her legs. Otherwise review of systems are negative.     Physical Exam  PHYSICAL EXAMINATION:  General: Awake, Alert, oriented x3, appropriately, follows simple commands, conversant  VS: /70, P 74, R 16, T 97.9, O2 sat 95% on RA. W 169.4 lbs  HEENT:PERRLA, Pink conjunctiva, anicteric sclerae, moist oral mucosa. Facial symmetry. Tongue midline.   NECK: Supple, without any lymphadenopathy, or masses  CVS:  S1  S2, without murmur or gallop.   LUNG: Clear to auscultation, No wheezes, rales or rhonci.  BACK: No kyphosis of the thoracic spine  ABDOMEN: Soft, nontender to palpation, with positive bowel sounds  EXTREMITIES: Moves both upper and lower extremities with generalized weakness, trace pedal edema, no calf tenderness  SKIN: Warm and dry, no rashes or erythema noted  NEUROLOGIC:  pulses palpable. Equal . Mild cogwheel rigidity of the upper extremities.   PSYCHIATRIC: Mild cognitive impairment noted.Pt unable to recall who I am today. Pt very short on exam.       Labs:   Lab Results   Component Value Date    WBC 7.3 07/02/2020    HGB 11.7 (L) 07/02/2020    HCT 37.2 07/02/2020    MCV 98 07/02/2020     07/02/2020     Results for orders placed or performed in visit on 07/02/20   Basic Metabolic Panel   Result Value Ref Range    Sodium 140 136 - 145 mmol/L    Potassium 4.2 3.5 - 5.0 mmol/L    Chloride 106 98 - 107 mmol/L    CO2 27 22 - 31 mmol/L    Anion Gap, Calculation 7 5 - 18 mmol/L    Glucose 88 70 - 125 mg/dL    Calcium 9.4 8.5 - 10.5 mg/dL    BUN 18 8 - 28 mg/dL    Creatinine 0.84 0.60 - 1.10 mg/dL    GFR MDRD Af Amer >60 >60 mL/min/1.73m2    GFR MDRD Non Af Amer >60 >60 mL/min/1.73m2           Assessment/Plan:  1. Metabolic encephalopathy  Pt continues with intermittent confusion. She has been weaned off Seroquel.   Sundowning improving.     2. Acute cystitis with hematuria  Pt has completed her Cipro.  Camargo catheter removed.  Hx of urinary  retention on Flomax.    3. Shuffling gait  Pt seen by neurology today and started on Sinemet with gradual dose increase.     5. Spinal stenosis, unspecified spinal region   continues on lidocaine patch and Tylenol for pain.   6. Vitamin B12 deficiency   continues on supplement.       Electronically signed by: Monik Becerra, CNP

## 2021-06-20 NOTE — LETTER
Letter by Monik Becerra CNP at      Author: Monik Becerra CNP Service: -- Author Type: --    Filed:  Encounter Date: 7/16/2020 Status: (Other)         Patient: Alice Lawson   MR Number: [id number]   YOB: 1940   Date of Visit: 7/16/2020     Code Status:  FULL CODE  Visit Type: Problem Visit     Facility:  [facility] [[id number]]             History of Present Illness: Alice Lawson is a 80 y.o. female who I am seeing today for follow up on the TCU, secondary to neurological symptoms.. Pt recently hospitalized on 6/18/2020.  Hypertension, vitamin B12 deficiency, urinary incontinence, pulmonary nodule, generalized weakness, failure to thrive and physical deconditioning.  Patient hospitalized with metabolic encephalopathy.  Patient treated for UTI secondary to her acute delirium.  She did complete a 5-day course of Cefdinir. However urine culture on 6/18/2020 was negative.  Post antibiotic treatment her delirium  did clear somewhat. However patient presented with some urinary retention. She had elevated post void residuals.  She was started on a course of tamsulosin initially.  However however after consult with urology a Camargo catheter was placed.  Is recommended for a voiding trial in 1 to 2 weeks at the TCU. Patient with underlying spinal stenosis. There was some question of progression of her chronic disease due to his shuffling gait and new onset of urinary retention.  Patient was evaluated for cauda equina syndrome.  Patient underwent a CT of the spine on 6/22 which demonstrated several multilevel spinal stenosis and degenerative disease.  Orthopedics was consulted who ordered a follow-up with MRI.  MRI was negative.  On 6/21 patient became very agitated and combative.  Hospital associated delirium worsened and patient required one-on-one over the weekend.  Psychiatry was consulted and Seroquel was added.  Patient observed to have visual hallucinations and  neurocognitive symptoms concerning for the possibility of Parkinson's or other neurocognitive disorder such as Lewy body.  However a MRI of the head and brain on 6/22 did not demonstrate any acute abnormalities.  She has a follow-up with neurology clinic on July 8.      Today patient was ambulating in the hallway with therapy and she became very unstable with altered gait.  She was assisted to a seated position.  Blood pressure at the time was 104/69 with a heart rate of 68 sitting.  I did have her stand.  Blood pressure was 71/49 with a heart rate of 91.  Patient reports feeling dizzy.  Patient has a history of hypertension.  She continues to have metoprolol 25 mg at bedtime.  During hospitalization her losartan was discontinued secondary to low BP.  She was recently started on Flomax for urinary retention.  This could be contributing to some orthostasis as well as recent addition of Sinemet for Parkinson's .  Fluids were encouraged.  And blood pressure came up to 145/78 sitting and standing 128/64.  Patient has been weaned off Seroquel.  Patient is voiding adequately.  Recent follow-up UA UC was negative.  Patient continues with some episodes of confusion.      Active Ambulatory Problems     Diagnosis Date Noted   ? S/P total knee arthroplasty 01/05/2017   ? Essential hypertension with goal blood pressure less than 130/80    ? Acute blood loss as cause of postoperative anemia    ? Hyperlipidemia, unspecified hyperlipidemia type    ? Acquired hammer toe of left foot 01/18/2020   ? Arthritis of left knee 01/18/2020   ? Artificial knee joint present 01/18/2020   ? Benign neoplasm 01/18/2020   ? Cerebrovascular disease 01/18/2020   ? 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 Dizziness 01/18/2020   ? 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 Solitary pulmonary nodule 01/18/2020   ? Aortic valve insufficiency 06/17/2020   ? Weakness 06/18/2020   ? Vitamin B12 deficiency (non anemic) 06/18/2020    ? Generalized muscle weakness 06/18/2020   ? Acute cystitis without hematuria    ? Acute metabolic encephalopathy 06/19/2020   ? Delirium 06/21/2020   ? Complex care coordination 06/21/2020   ? Spinal stenosis of lumbar region without neurogenic claudication 06/21/2020   ? Cognitive and behavioral changes    ? Sleep difficulties    ? Stress reaction with psychomotor agitation      Resolved Ambulatory Problems     Diagnosis Date Noted   ? ACP (advance care planning) 06/18/2020     Past Medical History:   Diagnosis Date   ? Arthritis    ? Cancer (H)    ? Hypertension    ? Melanoma (H)      Current Outpatient Medications   Medication Sig   ? acetaminophen (TYLENOL) 325 MG tablet Take 2 tablets (650 mg total) by mouth every 4 (four) hours as needed for pain or fever.   ? carbidopa-levodopa (SINEMET)  mg per tablet Take 1 tablet by mouth 3 (three) times a day.   ? cyanocobalamin (VITAMIN B-12) 1000 MCG tablet Take 1,000 mcg by mouth daily.   ? fish oil-omega-3 fatty acids (FISH OIL) 300-1,000 mg capsule Take 1 g by mouth daily.   ? lidocaine 4 % patch Place 2 patches on the skin daily. Remove and discard patch with 12 hours or as directed by MD.   ? melatonin 3 mg Tab tablet Take 1 tablet (3 mg total) by mouth at bedtime as needed.   ? metoprolol succinate (TOPROL-XL) 25 MG Take 25 mg by mouth at bedtime.    ? polyethylene glycol (MIRALAX) 17 gram packet Take 1 packet (17 g total) by mouth daily as needed.   ? senna-docusate (PERICOLACE) 8.6-50 mg tablet Take 1 tablet by mouth daily.   ? tamsulosin (FLOMAX) 0.4 mg cap Take 0.4 mg by mouth.       Allergies   Allergen Reactions   ? Hydrochlorothiazide Other (See Comments)     Foggy feeling (electrolyte imbalance and ended up in hospital)   ? Rosuvastatin      Other reaction(s): *Unknown, caused abd wall pain         Review of Systems  No fevers or chills. No headache, lightheadedness or dizziness. No SOB, chest pains or palpitations. Appetite is good. No nausea,  vomiting, constipation or diarrhea.  Denies any dysuria frequency burning or pain on today's exam. Hx of Urinary retention. Denies any pain in her legs. Otherwise review of systems are negative.     Physical Exam  PHYSICAL EXAMINATION:  General: Awake, Alert, oriented x3, appropriately, follows simple commands, conversant  VS: /70, pulse 81, respirations 16, temperature 97.7, O2 sat 97% on RA. W 178.8 lbs  HEENT:PERRLA, Pink conjunctiva, anicteric sclerae, moist oral mucosa. Facial symmetry. Tongue midline.   NECK: Supple, without any lymphadenopathy, or masses  CVS:  S1  S2, without murmur or gallop.   LUNG: Clear to auscultation, No wheezes, rales or rhonci.  BACK: No kyphosis of the thoracic spine  ABDOMEN: Soft, nontender to palpation, with positive bowel sounds  EXTREMITIES: Moves both upper and lower extremities with generalized weakness, trace pedal edema, no calf tenderness  SKIN: Warm and dry, no rashes or erythema noted  NEUROLOGIC:  pulses palpable. Equal . Mild cogwheel rigidity of the upper extremities.   PSYCHIATRIC: Mild cognitive impairment noted.      Labs:   Lab Results   Component Value Date    WBC 5.9 07/16/2020    HGB 12.7 07/16/2020    HCT 38.9 07/16/2020    MCV 96 07/16/2020     07/16/2020     Results for orders placed or performed in visit on 07/16/20   Basic Metabolic Panel   Result Value Ref Range    Sodium 139 136 - 145 mmol/L    Potassium 4.4 3.5 - 5.0 mmol/L    Chloride 105 98 - 107 mmol/L    CO2 26 22 - 31 mmol/L    Anion Gap, Calculation 8 5 - 18 mmol/L    Glucose 100 70 - 125 mg/dL    Calcium 9.1 8.5 - 10.5 mg/dL    BUN 9 8 - 28 mg/dL    Creatinine 0.85 0.60 - 1.10 mg/dL    GFR MDRD Af Amer >60 >60 mL/min/1.73m2    GFR MDRD Non Af Amer >60 >60 mL/min/1.73m2           Assessment/Plan:  1. Metabolic encephalopathy  Pt continues with intermittent confusion. She has been weaned off Seroquel.   Sundowning improving.     2.  Orthostatic hypotension 50 pt drop today with  standing.   Decrease Metoprolol to 12.5mg Q HS.   Losartan d/c during hospitalization.   D/c Flomax.   Stat BMP and CBC    3. Acute cystitis with hematuria  Pt has completed her Cipro.  Camargo catheter removed.  Hx of urinary retention on Flomax. I will d/c this due to orthostatic hypotension. Monitor for increased symptoms or retention.    4. Shuffling gait  Recently started on Sinemet.      5. Spinal stenosis, unspecified spinal region   continues on lidocaine patch and Tylenol for pain.   6. Vitamin B12 deficiency   continues on supplement.       Electronically signed by: Monik Becerra, CNP             negative Affect and characteristics of appearance, verbalizations, behaviors are appropriate

## 2021-06-20 NOTE — LETTER
Letter by Monik Becerra CNP at      Author: Monik Becerra CNP Service: -- Author Type: --    Filed:  Encounter Date: 7/2/2020 Status: (Other)         Patient: Alice Lawson   MR Number: [id number]   YOB: 1940   Date of Visit: 7/2/2020     Code Status:  FULL CODE  Visit Type: Problem Visit     Facility:  [facility] [[id number]]             History of Present Illness: Alice Lawson is a 80 y.o. female who I am seeing today for follow up on the TCU. Pt recently hospitalized on 6/18/2020.  Hypertension, vitamin B12 deficiency, urinary incontinence, pulmonary nodule, generalized weakness, failure to thrive and physical deconditioning.  Patient hospitalized with metabolic encephalopathy.  Patient treated for UTI secondary to her acute delirium.  She did complete a 5-day course of Cefdinir. However urine culture on 6/18/2020 was negative.  Post antibiotic treatment her delirium  did clear somewhat. However patient presented with some urinary retention.  He had elevated post void residuals.  She was started on a course of tamsulosin initially.  However however after consult with urology a Camargo catheter was placed.  Is recommended for a voiding trial in 1 to 2 weeks at the TCU. Patient with underlying spinal stenosis. There was some question of progression of her chronic disease due to his shuffling gait and new onset of urinary retention.  Patient was evaluated for cauda equina syndrome.  Patient underwent a CT of the spine on 6/22 which demonstrated several multilevel spinal stenosis and degenerative disease.  Orthopedics was consulted who ordered a follow-up with MRI.  MRI was negative.  On 6/21 patient became very agitated and combative.  Hospital associated delirium worsened and patient required one-on-one over the weekend.  Psychiatry was consulted and Seroquel was added.  Patient observed to have visual hallucinations and neurocognitive symptoms concerning for  the possibility of Parkinson's or other neurocognitive disorder such as Lewy body.  However a MRI of the head and brain on 6/22 did not demonstrate any acute abnormalities.  She has a follow-up with neurology clinic on July 8.      Today patient sitting up in bedside chair. Pt cognition improving. She was started on Seroquel in the hospital due to hallucinations and delirium. No further hallucinations. I will attempt to wean off. No cognitive impairment at baseline per her daughter. Pt continues with ansari catheter. She recently grew pseudomonas in her urine. She was treated for UTI during hospitalization however culture showed no growth. She was given 1 dose of IM Rocephin and transitioned onto oral Cipro. Catheter was changed. She is having some occasional tremors to the upper extremities. Shuffling gait on exam with assisting pt to ambulate ~20 feet with walker. Some spatial awareness issues as well. She also presents with mild cogwheel rigidity of the UE. I did talk at length with her daughter. She has been followed by Dr. Shepherd at the [facility] since 2018 with tremors first began. I did explain clinical correlation with parkinson's as seen on exam. She has a follow up with neurology on 7/14. I will reach out to the Dr. Shepherd. Pt also has underlying spinal stenosis. She continues on tylenol and lidocaine patch for pain.       Active Ambulatory Problems     Diagnosis Date Noted   ? S/P total knee arthroplasty 01/05/2017   ? Essential hypertension with goal blood pressure less than 130/80    ? Acute blood loss as cause of postoperative anemia    ? Hyperlipidemia, unspecified hyperlipidemia type    ? Acquired hammer toe of left foot 01/18/2020   ? Arthritis of left knee 01/18/2020   ? Artificial knee joint present 01/18/2020   ? Benign neoplasm 01/18/2020   ? Cerebrovascular disease 01/18/2020   ? 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 Dizziness 01/18/2020   ? 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 Solitary pulmonary nodule 01/18/2020   ? Aortic valve insufficiency 06/17/2020   ? Weakness 06/18/2020   ? ACP (advance care planning) 06/18/2020   ? Vitamin B12 deficiency (non anemic) 06/18/2020   ? Generalized muscle weakness 06/18/2020   ? Acute cystitis without hematuria    ? Acute metabolic encephalopathy 06/19/2020   ? Delirium 06/21/2020   ? Complex care coordination 06/21/2020   ? Spinal stenosis of lumbar region without neurogenic claudication 06/21/2020   ? Cognitive and behavioral changes    ? Sleep difficulties    ? Stress reaction with psychomotor agitation      Resolved Ambulatory Problems     Diagnosis Date Noted   ? No Resolved Ambulatory Problems     Past Medical History:   Diagnosis Date   ? Arthritis    ? Cancer (H)    ? Hypertension    ? Melanoma (H)      Current Outpatient Medications   Medication Sig   ? ciprofloxacin HCl (CIPRO) 250 MG tablet Take 250 mg by mouth 2 (two) times a day.   ? acetaminophen (TYLENOL) 325 MG tablet Take 2 tablets (650 mg total) by mouth every 4 (four) hours as needed for pain or fever.   ? cyanocobalamin (VITAMIN B-12) 1000 MCG tablet Take 1,000 mcg by mouth daily.   ? fish oil-omega-3 fatty acids (FISH OIL) 300-1,000 mg capsule Take 1 g by mouth daily.   ? lidocaine 4 % patch Place 2 patches on the skin daily. Remove and discard patch with 12 hours or as directed by MD.   ? melatonin 3 mg Tab tablet Take 1 tablet (3 mg total) by mouth at bedtime as needed.   ? metoprolol succinate (TOPROL-XL) 25 MG Take 25 mg by mouth at bedtime.    ? polyethylene glycol (MIRALAX) 17 gram packet Take 1 packet (17 g total) by mouth daily as needed.   ? QUEtiapine (SEROQUEL) 25 MG tablet Take 0.5 tablets (12.5 mg total) by mouth 3 (three) times a day. (Patient taking differently: Take 12.5 mg by mouth 2 (two) times a day. )   ? senna-docusate (PERICOLACE) 8.6-50 mg tablet Take 1 tablet by mouth daily.       Allergies   Allergen Reactions   ? Hydrochlorothiazide Other  (See Comments)     Foggy feeling (electrolyte imbalance and ended up in hospital)   ? Rosuvastatin      Other reaction(s): *Unknown, caused abd wall pain     Past medical history includes    Review of Systems  No fevers or chills. No headache, lightheadedness or dizziness.+ hallucinations.  No SOB, chest pains or palpitations. Appetite is good. No nausea, vomiting, constipation or diarrhea. Continues with ansari catheter. Hx of Urinary retention.  Otherwise review of systems are negative.     Physical Exam  PHYSICAL EXAMINATION:  General: Awake, Alert, oriented x3, appropriately, follows simple commands, conversant  VS: /70, P 72,  R 16, T 98.4, O2 sat 96% on RA. W 170.4 lbs  HEENT:PERRLA, Pink conjunctiva, anicteric sclerae, moist oral mucosa. Facial symmetry. Tongue midline.   NECK: Supple, without any lymphadenopathy, or masses  CVS:  S1  S2, without murmur or gallop.   LUNG: Clear to auscultation, No wheezes, rales or rhonci.  BACK: No kyphosis of the thoracic spine  ABDOMEN: Soft, nontender to palpation, with positive bowel sounds  EXTREMITIES: Moves both upper and lower extremities with generalized weakness, trace pedal edema, no calf tenderness  SKIN: Warm and dry, no rashes or erythema noted  NEUROLOGIC: Intact, pulses palpable  PSYCHIATRIC: Mild cognitive impairment noted. Poor word recall at times.       Labs:   Lab Results   Component Value Date    WBC 7.3 07/02/2020    HGB 11.7 (L) 07/02/2020    HCT 37.2 07/02/2020    MCV 98 07/02/2020     07/02/2020     Results for orders placed or performed in visit on 07/02/20   Basic Metabolic Panel   Result Value Ref Range    Sodium 140 136 - 145 mmol/L    Potassium 4.2 3.5 - 5.0 mmol/L    Chloride 106 98 - 107 mmol/L    CO2 27 22 - 31 mmol/L    Anion Gap, Calculation 7 5 - 18 mmol/L    Glucose 88 70 - 125 mg/dL    Calcium 9.4 8.5 - 10.5 mg/dL    BUN 18 8 - 28 mg/dL    Creatinine 0.84 0.60 - 1.10 mg/dL    GFR MDRD Af Amer >60 >60 mL/min/1.73m2    GFR  MDRD Non Af Amer >60 >60 mL/min/1.73m2           Assessment/Plan:  1. Metabolic encephalopathy  Hallucinations resolved. Decrease Seroquel to 12.5 mg two times a day. Goal is to wean off. D/c prn dose.    2. Acute cystitis with hematuria   patient continues with Camargo catheter.  Urine culture grew pseudomonas. She was treated with IM Rocephin and transitioned onto oral Cipro.   Catheter changed.  D/c Catheter on Monday and initiate bladder scan protocol.    3. Urinary retention   continues with Camargo catheter.  We will attempt to DC once UTI is treated.   4. Shuffling gait    Questionable Parkinson's. Also correlating symptoms of mild cogwheel rigidity and upper arm tremors.   She is followed by Dr. Shepherd at [facility]. Message left for call back for consult.   Pt has follow up with Neurology on 7/14.    5. Spinal stenosis, unspecified spinal region   continues on lidocaine patch and Tylenol for pain.   6. Vitamin B12 deficiency   continues on supplement.         35 minutes spent of which greater than 50% was spent face to face interviewing pt, reviewing medications, UTI treatment and parkinsons symptoms.     Additional 45 minutes spent in pt room talking to pt with daughter on speaker phone discussing parkinson symptoms, including treatment of, previous neurology consults, UTI, catheter use and removal of, cognition, medications and laboratory.     Electronically signed by: Monik Becerra CNP

## 2021-06-20 NOTE — LETTER
Letter by Monik Becerra CNP at      Author: Monik Becerra CNP Service: -- Author Type: --    Filed:  Encounter Date: 6/30/2020 Status: (Other)         Patient: Alice Lawson   MR Number: [id number]   YOB: 1940   Date of Visit: 6/30/2020     Code Status:  FULL CODE  Visit Type: Problem Visit     Facility:  [facility] [[id number]]             History of Present Illness: Alice Lawson is a 80 y.o. female who I am seeing today for follow up on the TCU. Pt recently hospitalized on 6/18/2020.  Hypertension, vitamin B12 deficiency, urinary incontinence, pulmonary nodule, generalized weakness, failure to thrive and physical deconditioning.  Patient hospitalized with metabolic encephalopathy.  Patient treated for UTI secondary to her acute delirium.  She did complete a 5-day course of Cefdinir. However urine culture on 6/18/2020 was negative.  Post antibiotic treatment her delirium  did clear somewhat. However patient presented with some urinary retention.  He had elevated post void residuals.  She was started on a course of tamsulosin initially.  However however after consult with urology a Camargo catheter was placed.  Is recommended for a voiding trial in 1 to 2 weeks at the TCU. Patient with underlying spinal stenosis. There was some question of progression of her chronic disease due to his shuffling gait and new onset of urinary retention.  Patient was evaluated for cauda equina syndrome.  Patient underwent a CT of the spine on 6/22 which demonstrated several multilevel spinal stenosis and degenerative disease.  Orthopedics was consulted who ordered a follow-up with MRI.  MRI was negative.  On 6/21 patient became very agitated and combative.  Hospital associated delirium worsened and patient required one-on-one over the weekend.  Psychiatry was consulted and Seroquel was added.  Patient observed to have visual hallucinations and neurocognitive symptoms concerning for  the possibility of Parkinson's or other neurocognitive disorder such as Lewy body.  However a MRI of the head and brain on 6/22 did not demonstrate any acute abnormalities.  She has a follow-up with neurology clinic on July 8.      Today patient sitting up in bedside chair.  Patient continues with Camargo catheter in place.  Patient had complained of some burning with attempting to urinate although she had catheter.  MD was notified.  UA UC ordered.  A urinalysis was obtained however the catheter was not changed at the time collected.  Urinalysis results today positive for cloudy urine, moderate blood, positive nitrites, large leukocytes, many bacteria,  WBCs, mucus few, WBC present.  Patient currently afebrile.  She does continue with some confusion and hallucinations per the nursing staff.  However she is answering  simple yes/no questions appropriately.  Have asked nursing staff to change her Camargo catheter and obtain a new specimen.  Blood pressure satisfactory.    Active Ambulatory Problems     Diagnosis Date Noted   ? S/P total knee arthroplasty 01/05/2017   ? Essential hypertension with goal blood pressure less than 130/80    ? Acute blood loss as cause of postoperative anemia    ? Hyperlipidemia, unspecified hyperlipidemia type    ? Acquired hammer toe of left foot 01/18/2020   ? Arthritis of left knee 01/18/2020   ? Artificial knee joint present 01/18/2020   ? Benign neoplasm 01/18/2020   ? Cerebrovascular disease 01/18/2020   ? 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 Dizziness 01/18/2020   ? 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 Solitary pulmonary nodule 01/18/2020   ? Aortic valve insufficiency 06/17/2020   ? Weakness 06/18/2020   ? ACP (advance care planning) 06/18/2020   ? Vitamin B12 deficiency (non anemic) 06/18/2020   ? Generalized muscle weakness 06/18/2020   ? Acute cystitis without hematuria    ? Acute metabolic encephalopathy 06/19/2020   ? Delirium 06/21/2020    ? Complex care coordination 06/21/2020   ? Spinal stenosis of lumbar region without neurogenic claudication 06/21/2020   ? Cognitive and behavioral changes    ? Sleep difficulties    ? Stress reaction with psychomotor agitation      Resolved Ambulatory Problems     Diagnosis Date Noted   ? No Resolved Ambulatory Problems     Past Medical History:   Diagnosis Date   ? Arthritis    ? Cancer (H)    ? Hypertension    ? Melanoma (H)      Current Outpatient Medications   Medication Sig   ? acetaminophen (TYLENOL) 325 MG tablet Take 2 tablets (650 mg total) by mouth every 4 (four) hours as needed for pain or fever.   ? cyanocobalamin (VITAMIN B-12) 1000 MCG tablet Take 1,000 mcg by mouth daily.   ? fish oil-omega-3 fatty acids (FISH OIL) 300-1,000 mg capsule Take 1 g by mouth daily.   ? lidocaine 4 % patch Place 2 patches on the skin daily. Remove and discard patch with 12 hours or as directed by MD.   ? melatonin 3 mg Tab tablet Take 1 tablet (3 mg total) by mouth at bedtime as needed.   ? melatonin 3 mg Tab tablet Take 2 tablets (6 mg total) by mouth at bedtime.   ? metoprolol succinate (TOPROL-XL) 25 MG Take 25 mg by mouth at bedtime.    ? polyethylene glycol (MIRALAX) 17 gram packet Take 1 packet (17 g total) by mouth daily as needed.   ? QUEtiapine (SEROQUEL) 25 MG tablet Take 0.5 tablets (12.5 mg total) by mouth every 6 (six) hours as needed (Agitation that is not redirectable.).   ? QUEtiapine (SEROQUEL) 25 MG tablet Take 0.5 tablets (12.5 mg total) by mouth 3 (three) times a day.   ? senna-docusate (PERICOLACE) 8.6-50 mg tablet Take 1 tablet by mouth daily.       Allergies   Allergen Reactions   ? Hydrochlorothiazide Other (See Comments)     Foggy feeling (electrolyte imbalance and ended up in hospital)   ? Rosuvastatin      Other reaction(s): *Unknown, caused abd wall pain     Past medical history includes    Review of Systems  No fevers or chills. No headache, lightheadedness or dizziness.+ hallucinations.  No  SOB, chest pains or palpitations. Appetite is good. No nausea, vomiting, constipation or diarrhea. Continues with ansari catheter. Hx of Urinary retention.  Otherwise review of systems are negative.     Physical Exam  PHYSICAL EXAMINATION:  General: Awake, Alert, oriented x3, appropriately, follows simple commands, conversant  VS: /70, P 72,  R 16, T 98.4, O2 sat 96% on RA. W 170.4 lbs  HEENT:PERRLA, Pink conjunctiva, anicteric sclerae, moist oral mucosa. Facial symmetry. Tongue midline.   NECK: Supple, without any lymphadenopathy, or masses  CVS:  S1  S2, without murmur or gallop.   LUNG: Clear to auscultation, No wheezes, rales or rhonci.  BACK: No kyphosis of the thoracic spine  ABDOMEN: Soft, nontender to palpation, with positive bowel sounds  EXTREMITIES: Moves both upper and lower extremities with generalized weakness, trace pedal edema, no calf tenderness  SKIN: Warm and dry, no rashes or erythema noted  NEUROLOGIC: Intact, pulses palpable  PSYCHIATRIC: Mild cognitive impairment noted. Poor word recall at times.       Labs:   Lab Results   Component Value Date    WBC 8.6 06/30/2020    HGB 11.7 (L) 06/30/2020    HCT 37.2 06/30/2020    MCV 98 06/30/2020     06/30/2020     Results for orders placed or performed in visit on 06/30/20   Basic Metabolic Panel   Result Value Ref Range    Sodium 141 136 - 145 mmol/L    Potassium 3.9 3.5 - 5.0 mmol/L    Chloride 107 98 - 107 mmol/L    CO2 26 22 - 31 mmol/L    Anion Gap, Calculation 8 5 - 18 mmol/L    Glucose 91 70 - 125 mg/dL    Calcium 9.0 8.5 - 10.5 mg/dL    BUN 16 8 - 28 mg/dL    Creatinine 0.88 0.60 - 1.10 mg/dL    GFR MDRD Af Amer >60 >60 mL/min/1.73m2    GFR MDRD Non Af Amer >60 >60 mL/min/1.73m2           Assessment/Plan:  1. Metabolic encephalopathy   patient continues with occasional hallucinations.  She is on Seroquel 25 3 times daily.  Only one-time use of her as needed dosing.  Will DC this.   2. Acute cystitis with hematuria   patient continues  with Camargo catheter.  Increased burning last evening.  UA UC was obtained.  We will need to repeat UA UC once catheter change.  However we will treat empirically for now.  Will give IM Rocephin 1 g of lidocaine x1.  Follow-up CBC and BMP on Thursday.  Encourage fluids.   3. Urinary retention   continues with Camargo catheter.  We will attempt to DC once UTI is treated.   4. Shuffling gait   continues in therapy.  Questionable Parkinson's.  If no improvement will have her follow-up with neurology.   5. Spinal stenosis, unspecified spinal region   continues on lidocaine patch and Tylenol for pain.   6. Vitamin B12 deficiency   continues on supplement.         35 minutes spent of which greater than 50% was spent face to face interviewing pt, cdiscussing cognition, previous urinary status as well as collaborating with nursing staff regarding catheter and urine specimen collected.      Electronically signed by: Monik Becerra, WAQAR

## 2021-06-20 NOTE — LETTER
Letter by Monik Becerra CNP at      Author: Monik Becerra CNP Service: -- Author Type: --    Filed:  Encounter Date: 7/21/2020 Status: (Other)         Patient: Alice Lawson   MR Number: [id number]   YOB: 1940   Date of Visit: 7/21/2020     Code Status:  FULL CODE  Visit Type: Problem Visit     Facility:  [facility] [[id number]]             History of Present Illness: Alice Lawson is a 80 y.o. female who I am seeing today for follow up on the TCU, secondary to neurological symptoms.. Pt recently hospitalized on 6/18/2020.  Hypertension, vitamin B12 deficiency, urinary incontinence, pulmonary nodule, generalized weakness, failure to thrive and physical deconditioning.  Patient hospitalized with metabolic encephalopathy.  Patient treated for UTI secondary to her acute delirium.  She did complete a 5-day course of Cefdinir. However urine culture on 6/18/2020 was negative.  Post antibiotic treatment her delirium  did clear somewhat. However patient presented with some urinary retention. She had elevated post void residuals.  She was started on a course of tamsulosin initially.  However however after consult with urology a Camargo catheter was placed.  Is recommended for a voiding trial in 1 to 2 weeks at the TCU. Patient with underlying spinal stenosis. There was some question of progression of her chronic disease due to his shuffling gait and new onset of urinary retention.  Patient was evaluated for cauda equina syndrome.  Patient underwent a CT of the spine on 6/22 which demonstrated several multilevel spinal stenosis and degenerative disease.  Orthopedics was consulted who ordered a follow-up with MRI.  MRI was negative.  On 6/21 patient became very agitated and combative.  Hospital associated delirium worsened and patient required one-on-one over the weekend.  Psychiatry was consulted and Seroquel was added.  Patient observed to have visual hallucinations and  neurocognitive symptoms concerning for the possibility of Parkinson's or other neurocognitive disorder such as Lewy body.  However a MRI of the head and brain on 6/22 did not demonstrate any acute abnormalities.  She has a follow-up with neurology clinic on July 8.      Today patient sitting up in bedside chair.  Patient with recent diagnosis of Parkinson's disease.  Recently seen by neurology and started on Sinemet.  Yesterday she started Sinemet twice daily.  Her gait has greatly improved with decreased shuffling.  She does have some spatial awareness when it comes to doorways.  Left cogwheel rigidity noted.  Patient did have some low blood pressures yesterday.  Fluids were pushed.  I did discontinue her Flomax  and her metoprolol was decreased to 12.5 mg at at bedtime.  Today blood pressures appear within normal limits. Lying bp today  Was 140/85 and standing 123/75.  Heart rate within normal limits. Patient reports dizziness improving.  Dizziness can be a side effect of Sinemet.  I did speak with her daughter on the phone in regards to her blood pressure and improvement with the use of her Sinemet.  We will need to give the medication some time to adjust in her system.  She was instructed per neurology to change positions slowly to combat some of the dizziness.  She is voiding adequately.  No signs or symptoms of urinary retention.  She does have some mild underlying cognitive impairment no further hallucinations.        Active Ambulatory Problems     Diagnosis Date Noted   ? S/P total knee arthroplasty 01/05/2017   ? Essential hypertension with goal blood pressure less than 130/80    ? Acute blood loss as cause of postoperative anemia    ? Hyperlipidemia, unspecified hyperlipidemia type    ? Acquired hammer toe of left foot 01/18/2020   ? Arthritis of left knee 01/18/2020   ? Artificial knee joint present 01/18/2020   ? Benign neoplasm 01/18/2020   ? Cerebrovascular disease 01/18/2020   ? 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 Dizziness 01/18/2020   ? 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 Solitary pulmonary nodule 01/18/2020   ? Aortic valve insufficiency 06/17/2020   ? Weakness 06/18/2020   ? Vitamin B12 deficiency (non anemic) 06/18/2020   ? Generalized muscle weakness 06/18/2020   ? Acute cystitis without hematuria    ? Acute metabolic encephalopathy 06/19/2020   ? Delirium 06/21/2020   ? Complex care coordination 06/21/2020   ? Spinal stenosis of lumbar region without neurogenic claudication 06/21/2020   ? Cognitive and behavioral changes    ? Sleep difficulties    ? Stress reaction with psychomotor agitation      Resolved Ambulatory Problems     Diagnosis Date Noted   ? ACP (advance care planning) 06/18/2020     Past Medical History:   Diagnosis Date   ? Arthritis    ? Cancer (H)    ? Hypertension    ? Melanoma (H)      Current Outpatient Medications   Medication Sig   ? acetaminophen (TYLENOL) 325 MG tablet Take 2 tablets (650 mg total) by mouth every 4 (four) hours as needed for pain or fever.   ? carbidopa-levodopa (SINEMET)  mg per tablet Take 1 tablet by mouth 2 (two) times a day.    ? cyanocobalamin (VITAMIN B-12) 1000 MCG tablet Take 1,000 mcg by mouth daily.   ? fish oil-omega-3 fatty acids (FISH OIL) 300-1,000 mg capsule Take 1 g by mouth daily.   ? lidocaine 4 % patch Place 2 patches on the skin daily. Remove and discard patch with 12 hours or as directed by MD.   ? melatonin 3 mg Tab tablet Take 1 tablet (3 mg total) by mouth at bedtime as needed.   ? metoprolol succinate (TOPROL-XL) 25 MG Take 12.5 mg by mouth at bedtime.    ? polyethylene glycol (MIRALAX) 17 gram packet Take 1 packet (17 g total) by mouth daily as needed.   ? senna-docusate (PERICOLACE) 8.6-50 mg tablet Take 1 tablet by mouth daily.       Allergies   Allergen Reactions   ? Hydrochlorothiazide Other (See Comments)     Foggy feeling (electrolyte imbalance and ended up in hospital)   ?  Rosuvastatin      Other reaction(s): *Unknown, caused abd wall pain         Review of Systems  No fevers or chills. No headache, lightheadedness. Occasional dizziness. No SOB, chest pains or palpitations. Appetite is good. No nausea, vomiting, constipation or diarrhea.  Denies any dysuria frequency burning or pain on today's exam. Hx of Urinary retention. Denies any pain in her legs. Otherwise review of systems are negative.     Physical Exam  PHYSICAL EXAMINATION:  General: Awake, Alert, oriented x3, appropriately, follows simple commands, conversant  VS: /82, pulse 91, respirations 16, temperature 97.2, O2 sat 100% on RA. W 178.8 lbs  HEENT:PERRLA, Pink conjunctiva, anicteric sclerae, moist oral mucosa. Facial symmetry. Tongue midline.   NECK: Supple, without any lymphadenopathy, or masses  CVS:  S1  S2, without murmur or gallop.   LUNG: Clear to auscultation, No wheezes, rales or rhonci.  BACK: No kyphosis of the thoracic spine  ABDOMEN: Soft, nontender to palpation, with positive bowel sounds  EXTREMITIES: Moves both upper and lower extremities with generalized weakness, trace pedal edema, no calf tenderness.  Patient did ambulate to doorway and back.  Less shuffling gait noted on today's visit.  She does continue with some spatial awareness with doorways.  Issues  SKIN: Warm and dry, no rashes or erythema noted  NEUROLOGIC:  pulses palpable. Equal . Mild cogwheel rigidity of the upper extremities.  No tremor noted today.  PSYCHIATRIC: Mild cognitive impairment noted.      Labs:   Lab Results   Component Value Date    WBC 5.9 07/16/2020    HGB 12.7 07/16/2020    HCT 38.9 07/16/2020    MCV 96 07/16/2020     07/16/2020     Results for orders placed or performed in visit on 07/16/20   Basic Metabolic Panel   Result Value Ref Range    Sodium 139 136 - 145 mmol/L    Potassium 4.4 3.5 - 5.0 mmol/L    Chloride 105 98 - 107 mmol/L    CO2 26 22 - 31 mmol/L    Anion Gap, Calculation 8 5 - 18 mmol/L     Glucose 100 70 - 125 mg/dL    Calcium 9.1 8.5 - 10.5 mg/dL    BUN 9 8 - 28 mg/dL    Creatinine 0.85 0.60 - 1.10 mg/dL    GFR MDRD Af Amer >60 >60 mL/min/1.73m2    GFR MDRD Non Af Amer >60 >60 mL/min/1.73m2           Assessment/Plan:  1. Metabolic encephalopathy  Pt continues with intermittent confusion. She has been weaned off Seroquel.   Sundowning improving.     2.  Orthostatic hypotension BP improved with pushing of fluids. Suspect some dehydration.   Flomax discontinued.   Metoprolol decreased to 12.5 mg.     3. Acute cystitis with hematuria  Pt has completed her Cipro.  Camargo catheter removed.  Hx of urinary retention on Flomax. I will d/c this due to orthostatic hypotension. Monitor for increased symptoms or retention.    4.   Parkinson's Recently started on Sinemet, titrated to two times a day yesterday.   Continue to monitor for dizziness. Remind to change positioning slowly.      5. Spinal stenosis, unspecified spinal region   continues on lidocaine patch and Tylenol for pain.   6. Vitamin B12 deficiency   continues on supplement.       Electronically signed by: Monik Becerra, CNP

## 2021-06-20 NOTE — LETTER
Letter by Monik Becerra CNP at      Author: Monik Becerra CNP Service: -- Author Type: --    Filed:  Encounter Date: 7/9/2020 Status: (Other)         Patient: Alice Lawson   MR Number: [id number]   YOB: 1940   Date of Visit: 7/9/2020     Code Status:  FULL CODE  Visit Type: Problem Visit     Facility:  [facility] [[id number]]             History of Present Illness: Alice Lawson is a 80 y.o. female who I am seeing today for follow up on the TCU, secondary to neurological symptoms.. Pt recently hospitalized on 6/18/2020.  Hypertension, vitamin B12 deficiency, urinary incontinence, pulmonary nodule, generalized weakness, failure to thrive and physical deconditioning.  Patient hospitalized with metabolic encephalopathy.  Patient treated for UTI secondary to her acute delirium.  She did complete a 5-day course of Cefdinir. However urine culture on 6/18/2020 was negative.  Post antibiotic treatment her delirium  did clear somewhat. However patient presented with some urinary retention.  He had elevated post void residuals.  She was started on a course of tamsulosin initially.  However however after consult with urology a Camargo catheter was placed.  Is recommended for a voiding trial in 1 to 2 weeks at the TCU. Patient with underlying spinal stenosis. There was some question of progression of her chronic disease due to his shuffling gait and new onset of urinary retention.  Patient was evaluated for cauda equina syndrome.  Patient underwent a CT of the spine on 6/22 which demonstrated several multilevel spinal stenosis and degenerative disease.  Orthopedics was consulted who ordered a follow-up with MRI.  MRI was negative.  On 6/21 patient became very agitated and combative.  Hospital associated delirium worsened and patient required one-on-one over the weekend.  Psychiatry was consulted and Seroquel was added.  Patient observed to have visual hallucinations and  neurocognitive symptoms concerning for the possibility of Parkinson's or other neurocognitive disorder such as Lewy body.  However a MRI of the head and brain on 6/22 did not demonstrate any acute abnormalities.  She has a follow-up with neurology clinic on July 8.      Today patient sitting up in bed.  Patient has been weaned off her Seroquel.  Cognition continues improving.  She denies any hallucinations.  No further vivid dreams.  Some intermittent confusion per staff however greatly improved.  Recent removal of Camargo catheter.  Patient and voiding.  She did have some urinary retention last evening.  A bladder scan was obtained which had 511 cc.  She did void with cues.  Post void residual was 138.  I do note some other occasional post void residuals greater than 100.  She was given Flomax during hospitalization for a brief course.  She has completed her oral antibiotic.  She denies any burning or pain with urination.  She is drinking well.  Patient with Parkinson-like syndrome including shuffled gait, freezing, stooped posture mild cogwheel rigidity with occasional tremors.  She has a follow-up with neurology next week.  I did speak with neurology however at this time they would like to hold off on treatment.    Active Ambulatory Problems     Diagnosis Date Noted   ? S/P total knee arthroplasty 01/05/2017   ? Essential hypertension with goal blood pressure less than 130/80    ? Acute blood loss as cause of postoperative anemia    ? Hyperlipidemia, unspecified hyperlipidemia type    ? Acquired hammer toe of left foot 01/18/2020   ? Arthritis of left knee 01/18/2020   ? Artificial knee joint present 01/18/2020   ? Benign neoplasm 01/18/2020   ? Cerebrovascular disease 01/18/2020   ? 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 Dizziness 01/18/2020   ? 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 Solitary pulmonary nodule 01/18/2020   ? Aortic valve insufficiency 06/17/2020   ? Weakness  06/18/2020   ? ACP (advance care planning) 06/18/2020   ? Vitamin B12 deficiency (non anemic) 06/18/2020   ? Generalized muscle weakness 06/18/2020   ? Acute cystitis without hematuria    ? Acute metabolic encephalopathy 06/19/2020   ? Delirium 06/21/2020   ? Complex care coordination 06/21/2020   ? Spinal stenosis of lumbar region without neurogenic claudication 06/21/2020   ? Cognitive and behavioral changes    ? Sleep difficulties    ? Stress reaction with psychomotor agitation      Resolved Ambulatory Problems     Diagnosis Date Noted   ? No Resolved Ambulatory Problems     Past Medical History:   Diagnosis Date   ? Arthritis    ? Cancer (H)    ? Hypertension    ? Melanoma (H)      Current Outpatient Medications   Medication Sig   ? tamsulosin (FLOMAX) 0.4 mg cap Take 0.4 mg by mouth.   ? acetaminophen (TYLENOL) 325 MG tablet Take 2 tablets (650 mg total) by mouth every 4 (four) hours as needed for pain or fever.   ? cyanocobalamin (VITAMIN B-12) 1000 MCG tablet Take 1,000 mcg by mouth daily.   ? fish oil-omega-3 fatty acids (FISH OIL) 300-1,000 mg capsule Take 1 g by mouth daily.   ? lidocaine 4 % patch Place 2 patches on the skin daily. Remove and discard patch with 12 hours or as directed by MD.   ? melatonin 3 mg Tab tablet Take 1 tablet (3 mg total) by mouth at bedtime as needed.   ? metoprolol succinate (TOPROL-XL) 25 MG Take 25 mg by mouth at bedtime.    ? polyethylene glycol (MIRALAX) 17 gram packet Take 1 packet (17 g total) by mouth daily as needed.   ? senna-docusate (PERICOLACE) 8.6-50 mg tablet Take 1 tablet by mouth daily.       Allergies   Allergen Reactions   ? Hydrochlorothiazide Other (See Comments)     Foggy feeling (electrolyte imbalance and ended up in hospital)   ? Rosuvastatin      Other reaction(s): *Unknown, caused abd wall pain         Review of Systems  No fevers or chills. No headache, lightheadedness or dizziness.+ hallucinations.  No SOB, chest pains or palpitations. Appetite is  good. No nausea, vomiting, constipation or diarrhea. Voiding without catheter. Hx of Urinary retention. Pt denies any hallucinations.   Denies any pain in her legs. Otherwise review of systems are negative.     Physical Exam  PHYSICAL EXAMINATION:  General: Awake, Alert, oriented x3, appropriately, follows simple commands, conversant  VS: /79, pulse 62, respirations 16, temperature 97.9, O2 sat 95% on RA. W 170.4 lbs  HEENT:PERRLA, Pink conjunctiva, anicteric sclerae, moist oral mucosa. Facial symmetry. Tongue midline.   NECK: Supple, without any lymphadenopathy, or masses  CVS:  S1  S2, without murmur or gallop.   LUNG: Clear to auscultation, No wheezes, rales or rhonci.  BACK: No kyphosis of the thoracic spine  ABDOMEN: Soft, nontender to palpation, with positive bowel sounds  EXTREMITIES: Moves both upper and lower extremities with generalized weakness, trace pedal edema, no calf tenderness  SKIN: Warm and dry, no rashes or erythema noted  NEUROLOGIC:  pulses palpable. Equal . Mild cogwheel rigidity of the upper extremities. Stooped posturing with shuffling gait. Spatial awareness difficulty with turning at thresholds. Freeze like movements at thresholds or turns.   PSYCHIATRIC: Mild cognitive impairment noted.       Labs:   Lab Results   Component Value Date    WBC 7.3 07/02/2020    HGB 11.7 (L) 07/02/2020    HCT 37.2 07/02/2020    MCV 98 07/02/2020     07/02/2020     Results for orders placed or performed in visit on 07/02/20   Basic Metabolic Panel   Result Value Ref Range    Sodium 140 136 - 145 mmol/L    Potassium 4.2 3.5 - 5.0 mmol/L    Chloride 106 98 - 107 mmol/L    CO2 27 22 - 31 mmol/L    Anion Gap, Calculation 7 5 - 18 mmol/L    Glucose 88 70 - 125 mg/dL    Calcium 9.4 8.5 - 10.5 mg/dL    BUN 18 8 - 28 mg/dL    Creatinine 0.84 0.60 - 1.10 mg/dL    GFR MDRD Af Amer >60 >60 mL/min/1.73m2    GFR MDRD Non Af Amer >60 >60 mL/min/1.73m2           Assessment/Plan:  1. Metabolic  encephalopathy  Hallucinations resolved.  Sundowning improving.    Patient weaned off Seroquel.   2. Acute cystitis with hematuria   patient has completed her Cipro.  Camargo catheter removed.  Patient is having some urinary retention.  Post void residual in the 100s.  I will add that Flomax.  She was on this briefly during hospitalization.   3. Shuffling gait    Questionable Parkinson's. Also correlating symptoms of mild cogwheel rigidity and upper arm tremors.   Consulted with Dr. Shepherd over phone today  Pt has follow up with Neurology on 7/14.    5. Spinal stenosis, unspecified spinal region   continues on lidocaine patch and Tylenol for pain.   6. Vitamin B12 deficiency   continues on supplement.       Electronically signed by: Monik Becerra, CNP

## 2021-06-25 NOTE — TELEPHONE ENCOUNTER
I think if she is nauseated then it might be useful for her to be seen by primary.urgent care  Please have them update me  ty mdg

## 2021-06-25 NOTE — TELEPHONE ENCOUNTER
Not feeling good.  Nausea goes with the lightheadedness, which usually happens while sitting on the toilet.  SBP elevated - running in 135-140s, but is stable.  Per 6/3/21 office visit - ok for higher blood pressures.   Overall - dizziness / lightheadedness has improved.  Slipped off bed last night from sitting on the edge - landed on her butt on the floor.  Pt was not dizzy or lightheaded at the time.  No injuries sustained.  Suggested pt contact PCP to explore other options of nausea and lightheadedness.    Dr Roldan - any new recommendations regarding blood pressures?

## 2021-06-26 NOTE — PATIENT INSTRUCTIONS - HE
We talked about monitoring your blood pressure just sitting not standing and if blood pressure is lower than 100 on the top consider drinking some more fluids or eating a pickle.My nurse is Angeilque and her number is [phone].

## 2021-06-29 NOTE — PROGRESS NOTES
"Progress Notes by Keshawn Santiago NP at 6/17/2020  2:10 PM     Author: Keshawn Santiago NP Service: -- Author Type: Nurse Practitioner    Filed: 6/17/2020  2:59 PM Encounter Date: 6/17/2020 Status: Signed    : Keshawn Santiago NP (Nurse Practitioner)       The patient has been notified of following:     \"This telephone visit will be conducted via a call between you and your physician/provider. We have found that certain health care needs can be provided without the need for a physical exam.  This service lets us provide the care you need with a phone conversation.  If a prescription is necessary we can send it directly to your pharmacy.  If lab work is needed we can place an order for that and you can then stop by our lab to have the test done at a later time. If during the course of the call the physician/provider feels a telephone visit is not appropriate, you will not be charged for this service.\" Verbal consent has been obtained for this service by care team member:         HEART CARE PHONE ENCOUNTER        The patient has chosen to have the visit conducted as a telephone visit, to reduce risk of exposure given the current status of Coronavirus in our community. This telephone visit is being conducted via a call between the patient and physician/provider. Health care needs are being provided without a physical exam.     Assessment/Recommendations     Assessment:    1. Dizziness:  She was suspected to have orthostatic dizziness. Losartan dose was reduced. She felt better for few days and noted dizziness again with SBP elevated in 170's. Dr. Roldan increased her Losartan back to 50 mg daily. She is also on Metoprolol Succinate 25 mg daily at bed time. Most recent ECHO showed normal LVEF with mild to moderate mitral insufficiency and mild to moderate aortic insufficiency.    ECHO on 6/9/20 reviewed:  Summary     1. Normal left ventricular size and systolic performance with a visually estimated ejection fraction " of 60-65%.   2. There is mild to moderate aortic insufficiency.   3. There is mild, to perhaps mild-moderate, mitral insufficiency.   4. Normal right ventricular size and systolic performance.        Reports her BP has been stable this week but no readings available    Her current BP is 124/86.    Reports drinking inadequate fluid.  Reports not feeling well, poor sleep, chronic pain in her legs due to spinal stenosis and feeling flushed.  She denies cardiac symptoms or any strokelike symptoms.     Plan:  1. Continue current HTN regimen.  2. Instructed to change position slowly  3. Adequate hydration of at least 55-64 ounces of fluid per day.   5. Follow up with PCP regarding ongoing leg pain.  Instructed to go to ER if worsening leg pain or chest pain, shortness of breath, headache, blurred vision, double vision or weakness in arms and legs.    Follow Up Plan: Follow up with Dr. Roldan as scheduled on 7/7/20  I have reviewed the note as documented.  This accurately captures the substance of my conversation with the patient.    Total time of call between patient and provider was 20  minutes   Start Time:1423  Stop Time:1443     History of Present Illness/Subjective    Alice Lawson is a 80 y.o. female who is being evaluated via a billable telephone visit.    Alice has a significant past medical history of hypertension, hyperlipidemia, cerebrovascular disease, moderate aortic insufficiency, and dizziness.    Patient recently had a virtual visit with Dr. Roldan.  She was noted to have orthostatic dizziness.  Her losartan dose was reduced.  Her blood pressure started to go up systolic in the 170s.  Therefore Dr. Roldan increased her losartan back to 50 mg daily.    Today, Jenna tells me that she felt slight improvement in her dizziness when her losartan dose was reduced.  After a few days, she started to feel about the same.  Her blood pressure trend started to go up on the lower dose of losartan.  Blood pressure has  improved since she went back to previous dose of losartan.  She reports inadequate fluid intake.  Her main concern today is her ongoing pain from spinal stenosis and not feeling well.  She also reports somewhat feeling flushed.  She reports poor sleep.  She denies having any chest pain, heart palpitation, low stability edema, PND, orthopnea, headache, blurry visions or double visions, or any weakness, numbness or tingling in arms or legs.  She said her pain has been ongoing and dizziness has been ongoing and she has an appointment with neurology in July.  She denies fever or chills.    I have reviewed and updated the patient's Past Medical History, Social History, Family History and Medication List.     Physical Examination not performed given phone encounter Review of Systems         Medical History  Surgical History Family History Social History   Past Medical History:   Diagnosis Date   ? Arthritis    ? Cancer (H)     melanomia   ? Hypertension    ? Melanoma (H)     Past Surgical History:   Procedure Laterality Date   ? CATARACT EXTRACTION Bilateral     2014   ? KNEE ARTHROSCOPY Bilateral    ? NECK SURGERY     ? REPLACEMENT TOTAL KNEE Left 1/5/2017    Procedure: LEFT TOTAL KNEE ARTHROPLASTY COMPUTER ASSIST;  Surgeon: Micah Gallagher MD;  Location: [facility];  Service:     No family history on file. Social History     Socioeconomic History   ? Marital status:      Spouse name: Not on file   ? Number of children: Not on file   ? Years of education: Not on file   ? Highest education level: Not on file   Occupational History   ? Not on file   Social Needs   ? Financial resource strain: Not on file   ? Food insecurity     Worry: Not on file     Inability: Not on file   ? Transportation needs     Medical: Not on file     Non-medical: Not on file   Tobacco Use   ? Smoking status: Never Smoker   Substance and Sexual Activity   ? Alcohol use: No   ? Drug use: No   ? Sexual activity: Not on file    Lifestyle   ? Physical activity     Days per week: Not on file     Minutes per session: Not on file   ? Stress: Not on file   Relationships   ? Social connections     Talks on phone: Not on file     Gets together: Not on file     Attends Latter day service: Not on file     Active member of club or organization: Not on file     Attends meetings of clubs or organizations: Not on file     Relationship status: Not on file   ? Intimate partner violence     Fear of current or ex partner: Not on file     Emotionally abused: Not on file     Physically abused: Not on file     Forced sexual activity: Not on file   Other Topics Concern   ? Not on file   Social History Narrative   ? Not on file          Medications  Allergies   Current Outpatient Medications   Medication Sig Dispense Refill   ? cyanocobalamin (VITAMIN B-12) 1000 MCG tablet Take 1,000 mcg by mouth daily.     ? losartan (COZAAR) 50 MG tablet Take 50 mg by mouth daily. Takes 50mg daily     ? metoprolol succinate (TOPROL-XL) 25 MG Take 25 mg by mouth at bedtime.      ? docoshexanoic acid-eicosapent 500 mg (FISH OIL) 500-100 mg cap capsule Take 500 mg by mouth daily.       No current facility-administered medications for this visit.     Allergies   Allergen Reactions   ? Hydrochlorothiazide Other (See Comments)     Foggy feeling (electrolyte imbalance and ended up in hospital)   ? Rosuvastatin      Other reaction(s): *Unknown, caused abd wall pain         Lab Results    Chemistry/lipid CBC Cardiac Enzymes/BNP/TSH/INR   Lab Results   Component Value Date    CHOL 149 08/25/2017    HDL 59 08/25/2017    LDLCALC 79 08/25/2017    TRIG 54 08/25/2017    CREATININE 0.89 04/23/2020    BUN 9 04/23/2020    K 4.8 04/23/2020     04/23/2020     04/23/2020    CO2 28 04/23/2020    Lab Results   Component Value Date    WBC 6.9 01/18/2020    HGB 13.1 01/18/2020    HCT 39.3 01/18/2020    MCV 94 01/18/2020     01/18/2020    Lab Results   Component Value Date     CKTOTAL 96 05/21/2019    CKMB 3 12/06/2013    TROPONINI <0.01 01/18/2020    TSH 1.78 05/21/2019    INR 1.00 10/16/2018        Keshawn Santiago

## 2021-06-29 NOTE — PROGRESS NOTES
"Progress Notes by Frank Roldan MD at 7/7/2020 11:30 AM     Author: Frank Roldan MD Service: -- Author Type: Physician    Filed: 7/7/2020 12:01 PM Encounter Date: 7/7/2020 Status: Signed    : Frank Roldan MD (Physician)           The patient has been notified of following:     \"This telephone visit will be conducted via a call between you and your physician/provider. We have found that certain health care needs can be provided without the need for a physical exam.  This service lets us provide the care you need with a phone conversation.  If a prescription is necessary we can send it directly to your pharmacy.  If lab work is needed we can place an order for that and you can then stop by our lab to have the test done at a later time. If during the course of the call the physician/provider feels a telephone visit is not appropriate, you will not be charged for this service.\" Verbal consent has been obtained for this service by care team member:         HEART CARE PHONE ENCOUNTER        The patient has chosen to have the visit conducted as a telephone visit, to reduce risk of exposure given the current status of Coronavirus in our community. This telephone visit is being conducted via a call between the patient and physician/provider. Health care needs are being provided without a physical exam.     Assessment/Recommendations   Assessment:    1.  Initially seen May 2020 symptoms of dizziness.  She is now in the transitional care unit where I had the opportunity to speak with the nurse practitioner that is following her.  There is no current cardiovascular concerns.  Blood pressure and pulse the current combination of medications appear to be under good control.  I did learn that her is some concern about possible Parkinson's and there is plans to further discussion with neurology.  I indicated the cardiology is available to help if there is other additional cardiovascular questions or concerns please " do not hesitate to contact us.    Plan:  1.  6 months      Follow Up Plan: Follow up in 6 months  I have reviewed the note as documented.  This accurately captures the substance of my conversation with the patient.    Total time of call between patient and provider was 11 minutes   Start Time:1143  Stop Time:1154       History of Present Illness/Subjective    Alice Lawson is a 80 y.o. female who is being evaluated via a billable telephone visit.    I spoke to the patient as well as the nurse and the nurse practitioner caring for her at the transitional care unit.  There is no specific cardiovascular concerns reported.  We met May 26, 2020 and a virtual visit because of dizzy episodes.  Thought that perhaps some of the symptom was related to orthostatic dizziness and I tried to cut back on her losartan.  She did contact us on a number of occasions with some blood pressure readings that were mildly elevated.  She was evaluated in the emergency room January 2020 with a reported brief syncopal episode.  She subsequently was admitted to the hospital and discharged June 25 to a transitional care unit.  The nurse practitioner caring for her indicates that there has been concern about the possibility of parkinsonian features.  Patient is not experiencing any chest pain or shortness of breath but does have some persistent sensation of wooziness.  Reviewing the current combination of medications she is no longer taking losartan and is on low-dose metoprolol.  She had an echocardiogram June 2020 that revealed normal systolic function without significant valve abnormality.  He wore a Holter monitor in January 2020 that demonstrated short runs of primary atrial tachycardia but no significant dysrhythmia.  She was discharged June 25 some concerns about metabolic encephalopathy with worsening delirium perhaps in part related to a urinary tract infection.  Reportedly this is slowly improving.      In 2004 she had an abnormal  stress echocardiogram which led to coronary angiography that reportedly demonstrated no significant obstructive disease at that time.  Echocardiogram in the chart from December 2004 just with normal systolic function, normal right ventricular systolic function and moderate aortic insufficiency.  I have reviewed and updated the patient's Past Medical History, Social History, Family History and Medication List.     Physical Examination not performed given phone encounter Review of Systems                                                Medical History  Surgical History Family History Social History   Past Medical History:   Diagnosis Date   ? Arthritis    ? Cancer (H)     melanomia   ? Hypertension    ? Melanoma (H)     Past Surgical History:   Procedure Laterality Date   ? CATARACT EXTRACTION Bilateral     2014   ? KNEE ARTHROSCOPY Bilateral    ? NECK SURGERY     ? REPLACEMENT TOTAL KNEE Left 1/5/2017    Procedure: LEFT TOTAL KNEE ARTHROPLASTY COMPUTER ASSIST;  Surgeon: Micah Gallagher MD;  Location: [facility];  Service:     No family history on file. Social History     Socioeconomic History   ? Marital status:      Spouse name: Not on file   ? Number of children: Not on file   ? Years of education: Not on file   ? Highest education level: Not on file   Occupational History   ? Not on file   Social Needs   ? Financial resource strain: Not on file   ? Food insecurity     Worry: Not on file     Inability: Not on file   ? Transportation needs     Medical: Not on file     Non-medical: Not on file   Tobacco Use   ? Smoking status: Never Smoker   Substance and Sexual Activity   ? Alcohol use: No   ? Drug use: No   ? Sexual activity: Not on file   Lifestyle   ? Physical activity     Days per week: Not on file     Minutes per session: Not on file   ? Stress: Not on file   Relationships   ? Social connections     Talks on phone: Not on file     Gets together: Not on file     Attends Jain service: Not on  file     Active member of club or organization: Not on file     Attends meetings of clubs or organizations: Not on file     Relationship status: Not on file   ? Intimate partner violence     Fear of current or ex partner: Not on file     Emotionally abused: Not on file     Physically abused: Not on file     Forced sexual activity: Not on file   Other Topics Concern   ? Not on file   Social History Narrative   ? Not on file          Medications  Allergies   Current Outpatient Medications   Medication Sig Dispense Refill   ? ciprofloxacin HCl (CIPRO) 250 MG tablet Take 250 mg by mouth 2 (two) times a day.     ? cyanocobalamin (VITAMIN B-12) 1000 MCG tablet Take 1,000 mcg by mouth daily.     ? fish oil-omega-3 fatty acids (FISH OIL) 300-1,000 mg capsule Take 1 g by mouth daily.     ? lidocaine 4 % patch Place 2 patches on the skin daily. Remove and discard patch with 12 hours or as directed by MD.  0   ? melatonin 3 mg Tab tablet Take 1 tablet (3 mg total) by mouth at bedtime as needed.  0   ? metoprolol succinate (TOPROL-XL) 25 MG Take 25 mg by mouth at bedtime.      ? QUEtiapine (SEROQUEL) 25 MG tablet Take 0.5 tablets (12.5 mg total) by mouth 3 (three) times a day. (Patient taking differently: Take 12.5 mg by mouth 2 (two) times a day. )  0   ? acetaminophen (TYLENOL) 325 MG tablet Take 2 tablets (650 mg total) by mouth every 4 (four) hours as needed for pain or fever.  0   ? polyethylene glycol (MIRALAX) 17 gram packet Take 1 packet (17 g total) by mouth daily as needed.  0   ? senna-docusate (PERICOLACE) 8.6-50 mg tablet Take 1 tablet by mouth daily.  0     No current facility-administered medications for this visit.     Allergies   Allergen Reactions   ? Hydrochlorothiazide Other (See Comments)     Foggy feeling (electrolyte imbalance and ended up in hospital)   ? Rosuvastatin      Other reaction(s): *Unknown, caused abd wall pain         Lab Results    Chemistry/lipid CBC Cardiac Enzymes/BNP/TSH/INR   Lab  Results   Component Value Date    CHOL 149 08/25/2017    HDL 59 08/25/2017    LDLCALC 79 08/25/2017    TRIG 54 08/25/2017    CREATININE 0.84 07/02/2020    BUN 18 07/02/2020    K 4.2 07/02/2020     07/02/2020     07/02/2020    CO2 27 07/02/2020    Lab Results   Component Value Date    WBC 7.3 07/02/2020    HGB 11.7 (L) 07/02/2020    HCT 37.2 07/02/2020    MCV 98 07/02/2020     07/02/2020    Lab Results   Component Value Date    CKTOTAL 96 05/21/2019    CKMB 3 12/06/2013    TROPONINI <0.01 06/18/2020     06/18/2020    TSH 0.87 06/24/2020    INR 1.00 10/16/2018        Frank Roldan

## 2021-06-29 NOTE — PROGRESS NOTES
"Progress Notes by Frank Roldan MD at 5/26/2020  2:30 PM     Author: Frank Roldan MD Service: -- Author Type: Physician    Filed: 6/10/2020  3:42 PM Encounter Date: 5/26/2020 Status: Addendum    : Frank Roldan MD (Physician)    Related Notes: Original Note by Frank Roldan MD (Physician) filed at 5/26/2020  2:59 PM           The patient has been notified of following:     \"This telephone visit will be conducted via a call between you and your physician/provider. We have found that certain health care needs can be provided without the need for a physical exam.  This service lets us provide the care you need with a phone conversation.  If a prescription is necessary we can send it directly to your pharmacy.  If lab work is needed we can place an order for that and you can then stop by our lab to have the test done at a later time. If during the course of the call the physician/provider feels a telephone visit is not appropriate, you will not be charged for this service.\" Verbal consent has been obtained for this service by care team member:         HEART CARE PHONE ENCOUNTER        The patient has chosen to have the visit conducted as a telephone visit, to reduce risk of exposure given the current status of Coronavirus in our community. This telephone visit is being conducted via a call between the patient and physician/provider. Health care needs are being provided without a physical exam.     Assessment/Recommendations   Assessment:    1.  Dizzy episodes.  Based on our discussion this sounds most consistent with orthostatic dizziness.  She has had improvement with keeping herself better hydrated.  She has had improvement with decrease in the dose of losartan.  We talked about keeping herself hydrated.  We will changing positions slowly.  Talked about cutting the losartan to 25 mg daily.  She will monitor her blood pressure over the next few weeks and update me with pressure readings as well as " Chief Complaint   Patient presents with    Diabetes    Memory Loss     Pts wife reports his short term memory is bad        HPI:  The patient is returning for management of uncontrolled type 2 diabetes. At his recent office visit last month he reported good glycemic control by fingerstick check. However, his A1c was 14.5 on October 18, 2018. Today the patient reports that he has not been taking his diabetes medication or checking his glucose for the last several months. He is unable to tell me why he stopped the medication. He specifically denies that there were issues with cost, side effects, or depression. ROS:  Wife reports problems with short term memory. He reports tingling pain in his extremities, urinary frequency, and generalized fatigue. EXAM:  /68 (Site: Left Upper Arm, Position: Sitting, Cuff Size: Large Adult)   Pulse 88   Ht 5' 8\" (1.727 m)   Wt 201 lb 3.2 oz (91.3 kg)   BMI 30.59 kg/m²    GEN: Not in acute distress  CV: regular rate and rhythm, 2/6 systolic murmur  Resp: normal effort, clear auscultation bilaterally  Abd: soft, nontender to palpation. No peripheral edema           A1C 14.5 on 10/18/19    Lab Results   Component Value Date    CREATININE 1.3 10/18/2018    BUN 16 10/18/2018     10/18/2018    K 5.2 (H) 10/18/2018    CL 96 (L) 10/18/2018    CO2 23 10/18/2018           A/P   Diagnosis Orders   1. Type II diabetes mellitus with nephropathy ([facility] [address]      The patient has poorly controlled type 2 diabetes secondary to medication nonadherence. He previously had excellent control when he was taking his medicines. It is unclear why he is not on metformin, and I have recommended that he begin 500 mg twice daily of metformin. We discussed potential side effects including diarrhea. He will also resume glipizide and Lantus at previous dosing. We discussed the potential for hypoglycemia.   He was advised to contact me if he is expressing hypoglycemia so his any particular symptoms.    2.  Risk factor modification.  She reportedly has been intolerant to statins although details are somewhat limited.  LDL cholesterol 70 9 August 2017 but was as high as 156 in 2015.  He does not fully recall when she took statins or when she discontinued it.    Plan:  1.  Echoardiogram to further define the prior history of moderate aortic insufficiency.  2.  Decrease losartan to 25 mg daily.  3.  Office visit within the next few weeks.      Follow Up Plan: Follow up in 2 weeks with nurse practitioner follow-up with me in 6 to 8 weeks.  I have reviewed the note as documented.  This accurately captures the substance of my conversation with the patient.    Total time of call between patient and provider was 25 minutes   Start Time:215  Stop Time:*240       History of Present Illness/Subjective    Alice Lawson is a 80 y.o. female who is being evaluated via a billable telephone visit.    She is a new patient to me.  I have reviewed the chart record.  Her preference was to meet at least initially by telephone given the concerns related to the virus.  I was asked to visit with her because of some sciatic lightheadedness.  He was evaluated in the emergency room January 2020 who reported a brief syncopal event while at a restaurant.  She did not recall the details but her family members indicated that she briefly lost consciousness.  She tells me on that particular day she was feeling lightheaded and woozy primarily with standing.  She states that in the interim she is done a better job with respect to keeping herself hydrated.  She has curtailed her caffeine in her diet.  Her primary care physician decreased her losartan to 50 mg daily and she has had an overall improvement in the lightheaded symptoms.  She states that they are at least 30 to 50% better.  Has had no complaints of syncope or near syncope since the initial presentation.  He has not been aware of palpitations.  She states that  if she feels woozy if she leans against the wall this will typically resolve within a few seconds.  She has no complaints of chest pain, shortness of breath, orthopnea or PND.  She is diligent about monitoring her blood pressures and typically states they are in the 120s over 70s at times she has had blood pressures in the high 90s.  More recently she is not had higher blood pressure specifically above 140.  Your Holter monitor at the request of her primary care provider a few months ago that demonstrated no significant dysrhythmia.  She had very short runs of atrial tachycardia which she was not aware of clinically.    In 2004 she had an abnormal stress echocardiogram which led to coronary angiography that reportedly demonstrated no significant obstructive disease at that time.  Echocardiogram in the chart from December 2004 just with normal systolic function, normal right ventricular systolic function and moderate aortic insufficiency.    ECG from January 2020 demonstrates normal sinus rhythm, essentially normal EKG.  No significant change from 2018.  I have reviewed and updated the patient's Past Medical History, Social History, Family History and Medication List.     Physical Examination not performed given phone encounter Review of Systems                                                Medical History  Surgical History Family History Social History   Past Medical History:   Diagnosis Date   ? Arthritis    ? Cancer (H)     melanomia   ? Hypertension    ? Melanoma (H)    Spinal stenosis  Per lipidemia reportedly intolerant to statins.  MRI of the brain in 2017 reportedly is demonstrating small vessel disease Past Surgical History:   Procedure Laterality Date   ? CATARACT EXTRACTION Bilateral     2014   ? KNEE ARTHROSCOPY Bilateral    ? NECK SURGERY     ? REPLACEMENT TOTAL KNEE Left 1/5/2017    Procedure: LEFT TOTAL KNEE ARTHROPLASTY COMPUTER ASSIST;  Surgeon: Micah Gallagher MD;  Location: [facility] OR;   Service:     No family history on file. Social History     Socioeconomic History   ? Marital status:      Spouse name: Not on file   ? Number of children: Not on file   ? Years of education: Not on file   ? Highest education level: Not on file   Occupational History   ? Not on file   Social Needs   ? Financial resource strain: Not on file   ? Food insecurity     Worry: Not on file     Inability: Not on file   ? Transportation needs     Medical: Not on file     Non-medical: Not on file   Tobacco Use   ? Smoking status: Never Smoker   Substance and Sexual Activity   ? Alcohol use: No   ? Drug use: No   ? Sexual activity: Not on file   Lifestyle   ? Physical activity     Days per week: Not on file     Minutes per session: Not on file   ? Stress: Not on file   Relationships   ? Social connections     Talks on phone: Not on file     Gets together: Not on file     Attends Baptism service: Not on file     Active member of club or organization: Not on file     Attends meetings of clubs or organizations: Not on file     Relationship status: Not on file   ? Intimate partner violence     Fear of current or ex partner: Not on file     Emotionally abused: Not on file     Physically abused: Not on file     Forced sexual activity: Not on file   Other Topics Concern   ? Not on file   Social History Narrative   ? Not on file          Medications  Allergies   Current Outpatient Medications   Medication Sig Dispense Refill   ? cyanocobalamin (VITAMIN B-12) 1000 MCG tablet Take 1,000 mcg by mouth daily.     ? losartan (COZAAR) 50 MG tablet Take 50 mg by mouth daily. Takes 50mg daily     ? metoprolol succinate (TOPROL-XL) 25 MG Take 25 mg by mouth at bedtime.      ? docoshexanoic acid-eicosapent 500 mg (FISH OIL) 500-100 mg cap capsule Take 500 mg by mouth daily.       No current facility-administered medications for this visit.     Allergies   Allergen Reactions   ? Hydrochlorothiazide Other (See Comments)     Foggy feeling  (electrolyte imbalance and ended up in hospital)   ? Rosuvastatin      Other reaction(s): *Unknown, caused abd wall pain         Lab Results    Chemistry/lipid CBC Cardiac Enzymes/BNP/TSH/INR   Lab Results   Component Value Date    CHOL 149 08/25/2017    HDL 59 08/25/2017    LDLCALC 79 08/25/2017    TRIG 54 08/25/2017    CREATININE 0.89 04/23/2020    BUN 9 04/23/2020    K 4.8 04/23/2020     04/23/2020     04/23/2020    CO2 28 04/23/2020    Lab Results   Component Value Date    WBC 6.9 01/18/2020    HGB 13.1 01/18/2020    HCT 39.3 01/18/2020    MCV 94 01/18/2020     01/18/2020    Lab Results   Component Value Date    CKTOTAL 96 05/21/2019    CKMB 3 12/06/2013    TROPONINI <0.01 01/18/2020    TSH 1.78 05/21/2019    INR 1.00 10/16/2018        Frank Roldan

## 2021-06-30 ENCOUNTER — RECORDS - HEALTHEAST (OUTPATIENT)
Dept: LAB | Facility: CLINIC | Age: 81
End: 2021-06-30

## 2021-06-30 NOTE — PROGRESS NOTES
"Progress Notes by Frank Roldan MD at 2/11/2021 10:50 AM     Author: Frank Roldan MD Service: -- Author Type: Physician    Filed: 2/11/2021 11:29 AM Encounter Date: 2/11/2021 Status: Signed    : Frank Roldan MD (Physician)           The patient has been notified of following:     \"This video visit will be conducted via a call between you and your physician/provider. We have found that certain health care needs can be provided without the need for an in-person physical exam.  This service lets us provide the care you need with a video conversation.  If a prescription is necessary we can send it directly to your pharmacy.  If lab work is needed we can place an order for that and you can then stop by our lab to have the test done at a later time.      Patient has given verbal consent to a Video visit? Yes    HEART CARE VIDEO ENCOUNTER        The patient has chosen to have the visit conducted as a video visit, to reduce risk of exposure given the current status of Coronavirus in our community. This video visit is being conducted via a call between the patient and physician/provider. Health care needs are being provided without a physical exam.     Assessment/Recommendations   Assessment/Plan:  1.  History of dizziness and orthostatic hypotension.  There continues to be concerns with respect to lightheadedness upon standing.  She does have underlying Parkinson's and has been followed by neurology and has been taken off Sinemet per my understanding of discussion with she and her daughter today.  I plan to try to contact her neurologist to discuss further.  I suspect a significant component of autonomic dysfunction.  She is off any antihypertensive medications and keeping herself well-hydrated and using support stockings.  I do think however that it would be prudent for her to utilize an event monitor to exclude any rhythm associated issue and therefore will make arrangements for this.  Echocardiogram from " June 2020 revealed normal systolic function with mild to moderate aortic insufficiency and mild to moderate mitral insufficiency.    1.  Event monitor.  2.  Plan to try to connect with her neurologist.    Follow Up Plan:  1months  I have reviewed the note as documented.  This accurately captures the substance of my conversation with the patient.    Total time of video between patient and provider was 15 minutes   Start Time:1100  Stop Time:1115    Originating Location (pt. Location): Home    Distant Location (provider location):  [facility]     Mode of Communication:  Video Conference via [email or web address]       History of Present Illness/Subjective    Alice Lawson is a 81 y.o. female who is being evaluated via a billable video visit and has consented to a video visit. Alice Lawson has a history of Parkinson's and orthostatic hypotension.  Her neurologist is  at the [facility].  Patient has come off Sinemet and has had improvement in her orthostatic blood pressure results although continues to have periods of falling events.  This occurred oftentimes in the bathroom.  She has not injured herself.  She is trying to keep her self well-hydrated and she is not utilizing support stockings and has been off all antihypertensive medications.  She has not experienced any chest pain or shortness of breath.    Conclusion       HOLTER MONITOR     INTERPRETATION DATE:  01/29/2020     TEST DATE:  01/27/2020     INTERPRETATION:  Predominant rhythm is sinus rhythm with rates ranging from 52 beats  per minute to 98 beats per minute.  There were no significant pauses or bradycardia.   Occasional atrial premature beats were noted, 288 over 24 hours.  There were 3  short runs of primary atrial tachycardia, the longest lasting approximately 3  seconds at a rate of approximately 135 beats per minute.  No atrial fibrillation was  seen.  Rare single ventricular premature beats were present, 33 over 24  hours.   Patient reported feeling dizzy on multiple occasions.  Three episodes which were  confirmed in time were associated with normal sinus rhythm, rate 70 to 90 beats per  minute without ectopy.     CONCLUSION:  Normal Holter.  Short runs of primary atrial tachycardia noted.   Dizziness associated with sinus rhythm without ectopy.        OLGA DEGROOT MD  mn  D 2020 15:24:49  T 2020 15:42:18  R 2020 15:42:18     Frank Roldan MD on 6/10/2020 15:42   Echo Complete  Order# [id number]  Reading physician: Dequan Leach MD Ordering physician: Frank Roldan MD Study date: 20   Performing Date Performing Department   2020  CARDIAC TESTING [[id number]]   Patient Information    Patient Name   Alice Lawson MRN   [id number] Sex   Female  1   1940 (80 y.o.)   Indications    Dx: Syncope [R55 (ICD-10-CM)]   Summary    1. Normal left ventricular size and systolic performance with a visually estimated ejection fraction of 60-65%.   2. There is mild to moderate aortic insufficiency.   3. There is mild, to perhaps mild-moderate, mitral insufficiency.   4. Normal right ventricular size and systolic performance.        I have reviewed and updated the patient's Past Medical History, Social History, Family History and Medication List.     Physical Examination performed via live video encounter Review of Systems   General Appearance:   no distress, normal body habitus, upright.   ENT/Mouth: membranes moist, no nasal discharge or bleeding gums.  Normal head shape, no evidence of injury or laceration.     EYES:  no scleral icterus, normal conjunctivae   Neck:  Supple   Chest/Lungs:   No audible wheezing equal chest wall expansion. Non labored breathing.  No cough.   Cardiovascular:      Abdomen:     Extremities:    Skin:    Neurologic:    Psychiatric: alert and oriented x3, calm                                               Medical History  Surgical History Family History  Social History   Past Medical History:   Diagnosis Date   ? Arthritis    ? Cancer (H)     melanomia   ? Hypertension    ? Melanoma (H)     Past Surgical History:   Procedure Laterality Date   ? CATARACT EXTRACTION Bilateral     2014   ? KNEE ARTHROSCOPY Bilateral    ? NECK SURGERY     ? REPLACEMENT TOTAL KNEE Left 1/5/2017    Procedure: LEFT TOTAL KNEE ARTHROPLASTY COMPUTER ASSIST;  Surgeon: Micah Gallagher MD;  Location: [facility] Main OR;  Service:     No family history on file.   Social History     Socioeconomic History   ? Marital status:      Spouse name: Not on file   ? Number of children: Not on file   ? Years of education: Not on file   ? Highest education level: Not on file   Occupational History   ? Not on file   Social Needs   ? Financial resource strain: Not on file   ? Food insecurity     Worry: Not on file     Inability: Not on file   ? Transportation needs     Medical: Not on file     Non-medical: Not on file   Tobacco Use   ? Smoking status: Never Smoker   Substance and Sexual Activity   ? Alcohol use: No   ? Drug use: No   ? Sexual activity: Not on file   Lifestyle   ? Physical activity     Days per week: Not on file     Minutes per session: Not on file   ? Stress: Not on file   Relationships   ? Social connections     Talks on phone: Not on file     Gets together: Not on file     Attends Spiritism service: Not on file     Active member of club or organization: Not on file     Attends meetings of clubs or organizations: Not on file     Relationship status: Not on file   ? Intimate partner violence     Fear of current or ex partner: Not on file     Emotionally abused: Not on file     Physically abused: Not on file     Forced sexual activity: Not on file   Other Topics Concern   ? Not on file   Social History Narrative   ? Not on file          Medications  Allergies   Current Outpatient Medications   Medication Sig Dispense Refill   ? acetaminophen (TYLENOL) 325 MG tablet Take 2 tablets (650  mg total) by mouth every 4 (four) hours as needed for pain or fever.  0   ? cyanocobalamin (VITAMIN B-12) 1000 MCG tablet Take 1,000 mcg by mouth daily.     ? fish oil-omega-3 fatty acids (FISH OIL) 300-1,000 mg capsule Take 1 g by mouth daily.     ? melatonin 3 mg Tab tablet Take 1 tablet (3 mg total) by mouth at bedtime as needed.  0   ? nitrofurantoin (MACRODANTIN) 50 MG capsule TAKE 1 CAPSULE BY MOUTH AT BEDTIME FOR INFECTION PREVENTION     ? carbidopa-levodopa (SINEMET)  mg per tablet Take 1 tablet by mouth 2 (two) times a day.      ? lidocaine 4 % patch Place 2 patches on the skin daily. Remove and discard patch with 12 hours or as directed by MD.  0   ? polyethylene glycol (MIRALAX) 17 gram packet Take 1 packet (17 g total) by mouth daily as needed.  0   ? senna-docusate (PERICOLACE) 8.6-50 mg tablet Take 1 tablet by mouth daily.  0     No current facility-administered medications for this visit.     Allergies   Allergen Reactions   ? Hydrochlorothiazide Other (See Comments)     Foggy feeling (electrolyte imbalance and ended up in hospital)   ? Rosuvastatin      Other reaction(s): *Unknown, caused abd wall pain         Lab Results    Chemistry/lipid CBC Cardiac Enzymes/BNP/TSH/INR   Lab Results   Component Value Date    CHOL 204 (H) 08/12/2020    HDL 66 08/12/2020    LDLCALC 123 08/12/2020    TRIG 77 08/12/2020    CREATININE 0.86 08/12/2020    BUN 14 08/12/2020    K 5.3 (H) 08/12/2020     08/12/2020     08/12/2020    CO2 24 08/12/2020    Lab Results   Component Value Date    WBC 7.0 08/07/2020    HGB 13.5 08/07/2020    HCT 40.6 08/07/2020    MCV 95 08/07/2020     08/07/2020    Lab Results   Component Value Date    CKTOTAL 96 05/21/2019    CKMB 3 12/06/2013    TROPONINI <0.01 06/18/2020     06/18/2020    TSH 1.28 08/12/2020    INR 1.00 10/16/2018        Frank Roldan

## 2021-07-01 LAB — BACTERIA SPEC CULT: NORMAL

## 2021-07-02 ENCOUNTER — RECORDS - HEALTHEAST (OUTPATIENT)
Dept: LAB | Facility: CLINIC | Age: 81
End: 2021-07-02

## 2021-07-02 LAB
ALBUMIN SERPL-MCNC: 3.9 G/DL (ref 3.5–5)
ALP SERPL-CCNC: 60 U/L (ref 45–120)
ALT SERPL W P-5'-P-CCNC: <9 U/L (ref 0–45)
ANION GAP SERPL CALCULATED.3IONS-SCNC: 11 MMOL/L (ref 5–18)
AST SERPL W P-5'-P-CCNC: 8 U/L (ref 0–40)
BILIRUB SERPL-MCNC: 0.5 MG/DL (ref 0–1)
BUN SERPL-MCNC: 17 MG/DL (ref 8–28)
CALCIUM SERPL-MCNC: 10.1 MG/DL (ref 8.5–10.5)
CHLORIDE BLD-SCNC: 105 MMOL/L (ref 98–107)
CO2 SERPL-SCNC: 25 MMOL/L (ref 22–31)
CREAT SERPL-MCNC: 0.88 MG/DL (ref 0.6–1.1)
GFR SERPL CREATININE-BSD FRML MDRD: >60 ML/MIN/1.73M2
GLUCOSE BLD-MCNC: 95 MG/DL (ref 70–125)
POTASSIUM BLD-SCNC: 4.3 MMOL/L (ref 3.5–5)
PROT SERPL-MCNC: 6.6 G/DL (ref 6–8)
SODIUM SERPL-SCNC: 141 MMOL/L (ref 136–145)

## 2021-07-03 LAB — BACTERIA SPEC CULT: NO GROWTH

## 2021-07-04 NOTE — PROGRESS NOTES
Progress Notes by Frank Roldan MD at 6/3/2021  2:50 PM     Author: Frank Roldan MD Service: -- Author Type: Physician    Filed: 6/3/2021  3:56 PM Encounter Date: 6/3/2021 Status: Signed    : Frank Roldan MD (Physician)             [facility] Progress Note    Assessment:  1.  History of orthostatic hypotension and dizziness perhaps exacerbated by her diagnosis of Parkinson-like illness.  She indicates that she is seeing a new neurologist now and has been on low-dose Sinemet.  The most part the lightheaded events have been improved.  She does report some occasional awareness of dizziness especially if she is in the kitchen.  She has been off antihypertensive medications and we did talk about allowing her blood pressure to be higher to avoid hypotensive events.  She does monitor her blood pressure and we talked about when she is documenting blood pressures that are 100 systolic or less that she think about taking more fluid intake at that time and consider eating a pickle or other higher salt foods.  We discussed changing positions slowly and utilizing support stockings which she does utilize.  We talked about other potential medications that include midrodrine, fludrocortisone.  At this point she would like to avoid medications but will monitor her symptoms and update me.  Event monitor did not demonstrate any significant rhythm abnormality.    2.  Remote history of an abnormal stress test with subsequent coronary angiogram by report demonstrating no obstructive coronary artery disease.    3.  Echocardiogram demonstrates mild to moderate aortic insufficiency and mild to moderate mitral insufficiency with planned periodic monitoring and follow-up.      Plan: Follow-up in 6 months as outlined above.    1. Dizziness           An After Visit Summary was printed and given to the patient.    Subjective:    Alice Lawson is a 81 y.o. female who returned for a planned  follow up  visit.  She is accompanied by her daughter.  We have been visiting because of some episodic dizziness upon standing consistent with orthostatic hypotension.  She does have a history of Parkinson's like illness and has been on low-dose Sinemet.  She previously had been treated with antihypertensive medications but because of orthostatic hypotension medications have been discontinued.  Overall her lightheaded symptoms have improved but she does continue to note events especially if she is standing in the kitchen for any length of time or if she is on the toilet.  We talked about being seated when she is working in the kitchen is much as possible and doing positions slowly especially when she is on the toilet and especially during morning hours when she is first awakening.  We reviewed the recent event monitor that demonstrated no significant rhythm abnormality and echocardiogram June 2020 that reported normal systolic function with mild to moderate aortic insufficiency and mild to moderate mitral insufficiency.    Review of Systems:   General: WNL  Eyes: Visual Distubance  Ears/Nose/Throat: WNL  Lungs: WNL  Heart: Chest Pain, Fainting(Patient denies experiencing chest pain at this time.)  Stomach: Constipation  Bladder: WNL  Muscle/Joints: WNL  Skin: WNL  Nervous System: Dizziness  Mental Health: WNL     Blood: WNL      Problem List:    Patient Active Problem List   Diagnosis   ? S/P total knee arthroplasty   ? Essential hypertension with goal blood pressure less than 130/80   ? Acute blood loss as cause of postoperative anemia   ? Hyperlipidemia, unspecified hyperlipidemia type   ? Acquired hammer toe of left foot   ? Arthritis of left knee   ? Artificial knee joint present   ? Benign neoplasm   ? Cerebrovascular disease   ? Cheloid of skin   ? Dizziness   ? Inflammatory and toxic neuropathy (H)   ? Parasomnia   ? Solitary pulmonary nodule   ? Aortic valve insufficiency   ? Weakness   ? Vitamin B12 deficiency (non  anemic)   ? Generalized muscle weakness   ? Acute cystitis without hematuria   ? Acute metabolic encephalopathy   ? Delirium   ? Complex care coordination   ? Spinal stenosis of lumbar region without neurogenic claudication   ? Cognitive and behavioral changes   ? Sleep difficulties   ? Stress reaction with psychomotor agitation       Social History     Socioeconomic History   ? Marital status:      Spouse name: Not on file   ? Number of children: Not on file   ? Years of education: Not on file   ? Highest education level: Not on file   Occupational History   ? Not on file   Social Needs   ? Financial resource strain: Not on file   ? Food insecurity     Worry: Not on file     Inability: Not on file   ? Transportation needs     Medical: Not on file     Non-medical: Not on file   Tobacco Use   ? Smoking status: Never Smoker   ? Smokeless tobacco: Never Used   Substance and Sexual Activity   ? Alcohol use: No   ? Drug use: No   ? Sexual activity: Not on file   Lifestyle   ? Physical activity     Days per week: Not on file     Minutes per session: Not on file   ? Stress: Not on file   Relationships   ? Social connections     Talks on phone: Not on file     Gets together: Not on file     Attends Zoroastrianism service: Not on file     Active member of club or organization: Not on file     Attends meetings of clubs or organizations: Not on file     Relationship status: Not on file   ? Intimate partner violence     Fear of current or ex partner: Not on file     Emotionally abused: Not on file     Physically abused: Not on file     Forced sexual activity: Not on file   Other Topics Concern   ? Not on file   Social History Narrative   ? Not on file       No family history on file.    Current Outpatient Medications   Medication Sig Dispense Refill   ? acetaminophen (TYLENOL) 325 MG tablet Take 2 tablets (650 mg total) by mouth every 4 (four) hours as needed for pain or fever. (Patient taking differently: Take 325 mg by  mouth every 4 (four) hours as needed for pain or fever. )  0   ? carbidopa-levodopa (SINEMET)  mg per tablet Take 1 tablet by mouth 2 (two) times a day.      ? cholecalciferol, vitamin D3, 400 unit Tab Take 400 Units by mouth daily.     ? cyanocobalamin (VITAMIN B-12) 1000 MCG tablet Take 1,000 mcg by mouth daily.     ? fish oil-omega-3 fatty acids (FISH OIL) 300-1,000 mg capsule Take 1 g by mouth daily.     ? melatonin 3 mg Tab tablet Take 1 tablet (3 mg total) by mouth at bedtime as needed.  0   ? nitrofurantoin (MACRODANTIN) 50 MG capsule TAKE 1 CAPSULE BY MOUTH AT BEDTIME FOR INFECTION PREVENTION       No current facility-administered medications for this visit.        Objective:     /84 (Patient Site: Left Arm, Patient Position: Sitting, Cuff Size: Adult Regular)   Pulse 60   Resp 16   Wt 168 lb 9.6 oz (76.5 kg)   BMI 26.41 kg/m    168 lb 9.6 oz (76.5 kg)   [unfilled]  Wt Readings from Last 3 Encounters:   06/03/21 168 lb 9.6 oz (76.5 kg)   08/07/20 176 lb (79.8 kg)   06/25/20 176 lb 1.6 oz (79.9 kg)       Physical Exam:    GENERAL APPEARANCE: alert, no apparent distress  HEENT: no scleral icterus or xanthelasma  NECK: jugular venous pressure within normal limits, patient is examined in the wheelchair so exam is somewhat technically limited.  CHEST: symmetric, the lungs are clear to auscultation  CARDIOVASCULAR: regular rhythm without murmur, click, or gallop; no carotid bruits  Abdomen: No Organomegaly, masses, bruits, or tenderness. Bowels sounds are present      EXTREMITIES: no cyanosis, clubbing or edema    Cardiac Testing:  Reviewed By    Frank Roldan MD on 6/10/2020 15:42   Echo Complete  Order# [id number]  Reading physician: Dequan Leach MD Ordering physician: Frank Roldan MD Study date: 6/9/20   Performing Date Performing Department   Jun 9, 2020  CARDIAC TESTING [[id number]]   Patient Information    Patient Name   Alice Lawson MRN   [id number] Sex   Female   1   1940 (80 y.o.)   Indications    Dx: Syncope [R55 (ICD-10-CM)]   Summary    1. Normal left ventricular size and systolic performance with a visually estimated ejection fraction of 60-65%.   2. There is mild to moderate aortic insufficiency.   3. There is mild, to perhaps mild-moderate, mitral insufficiency.   4. Normal right ventricular size and systolic performance     MANUEL Monitor Hook-Up  Order# [id number]  Reading physician: Panda Samaniego MD Ordering physician: Frank Roldan MD Study date: 21   Patient Information    Patient Name   Alice Lawson MRN   [id number] Sex   Female  1   1940 (81 y.o.)   Order-Level Documents:    Scan on 2021 7:33 AM         Indications    Syncope   Conclusion    [facility]     MULTI-DAY PATIENT ACTIVATED MONITOR (MANUEL) REPORT     Results:    Indication for study: Evaluate for syncope, collapse    A 14-day monitor was done from 2021 through May 11, 2021    Baseline tracing showed sinus rhythm with normal electrocardiographic intervals; the average heart rate is 70 bpm and heart rate ranged between  bpm    No bradycardia pauses or AV block    No symptomatic events    Rare singular PVCs; but no  complex ventricular ectopy identified    Rare or absent atrial ectopy;  no complex atrial ectopy seen    Discussion    Normal multi day monitor    A cause of syncope is neither seen nor suggested on this recording        MUSE DIAGNOSIS  ECG 12 lead with MUSE, prior to first dose of antipsychotics.  Collected: 20 1027   Result status: Final   Resulting lab:  MUSE   Value: Normal sinus rhythm   Leftward axis   Early transition   Nonspecific ST abnormality   Abnormal ECG   When compared with ECG of 2020 10:56,   No significant change was found   Confirmed by CLARENCE QIUROGA, LES LOC:JN ([id number]) on 2020 1:49:42 PM         Lab Results:    Lab Results   Component Value Date     05/10/2021    K 4.6 05/10/2021    CL  106 05/10/2021    CO2 25 05/10/2021    BUN 18 05/10/2021    CREATININE 0.90 05/10/2021    CALCIUM 9.3 05/10/2021     Lab Results   Component Value Date    CHOL 204 (H) 08/12/2020    TRIG 77 08/12/2020    HDL 66 08/12/2020     BNP (pg/mL)   Date Value   06/18/2020 112     Creatinine (mg/dL)   Date Value   05/10/2021 0.90   08/12/2020 0.86   08/07/2020 0.90   07/23/2020 0.81     LDL Calculated (mg/dL)   Date Value   08/12/2020 123   08/25/2017 79   06/19/2017 140 (H)     Lab Results   Component Value Date    WBC 6.4 05/10/2021    HGB 13.1 05/10/2021    HCT 39.9 05/10/2021    MCV 97 05/10/2021     05/10/2021         This note has been dictated using voice recognition software. Any grammatical or context distortions are unintentional and inherent to the software.

## 2021-07-06 VITALS
BODY MASS INDEX: 26.41 KG/M2 | RESPIRATION RATE: 16 BRPM | DIASTOLIC BLOOD PRESSURE: 84 MMHG | SYSTOLIC BLOOD PRESSURE: 144 MMHG | HEART RATE: 60 BPM | WEIGHT: 168.6 LBS

## 2021-07-09 ENCOUNTER — HOSPITAL ENCOUNTER (OUTPATIENT)
Dept: MEDSURG UNIT | Facility: HOSPITAL | Age: 81
Setting detail: OBSERVATION
Discharge: HOME | End: 2021-07-11
Attending: HOSPITALIST | Admitting: EMERGENCY MEDICINE
Payer: COMMERCIAL

## 2021-07-09 DIAGNOSIS — R33.9 URINARY RETENTION: ICD-10-CM

## 2021-07-09 DIAGNOSIS — K59.01 SLOW TRANSIT CONSTIPATION: ICD-10-CM

## 2021-07-09 DIAGNOSIS — R41.0 CONFUSION: ICD-10-CM

## 2021-07-09 DIAGNOSIS — K56.41 FECAL IMPACTION (H): Primary | ICD-10-CM

## 2021-07-09 DIAGNOSIS — K56.41 FECAL IMPACTION IN RECTUM (H): ICD-10-CM

## 2021-07-09 LAB
ALBUMIN SERPL-MCNC: 4 G/DL (ref 3.5–5)
ALBUMIN UR-MCNC: NEGATIVE G/DL
ALP SERPL-CCNC: 58 U/L (ref 45–120)
ALT SERPL W P-5'-P-CCNC: <9 U/L (ref 0–45)
ANION GAP SERPL CALCULATED.3IONS-SCNC: 8 MMOL/L (ref 5–18)
APPEARANCE UR: CLEAR
AST SERPL W P-5'-P-CCNC: 15 U/L (ref 0–40)
BILIRUB SERPL-MCNC: 0.7 MG/DL (ref 0–1)
BILIRUB UR QL STRIP: NEGATIVE
BUN SERPL-MCNC: 12 MG/DL (ref 8–28)
CALCIUM SERPL-MCNC: 9.6 MG/DL (ref 8.5–10.5)
CHLORIDE BLD-SCNC: 101 MMOL/L (ref 98–107)
CO2 SERPL-SCNC: 25 MMOL/L (ref 22–31)
COLOR UR AUTO: COLORLESS
CREAT SERPL-MCNC: 0.89 MG/DL (ref 0.6–1.1)
ERYTHROCYTE [DISTWIDTH] IN BLOOD BY AUTOMATED COUNT: 12 % (ref 11–14.5)
GFR SERPL CREATININE-BSD FRML MDRD: >60 ML/MIN/1.73M2
GLUCOSE BLD-MCNC: 108 MG/DL (ref 70–125)
GLUCOSE UR STRIP-MCNC: NEGATIVE MG/DL
HCT VFR BLD AUTO: 40.5 % (ref 35–47)
HGB BLD-MCNC: 13.8 G/DL (ref 12–16)
HGB UR QL STRIP: NEGATIVE
KETONES UR STRIP-MCNC: NEGATIVE MG/DL
LACTATE SERPL-SCNC: 0.7 MMOL/L (ref 0.7–2)
LEUKOCYTE ESTERASE UR QL STRIP: NEGATIVE
LIPASE SERPL-CCNC: 14 U/L (ref 0–52)
MCH RBC QN AUTO: 32.3 PG (ref 27–34)
MCHC RBC AUTO-ENTMCNC: 34.1 G/DL (ref 32–36)
MCV RBC AUTO: 95 FL (ref 80–100)
NITRATE UR QL: NEGATIVE
PH UR STRIP: 6 [PH] (ref 5–8)
PLATELET # BLD AUTO: 198 THOU/UL (ref 140–440)
PMV BLD AUTO: 10.7 FL (ref 8.5–12.5)
POTASSIUM BLD-SCNC: 4.7 MMOL/L (ref 3.5–5)
PROT SERPL-MCNC: 7.3 G/DL (ref 6–8)
RBC # BLD AUTO: 4.27 MILL/UL (ref 3.8–5.4)
SARS-COV-2 PCR RESULT-HE - HISTORICAL: NEGATIVE
SODIUM SERPL-SCNC: 134 MMOL/L (ref 136–145)
SP GR UR STRIP: 1.01 (ref 1–1.03)
UROBILINOGEN UR STRIP-ACNC: NORMAL
WBC: 6.3 THOU/UL (ref 4–11)

## 2021-07-09 PROCEDURE — 74176 CT ABD & PELVIS W/O CONTRAST: CPT

## 2021-07-09 PROCEDURE — 71046 X-RAY EXAM CHEST 2 VIEWS: CPT

## 2021-07-09 PROCEDURE — 83605 ASSAY OF LACTIC ACID: CPT

## 2021-07-09 PROCEDURE — 81003 URINALYSIS AUTO W/O SCOPE: CPT

## 2021-07-09 PROCEDURE — G0378 HOSPITAL OBSERVATION PER HR: HCPCS

## 2021-07-09 PROCEDURE — C9803 HOPD COVID-19 SPEC COLLECT: HCPCS

## 2021-07-09 PROCEDURE — U0002 COVID-19 LAB TEST NON-CDC: HCPCS

## 2021-07-09 PROCEDURE — 70450 CT HEAD/BRAIN W/O DYE: CPT

## 2021-07-09 PROCEDURE — 999N001212 HC REV CODE 9999 CHARGE CONVERSION OPNP: Mod: GY

## 2021-07-09 PROCEDURE — 96372 THER/PROPH/DIAG INJ SC/IM: CPT

## 2021-07-09 PROCEDURE — 83690 ASSAY OF LIPASE: CPT

## 2021-07-09 PROCEDURE — A9270 NON-COVERED ITEM OR SERVICE: HCPCS | Mod: GY

## 2021-07-09 PROCEDURE — 85027 COMPLETE CBC AUTOMATED: CPT

## 2021-07-09 PROCEDURE — 99285 EMERGENCY DEPT VISIT HI MDM: CPT | Mod: 25

## 2021-07-09 PROCEDURE — 51798 US URINE CAPACITY MEASURE: CPT

## 2021-07-09 PROCEDURE — 80053 COMPREHEN METABOLIC PANEL: CPT

## 2021-07-09 ASSESSMENT — MIFFLIN-ST. JEOR
SCORE: 1241.53
SCORE: 1239.71

## 2021-07-09 NOTE — ED NOTES
ED Notes by Irineo Hunter RN at 7/9/2021 12:28 PM     Author: Dale, Irineo, RN Service: -- Author Type: Registered Nurse    Filed: 7/9/2021 12:35 PM Date of Service: 7/9/2021 12:28 PM Status: Signed    : Irineo Hunter RN (Registered Nurse)            Events: constipation, pt states last bm 2 weeks ago     Neuro: Oriented x3 family states confusion past few days no bm 2 weeks, abd soft, non tender hypoactive BS, having pressure in rectum  VSS: VSS on RA  Resp: Clear    Cardio: WDL.   GI/: Voiding adequately per patient, having retention  Pain/Discomfort:abd discomfort  Activity: Up independently with walker at home recent weakness  Nutrition: Decrease appetite past 2-3 months,

## 2021-07-09 NOTE — PLAN OF CARE
"Plan of Care by Paulina Mayer RN at 7/9/2021  4:37 PM     Author: Paulina Mayer RN Service: -- Author Type: Registered Nurse    Filed: 7/9/2021  4:49 PM Date of Service: 7/9/2021  4:37 PM Status: Addendum    : Paulina Mayer RN (Registered Nurse)    Related Notes: Original Note by Paulina Mayer RN (Registered Nurse) filed at 7/9/2021  4:41 PM       1625- RN administered soap suds enema.  Pt tolerated it well.        1635-RN assisted pt to BSC.   Pt stated, \"I feel dizzy\", Pt became rigid and passed out while on commode.  RN was with pt the whole time and pt leaned against RN.  Pt remained safe the whole time.   With in seconds, Pt able to talk to nurse and state her birth date and how many children she has.  Pt assisted back bed by aide and rn.    RN updated Dr. Boyce.    Pt did have small liquid brown stool.        "

## 2021-07-09 NOTE — ED PROVIDER NOTES
ED Provider Notes by Meseret Ty PA-C at 7/9/2021  9:12 AM     Author: Meseret Ty PA-C Service: Emergency Medicine Author Type: Physician Assistant    Filed: 7/9/2021 12:53 PM Date of Service: 7/9/2021  9:12 AM Status: Signed    : Meseret Ty PA-C (Physician Assistant)       Emergency Department Encounter   NAME: Alice Lawson ; AGE: 81 y.o. female ; YOB: 1940 ; MRN: [id number] ; EVALUATION DATE & TIME: 7/9/2021  9:06 AM ; PCP: Chelle Casanova MD   ED PROVIDER: Meseret Ty PA-C    Chief Complaint   Patient presents with   ? Constipation       Medical Decision Making & Final Diagnosis     1. Fecal impaction in rectum (H)    2. Slow transit constipation    3. Confusion    4. Urinary retention         Alice Lawson is a 81 y.o. female with a relevant PMH of hypertension, hyperlipidemia, chronic constipation who presents to the ED by private vehicle with her daughter for evaluation of constipation.  The patient presents to the emergency department for evaluation in the care of her daughter for 2 weeks of persistent constipation.  She passed a tiny amount of hard stool yesterday.  She is not having any associated abdominal pain, and here in the ED she is afebrile and vitally stable.  Her abdomen is soft without distention and she has normal bowel sounds, making my suspicion for true obstruction lower.  She has some mild tenderness over the left side of her abdomen diffusely though no rebound, guarding, or evidence of peritonitis.  Given her age and her history, do feel that CT imaging is appropriate to further assess.  Daughter also has concerns about having increased disorientation over the past several weeks.  She was standing in her room with her door open today, and questioning where she was when daughter arrived.  She is unable to provide daughter's name here in the ED though is otherwise oriented to self, time, and place.  She has not had any recent falls or trauma to the  head and is not anticoagulated.  Her neurologic exam is otherwise intact and her NIHSS is 0. Daughter would like head CT to assess which is ordered.  Patient was placed on ciprofloxacin, 10-day course, for a potential urinary tract infection, though her urine culture was negative.  Possible that this is also messing with her sensorium.  Will reassess blood work and urine today as well.    CT head shows no evidence of hemorrhage, hydrocephalus, or mass.  Unchanged mild diffuse cerebral parenchymal volume loss.  Urine is completely unremarkable.  Chest x-ray negative.  No evidence of infection.  No concerning metabolic derangements, TASHIA, or evidence of dehydration.  No anemia.  No fevers, abnormal vitals, or leukocytosis to suggest Sirs.  No pain out of proportion to exam, bloody stools, peritoneal abdomen or elevated lactate to suggest ischemic bowel.  CT shows a large amount of stool throughout the colon and within the rectum consistent with constipation and possible fecal impaction.  There is no evidence of bowel inflammation, infectious process, visceral perforation or abscess.  CT also does show fullness of the renal collecting system and a distended urinary bladder.  Reassessed the patient, and she has urinated multiple times here in the ED, although does not feel that she is fully emptying.  Bladder scan obtained and showed ~280 ccs in the urine.  Likely that her constipation is causing her urinary obstruction and we will place a Camargo catheter until we can clear her stool to help with urination.  Soft side enema ordered. Her confusion could be due to her recent ciprofloxacin prescription versus urinary retention versus dementia.  Will admit to observation medicine for treatment of her constipation/fecal impaction, urinary obstruction, continued monitoring.  Dr. Boyce has accepted the patient for admission.  Patient and daughter comfortable with the plan.  She was admitted to the floor vitally stable in good  condition.      ED Course   9:30 AM I met and introduced myself to the patient. I gathered initial history and performed my physical exam. We discussed plan for initial workup. PPE worn including surgical mask, gloves.  12:17 PM I have staffed the patient with Dr. Ledezma, ED MD, who has evaluated the patient and agrees with all aspects of today's care.   12:20 PM  I rechecked and updated the patient on lab and imaging results.  12:23 PM I paged hospitalist.   12:25 PM Dr. Ledezma met with patient.   12:44 PM Dr. Ledezma spoke with Dr. Boyce, who accepts the patient for medical obs admission.       MEDICATIONS GIVEN IN THE EMERGENCY:   Medications - No data to display   NEW PRESCRIPTIONS STARTED AT TODAY'S ER VISIT:  Current Discharge Medication List      CONTINUE these medications which have NOT CHANGED    Details   carbidopa-levodopa (SINEMET)  mg per tablet Take 1 tablet by mouth 2 (two) times a day.       cholecalciferol, vitamin D3, 5,000 unit Tab Take 5,000 Units by mouth 2 (two) times a day.      cyanocobalamin (VITAMIN B-12) 1000 MCG tablet Take 1,000 mcg by mouth every other day.       estradioL (ESTRACE) 0.01 % (0.1 mg/gram) vaginal cream Insert 2 g into the vagina 3 (three) times a week. On Monday/Wednesday/Friday      fish oil-omega-3 fatty acids (FISH OIL) 300-1,000 mg capsule Take 1 g by mouth daily.      nitrofurantoin (MACRODANTIN) 50 MG capsule Take 50 mg by mouth daily.       peg 400-propylene glycol PF (SYSTANE) 0.4-0.3 % Dpet Administer 1 drop to both eyes 4 (four) times a day as needed (dry eyes).      polyethylene glycol (MIRALAX) 17 gram packet Take 17 g by mouth 2 (two) times a day.      tamsulosin (FLOMAX) 0.4 mg cap Take 0.4 mg by mouth Daily after breakfast.            =================================================================   History   Patient information was obtained from: Patient and daughter    Use of Intrepreter: N/A  Alice RAM Renny is a 81 y.o. female with a relevant PMH  "of hypertension, hyperlipidemia, chronic constipation who presents to the ED by private vehicle with her daughter for evaluation of constipation. Patient reports that she has not had a bowel movement for 2 weeks.    She states that she has chronic constipation and has has this problem before and was prescribed Miralax. However, she accidentally took too much Miralax one day and had really bad diarrhea, which has made her afraid to take Miralax since. Patient's daughter reports that she has only taken half doses of her Miralax. Yesterday, the patient's daughter was able to convince her to take a full dose of Miralax, which is 2 pills twice daily, and reports that the patient had a bowel movement that resembled \"agustin\". She has taken Miralax again today, but reports no bowel movement. She reports that she is able to pass some gas occasionally.    The patient's daughter also reports that she has noticed the patient has been disorientated over the past few weeks. She reports that she recently moved to assisted living on 03/01 (5 months ago) and is usually able to live mostly independently. However, her daugter states that the patient recently asked that she needed help going to the bathroom because she did not know where it was. She states that her mother's sleeplessness and transition into her assisted living facility may be the cause of her confusion.    Additionally, the patient's daughter states that the patient was initially diagnosed with Parkinson's disease, but was later reassessed by a different neurologist who told her that she had a \"Parkinson's mimic-er,\" stating that she only has some Parkinson's symptoms, partially due to her spinal stenosis. She is taking some medications for this, but not as many as if she had Parkinson's.    The patient also went to the [facility] ED 1 year ago because she got \"really sick\" due to dehydration, for which she was admitted for 1 week. After discharge, she went into " "transient care for 1 week and improved afterwards.    Patient denies history of bowel obstruction, impaction. Patient denies abdominal pain, fever, chills, appetite changes, vomiting.      ______________________________________________________________________  Past Medical History:   Diagnosis Date   ? Arthritis    ? Cancer (H)    ? Hypertension    ? Melanoma (H)        Past Surgical History:   Procedure Laterality Date   ? CATARACT EXTRACTION Bilateral     2014   ? KNEE ARTHROSCOPY Bilateral    ? NECK SURGERY     ? REPLACEMENT TOTAL KNEE Left 1/5/2017    Procedure: LEFT TOTAL KNEE ARTHROPLASTY COMPUTER ASSIST;  Surgeon: Micah Gallagher MD;  Location: [facility] OR;  Service:        History reviewed. No pertinent family history.    Social History     Tobacco Use   ? Smoking status: Never Smoker   ? Smokeless tobacco: Never Used   Substance Use Topics   ? Alcohol use: No   ? Drug use: No       REVIEW OF SYSTEMS:    Review of Systems   Constitutional: Negative for appetite change, chills and fever.   Respiratory: Negative for cough and shortness of breath.    Cardiovascular: Negative for chest pain.   Gastrointestinal: Negative for abdominal pain and vomiting.   Genitourinary: Negative for dysuria and hematuria.   Musculoskeletal: Negative for back pain.   Neurological: Positive for weakness (generalized). Negative for dizziness, syncope and headaches.   All other systems reviewed and are negative.        Physical Exam   /82   Pulse 74   Temp 98.4  F (36.9  C) (Oral)   Resp 16   Ht 5' 7\" (1.702 m)   Wt 164 lb (74.4 kg)   SpO2 100%   BMI 25.69 kg/m      Physical Exam   Constitutional: She appears well-developed and well-nourished. No distress.   HENT:   Head: Normocephalic.   Mouth/Throat: Oropharynx is clear and moist.   Eyes: Pupils are equal, round, and reactive to light. Conjunctivae and EOM are normal.   Neck: Normal range of motion. Neck supple.   Cardiovascular: Normal rate, regular rhythm, " normal heart sounds and intact distal pulses. Exam reveals no gallop and no friction rub.   No murmur heard.  Pulmonary/Chest: Effort normal and breath sounds normal. No respiratory distress. She has no wheezes. She has no rales.   Abdominal: Soft. Bowel sounds are normal. She exhibits no distension and no mass. There is abdominal tenderness (mild left sided tenderness). There is no rebound and no guarding.   No CVA tenderness.    Neurological: She is alert. She has normal strength. No sensory deficit. GCS eye subscore is 4. GCS verbal subscore is 5. GCS motor subscore is 6.   CN 3-12 intact.  Negative pronator drift.  Heel/shin intact.  Normal speech.  No facial asymmetry.  No visual field cuts.  Oriented to self, place, and time. Unable to provide daughter's name in the room.    Skin: Skin is warm and dry. She is not diaphoretic.   Nursing note and vitals reviewed.      Lab Work (Reviewed and Interpreted):   Labs Reviewed   COMPREHENSIVE METABOLIC PANEL - Abnormal; Notable for the following components:       Result Value    Sodium 134 (*)     All other components within normal limits    Narrative:     Fasting Glucose reference range is 70-99 mg/dL per  American Diabetes Association (ADA) guidelines.   HM2(CBC W/O DIFFERENTIAL) - Normal   LIPASE - Normal   LACTIC ACID - Normal   URINALYSIS,MACRO REFLEX MICRO, UC IF INDICATED - Normal    Narrative:     Microscopic not indicated  UC not indicated   SARS-COV-2 (COVID-19)-PCR       Imaging (Reviewed and Interpreted):   Ct Abdomen Pelvis Without Oral Without Iv Contrast    Result Date: 7/9/2021  EXAM: CT ABDOMEN PELVIS WO ORAL WO IV CONTRAST LOCATION: [facility] DATE/TIME: 7/9/2021 11:46 AM INDICATION: constipation, pain COMPARISON: 10/16/2018 TECHNIQUE: CT scan of the abdomen and pelvis was performed without oral or IV contrast. Multiplanar reformats were obtained. Dose reduction techniques were used. CONTRAST: None. FINDINGS: LOWER CHEST:  Coronary artery disease. HEPATOBILIARY:  Partially calcified cystic lesion in the posterior right hepatic lobe, unchanged and most likely benign. Again seen is a 1.6 cm lesion in the lateral left hepatic lobe which has not changed and is presumably a cyst. No follow-up is required. PANCREAS: Normal. SPLEEN: Normal. ADRENAL GLANDS: A left adrenal adenoma does not require follow-up. KIDNEY/BLADDER: A cystic lesion in the right kidney does not require follow-up. There is mild right and left collecting system fullness extending to the ureterovesical junction. No renal or ureteral calculi. The urinary bladder is distended. BOWEL: Normal stomach. Normal caliber of the small bowel. The appendix is not discretely visualized. There is a large amount of stool throughout the colon, with a large amount of stool in the rectum. LYMPH NODES: Normal. VASCULATURE: Atherosclerotic disease. PELVIC ORGANS: Normal. MUSCULOSKELETAL: Normal.     1.  Large amount of stool throughout the colon with a large amount of stool in the rectum. 2.  Fullness of the renal collecting systems may be transient. The urinary bladder is distended. No renal or ureteral calculi identified.     Xr Chest 2 Views    Result Date: 7/9/2021  EXAM: XR CHEST 2 VIEWS LOCATION: [facility] DATE/TIME: 7/9/2021 11:48 AM INDICATION: confusion COMPARISON: 6/18/2020     Negative chest.    Ct Head Without Contrast    Result Date: 7/9/2021  EXAM: CT HEAD WO CONTRAST LOCATION: [facility] DATE/TIME: 7/9/2021 11:39 AM INDICATION: confusion COMPARISON: Brain MRI 06/22/2020 TECHNIQUE: Routine CT Head without IV contrast. Multiplanar reformats. Dose reduction techniques were used. FINDINGS: INTRACRANIAL CONTENTS: No intracranial hemorrhage. Mild diffuse cerebral parenchymal volume loss. No midline shift. The basilar cisterns are patent. Mild periventricular and scattered foci of deep white matter hypodensities involving both  cerebral hemispheres. No CT evidence for an acute infarct. No cerebellar tonsillar ectopia. Stable ventricular size. The patient's known chronic lacunae in the right cerebellar hemisphere is better seen on the prior brain MRI. VISUALIZED ORBITS/SINUSES/MASTOIDS: Prior bilateral cataract surgery. Visualized portions of the orbits are otherwise unremarkable. No paranasal sinus mucosal disease. No middle ear or mastoid effusion. BONES/SOFT TISSUES: No acute abnormality.     1.  No intracranial hemorrhage, mass lesions, hydrocephalus or CT evidence for an acute infarct. 2.  Mild diffuse cerebral parenchymal volume loss. Presumed chronic hypertensive/microvascular ischemic white matter changes.    I, Reid Michaud, am serving as a scribe to document services personally performed by Meseret Ty PA-C, based on my observation and the provider's statements to me. I, Meseret Ty PA-C attest that Reid Michaud is acting in a scribe capacity, has observed my performance of the services and has documented them in accordance with my direction.     Meseret Ty PA-C   Emergency Medicine   [facility] EMERGENCY DEPARTMENT  [address]  [facility] [address]  Dept: [phone]  Loc: [phone]      Meseret Ty PA-C  07/09/21 [id number]

## 2021-07-09 NOTE — CONSULTS
Consults by Gisel Jimenez RN at 7/9/2021  1:28 PM     Author: Gisel Jimenez RN Service: -- Author Type: RN, Care Manager    Filed: 7/9/2021  1:32 PM Date of Service: 7/9/2021  1:28 PM Status: Signed    : Gisel Jimenez RN (RN, Care Manager)     Consult Orders    1. Inpatient consult to Care Management / Social Work [[id number]] ordered by Nany Boyce MD at 07/09/21 1327           Summary: Patient's goal is to return to her assisted living at discharge.      Care Management Initial Consult    General Information:  Patient's communication limitations: none  Level of Orientation: A & O x 3     Advance Care Planning: Patient does not have advance directive   No    Living Environment:   People in home/Living arrangements: Alone  Current residence:  Assisted living [facility]    Family/Social Support:  Care provided by/ Primary Caregiver: Self staff at assisted living, immediate family  Provides care for someone: No  Description of Support System: Children, Family members     Lifestyle & Psychosocial Needs:        Socioeconomic History   ? Marital status:      Spouse name: Not on file   ? Number of children: Not on file   ? Years of education: Not on file   ? Highest education level: Not on file     Tobacco Use   ? Smoking status: Never Smoker   ? Smokeless tobacco: Never Used   Substance and Sexual Activity   ? Alcohol use: No   ? Drug use: No       Functional Status:  Prior to admission ADL limits: Yes transportation, meal preparation, laundry, housekeeping    Current Resources:   Skilled Home Care Services: (No)  Community Resources: Other (see comment)(Assisted Living)  Equipment currently used at home:    Supplies currently used at home:      Employment:  Employment Status: RetiredUnknown No    Financial/Environmental Concerns/Barriers to Discharge:        Values/Beliefs:  Spiritual, Cultural Beliefs, Taoism Practices, Values that affect care: no              Additional  Information:  Writer met with patient to review role of observation services, discuss goals of care and assess need for any possible services at discharge. Patient was alert, oriented and engaged in the conversation. Patient is a resident of [facility]. She states she does not have nursing service there. Attempted to call nurse at [facility] ([phone]) but there was no answer. She does have advance directives on the chart indicating that her son Agapito is her primary healthcare agent and her daughter Raiza is first alternate. Her daughter Raiza is here in the ED with patient but has stepped out of the room. This document appears to have been validated. Her goal is to return to her Assisted Living apartment at discharge, family transport.     Gisel Jimenez RN     Abbreviation  Code:  [facility] Wheelchair ([facility] WC), [facility] Stretcher ([facility] STR), Patient (pt), Transitional Care Unit (TCU), Skilled Nursing Facility (SNF), Long Term Care (LTC), Assisted Living (SAUD or AL), Acute Rehab (AR), Care Management (CM), Physical Therapy (PT), Occupational Therapy (OT), Speech Therapy (ST), Respiratory Therapy (RT).

## 2021-07-09 NOTE — H&P
H&P by Nany Boyce MD at 2021  1:50 PM     Author: Nany Boyce MD Service: Hospitalist Author Type: Physician    Filed: 2021  2:39 PM Date of Service: 2021  1:50 PM Status: Signed    : Nany Boyce MD (Physician)       Admission History and Physical   Alice Lawson,  1940, MRN [id number]    [facility]  Slow transit constipation [K59.01]  Urinary retention [R33.9]  Confusion [R41.0]  Fecal impaction in rectum (H) [K56.41]    PCP: Chelle Casanova MD, [phone]   Code status:  Prior       Extended Emergency Contact Information  Primary Emergency Contact: Agapito Lawson  Home Phone: [phone]  Mobile Phone: [phone]  Relation: Child  Secondary Emergency Contact: Raiza Lawson   [facility]  Home Phone: [phone]  Mobile Phone: [phone]  Relation: Child       Date of Service: 2021     Assessment and Plan:  Alice Lawson is a 81 y.o. female with history of HTN, melanoma, Parkinson's, recurrent UTIs, mild cognitive impairment presents for evaluation of altered mental status and lack of bowel movement    #Fecal impaction  Patient with no bowel movement several days, CT showing large amount of stool in the rectum  Try soapsuds enema.  Okay to try suppository if this is not effective  Continue daily MiraLAX and add twice daily stool softener  If still no bowel movement by tomorrow, would proceed with Gastrografin enema    #Urine retention  CT showing dilation of the collecting system bilaterally, bladder scan with 280 cc, history of urine retention, Camargo catheter placed in the ER  Suspect urine retention secondary to large fecal impaction as above  We will continue Camargo catheter at least until she is stooling well, void trial before discharge if there is time otherwise she can follow-up with urology  Okay to continue home Flomax    #Acute encephalopathy  #Underlying mild cognitive impairment/early dementia  #Parkinsonism  Suspect  secondary to urinary/bowel issues as above.  Could have been exacerbated by medication ciprofloxacin too.  She is now off that medication  Supportive care  PT/OT/OT cognitive eval  Continue home Sinemet    #Recent UTI  Completed course of Cipro recently despite negative urine cultrue  Okay to continue home prophylactic nitrofurantoin    #Hyponatremia  Mild  Can recheck in the morning    #HTN  By history  BP elevated here  Not on meds at home  IV hydralazine for very elevated BP    Last COVID-19 result/vaccination status: 7/9 neg    FENGI: Regular diet    VTE prophylaxis: Moderate risk. SQH  Disposition: Obs  Length of Stay: Anticipate less than 2 midnight hospitalization  Code status: DNR/DNI.  Discussed and confirmed with patient and her daughter and we reviewed her advance directive    Chief Complaint: Altered mental status, constipation    HPI:      Alice Lawson is a 81 y.o. female here with above complaints  She has not had any bowel movement in several days and was feeling fullness and need to go but nothing will come out.  Saw her primary doctor for this in mid June and was prescribed MiraLAX but has been very wary of taking this medication due to history of loose stools in the past when she would take medication, daughter thinks this was Metamucil rather than MiraLAX however patient thought it was the same thing.  Patient started increasing her MiraLAX dose to no avail.  She has not tried anything from below such as a suppository and never has in the past.  Constipation has been a long-term problem for her.  Patient denies any urinary symptoms.  Notes her appetite is somewhat poor but this is chronic.  Has been passing some gas though perhaps not as much.  Big reason family brought her in today is that this morning when her daughter went to visit her, patient was asking questions that made it sound like she did not realize she was inside her own apartment.  She was asking when she had moved there etc.   Confusion has been a longstanding problem for the patient and seems to be getting worse.  The most clear patient's mentation has been in the recent past was when she was on an antibiotic, for this reason she was recently restarted on antibiotics despite a negative urine culture, but it did not help this time.    Medical History  Reviewed by myself on chart review  Active Ambulatory (Non-Hospital) Problems    Diagnosis   ? Cognitive and behavioral changes   ? Sleep difficulties   ? Stress reaction with psychomotor agitation   ? Delirium   ? Complex care coordination   ? Spinal stenosis of lumbar region without neurogenic claudication   ? Acute metabolic encephalopathy   ? Acute cystitis without hematuria   ? Weakness   ? Vitamin B12 deficiency (non anemic)   ? Generalized muscle weakness   ? Aortic valve insufficiency   ? Acquired hammer toe of left foot   ? Arthritis of left knee   ? Artificial knee joint present   ? Benign neoplasm   ? Cerebrovascular disease   ? Cheloid of skin   ? Dizziness   ? Inflammatory and toxic neuropathy (H)   ? Parasomnia   ? Solitary pulmonary nodule   ? Essential hypertension with goal blood pressure less than 130/80   ? Acute blood loss as cause of postoperative anemia   ? Hyperlipidemia, unspecified hyperlipidemia type   ? S/P total knee arthroplasty     Past Medical History:   Diagnosis Date   ? Arthritis    ? Cancer (H)    ? Hypertension    ? Melanoma (H)      Surgical History  Reviewed by myself on chart review  Past Surgical History:   Procedure Laterality Date   ? CATARACT EXTRACTION Bilateral     2014   ? KNEE ARTHROSCOPY Bilateral    ? NECK SURGERY     ? REPLACEMENT TOTAL KNEE Left 1/5/2017    Procedure: LEFT TOTAL KNEE ARTHROPLASTY COMPUTER ASSIST;  Surgeon: Micah Gallagher MD;  Location: [facility] OR;  Service:      Allergies  Reviewed by myself on chart review  Allergies   Allergen Reactions   ? Hydrochlorothiazide Other (See Comments)     Foggy feeling (electrolyte  "imbalance and ended up in hospital)   ? Rosuvastatin      Other reaction(s): *Unknown, caused abd wall pain        Prior to Admission Medications   Medications Prior to Admission   Medication Sig Dispense Refill Last Dose   ? carbidopa-levodopa (SINEMET)  mg per tablet Take 1 tablet by mouth 2 (two) times a day.    7/9/2021 at 0800   ? cholecalciferol, vitamin D3, 5,000 unit Tab Take 5,000 Units by mouth 2 (two) times a day.   7/9/2021 at am   ? cyanocobalamin (VITAMIN B-12) 1000 MCG tablet Take 1,000 mcg by mouth every other day.    7/8/2021 or 7/9/2021   ? estradioL (ESTRACE) 0.01 % (0.1 mg/gram) vaginal cream Insert 2 g into the vagina 3 (three) times a week. On Monday/Wednesday/Friday 7/7/2021   ? fish oil-omega-3 fatty acids (FISH OIL) 300-1,000 mg capsule Take 1 g by mouth daily.   7/9/2021 at am   ? nitrofurantoin (MACRODANTIN) 50 MG capsule Take 50 mg by mouth daily.    7/8/2021 at 1600   ? peg 400-propylene glycol PF (SYSTANE) 0.4-0.3 % Dpet Administer 1 drop to both eyes 4 (four) times a day as needed (dry eyes).      ? polyethylene glycol (MIRALAX) 17 gram packet Take 17 g by mouth 2 (two) times a day.   7/9/2021 at am   ? tamsulosin (FLOMAX) 0.4 mg cap Take 0.4 mg by mouth Daily after breakfast.   7/9/2021 at 0800       Social History  Reviewed by myself with patient  She reports that she has never smoked. She has never used smokeless tobacco. She reports that she does not drink alcohol or use drugs.      Family History  Reviewed by myself on chart review  family history includes Coronary artery disease in her mother; Diabetes in her sister; Hypertension in her mother.    Review Of Systems: 12-point ROS negative, except as noted in HPI    Physical Exam:  /82   Pulse 74   Temp 98.4  F (36.9  C) (Oral)   Resp 16   Ht 5' 7\" (1.702 m)   Wt 164 lb (74.4 kg)   SpO2 100%   BMI 25.69 kg/m    Body mass index is 25.69 kg/m .    General: in no apparent distress, non-toxic and alert female lying " in hospital bed oriented to person and place  HEENT: Head normocephalic atraumatic, oral mucosa moist. Sclerae anicteric  CV: Regular rhythm, normal rate, no murmurs  Resp: No wheezes, no rales or rhonchi, no focal consolidations  GI: Belly soft, nondistended, nontender, bowel sounds hypoactive  Skin: No rashes or lesions  Extremities: No peripheral edema  Psych: Normal affect, mood dysthymic  Neuro: CNII-XII grossly intact, moving all 4 extremities    Results for orders placed or performed during the hospital encounter of 07/09/21   HM2 (CBC W/O DIFF)   Result Value Ref Range    WBC 6.3 4.0 - 11.0 thou/uL    RBC 4.27 3.80 - 5.40 mill/uL    Hemoglobin 13.8 12.0 - 16.0 g/dL    Hematocrit 40.5 35.0 - 47.0 %    MCV 95 80 - 100 fL    MCH 32.3 27.0 - 34.0 pg    MCHC 34.1 32.0 - 36.0 g/dL    RDW 12.0 11.0 - 14.5 %    Platelets 198 140 - 440 thou/uL    MPV 10.7 8.5 - 12.5 fL   Comprehensive Metabolic Panel   Result Value Ref Range    Sodium 134 (L) 136 - 145 mmol/L    Potassium 4.7 3.5 - 5.0 mmol/L    Chloride 101 98 - 107 mmol/L    CO2 25 22 - 31 mmol/L    Anion Gap, Calculation 8 5 - 18 mmol/L    Glucose 108 70 - 125 mg/dL    BUN 12 8 - 28 mg/dL    Creatinine 0.89 0.60 - 1.10 mg/dL    GFR MDRD Af Amer >60 >60 mL/min/1.73m2    GFR MDRD Non Af Amer >60 >60 mL/min/1.73m2    Bilirubin, Total 0.7 0.0 - 1.0 mg/dL    Calcium 9.6 8.5 - 10.5 mg/dL    Protein, Total 7.3 6.0 - 8.0 g/dL    Albumin 4.0 3.5 - 5.0 g/dL    Alkaline Phosphatase 58 45 - 120 U/L    AST 15 0 - 40 U/L    ALT <9 0 - 45 U/L   Lipase   Result Value Ref Range    Lipase 14 0 - 52 U/L   Lactic Acid   Result Value Ref Range    Lactic Acid 0.7 0.7 - 2.0 mmol/L   Urinalysis-UC if Indicated   Result Value Ref Range    Color, UA Colorless Light Yellow, Yellow    Clarity, UA Clear Clear    Glucose, UA Negative Negative    Protein, UA Negative Negative    Bilirubin, UA Negative Negative    Urobilinogen, UA <2.0 mg/dL <2.0 mg/dL    pH, UA 6.0 5.0 - 8.0    Blood, UA  Negative Negative    Ketones, UA Negative Negative    Nitrite, UA Negative Negative    Leukocytes, UA Negative Negative    Specific Gravity, UA 1.007 1.001 - 1.030     Ct Abdomen Pelvis Without Oral Without Iv Contrast    Result Date: 7/9/2021  EXAM: CT ABDOMEN PELVIS WO ORAL WO IV CONTRAST LOCATION: [facility] DATE/TIME: 7/9/2021 11:46 AM INDICATION: constipation, pain COMPARISON: 10/16/2018 TECHNIQUE: CT scan of the abdomen and pelvis was performed without oral or IV contrast. Multiplanar reformats were obtained. Dose reduction techniques were used. CONTRAST: None. FINDINGS: LOWER CHEST: Coronary artery disease. HEPATOBILIARY:  Partially calcified cystic lesion in the posterior right hepatic lobe, unchanged and most likely benign. Again seen is a 1.6 cm lesion in the lateral left hepatic lobe which has not changed and is presumably a cyst. No follow-up is required. PANCREAS: Normal. SPLEEN: Normal. ADRENAL GLANDS: A left adrenal adenoma does not require follow-up. KIDNEY/BLADDER: A cystic lesion in the right kidney does not require follow-up. There is mild right and left collecting system fullness extending to the ureterovesical junction. No renal or ureteral calculi. The urinary bladder is distended. BOWEL: Normal stomach. Normal caliber of the small bowel. The appendix is not discretely visualized. There is a large amount of stool throughout the colon, with a large amount of stool in the rectum. LYMPH NODES: Normal. VASCULATURE: Atherosclerotic disease. PELVIC ORGANS: Normal. MUSCULOSKELETAL: Normal.     1.  Large amount of stool throughout the colon with a large amount of stool in the rectum. 2.  Fullness of the renal collecting systems may be transient. The urinary bladder is distended. No renal or ureteral calculi identified.     Xr Chest 2 Views    Result Date: 7/9/2021  EXAM: XR CHEST 2 VIEWS LOCATION: [facility] DATE/TIME: 7/9/2021 11:48 AM INDICATION:  confusion COMPARISON: 6/18/2020     Negative chest.    Ct Head Without Contrast    Result Date: 7/9/2021  EXAM: CT HEAD WO CONTRAST LOCATION: [facility] DATE/TIME: 7/9/2021 11:39 AM INDICATION: confusion COMPARISON: Brain MRI 06/22/2020 TECHNIQUE: Routine CT Head without IV contrast. Multiplanar reformats. Dose reduction techniques were used. FINDINGS: INTRACRANIAL CONTENTS: No intracranial hemorrhage. Mild diffuse cerebral parenchymal volume loss. No midline shift. The basilar cisterns are patent. Mild periventricular and scattered foci of deep white matter hypodensities involving both cerebral hemispheres. No CT evidence for an acute infarct. No cerebellar tonsillar ectopia. Stable ventricular size. The patient's known chronic lacunae in the right cerebellar hemisphere is better seen on the prior brain MRI. VISUALIZED ORBITS/SINUSES/MASTOIDS: Prior bilateral cataract surgery. Visualized portions of the orbits are otherwise unremarkable. No paranasal sinus mucosal disease. No middle ear or mastoid effusion. BONES/SOFT TISSUES: No acute abnormality.     1.  No intracranial hemorrhage, mass lesions, hydrocephalus or CT evidence for an acute infarct. 2.  Mild diffuse cerebral parenchymal volume loss. Presumed chronic hypertensive/microvascular ischemic white matter changes.    EKG: no new tracing obtained    Pertinent Labs/EKG/XRAY Reviewed  Social History, Family History, PMH, PSH, Medications and Allergies reviewed.  Total time: 70 minutes with >50% time spent with coordination of care and counseling reviewing plan of care with patient and family    Nany Boyce MD  [facility] Medicine Service

## 2021-07-09 NOTE — PLAN OF CARE
Plan of Care by Lavelle Khanna RN at 7/9/2021  3:33 PM     Author: Lavelle Khanna, RN Service: -- Author Type: Registered Nurse    Filed: 7/9/2021  3:35 PM Date of Service: 7/9/2021  3:33 PM Status: Signed    : Lavelle Khanna RN (Registered Nurse)         Problem: Daily Care  Goal: Daily care needs are met  Outcome: Progressing     Problem: Pain  Goal: Patient's pain/discomfort is manageable  Outcome: Not Progressing     Problem: Discharge Barriers  Goal: Patient's discharge needs are met  Outcome: Not Progressing   Pt reports discomfort on abdomen rated 7/10. Pt states that has not fully evacuated her bowels in approximately 2 weeks. Pt orientation fluctuating. Soap suds enema ordered. RN requested medication from storeroom to be given on evening shift. No other concerns noted.   Lavelle Khanna  7/9/2021  3:35 PM

## 2021-07-09 NOTE — PROGRESS NOTES
Progress Notes by Bernadine Crocker, PharmD at 7/9/2021 11:21 AM     Author: Bernadine Crocker, PharmD Service: Pharmacy Author Type: Pharmacist    Filed: 7/9/2021 11:23 AM Date of Service: 7/9/2021 11:21 AM Status: Signed    : Bernadine Crocker PharmD (Pharmacist)       Pharmacy Note - Admission Medication History    Pertinent Provider Information: The patient completed a 7 day course of ciprofloxacin this morning.  Daughter reports that they give medications at 0800 and 1600.     ______________________________________________________________________    Prior To Admission (PTA) med list completed and updated in EMR.       PTA Med List   Medication Sig Note Last Dose   ? carbidopa-levodopa (SINEMET)  mg per tablet Take 1 tablet by mouth 2 (two) times a day.  7/9/2021: Takes at 0800 and 1600. 7/9/2021 at 0800   ? cholecalciferol, vitamin D3, 5,000 unit Tab Take 5,000 Units by mouth 2 (two) times a day.  7/9/2021 at am   ? cyanocobalamin (VITAMIN B-12) 1000 MCG tablet Take 1,000 mcg by mouth every other day.   7/8/2021 or 7/9/2021   ? estradioL (ESTRACE) 0.01 % (0.1 mg/gram) vaginal cream Insert 2 g into the vagina 3 (three) times a week. On Monday/Wednesday/Friday 7/7/2021   ? fish oil-omega-3 fatty acids (FISH OIL) 300-1,000 mg capsule Take 1 g by mouth daily.  7/9/2021 at am   ? nitrofurantoin (MACRODANTIN) 50 MG capsule Take 50 mg by mouth daily.   7/8/2021 at 1600   ? peg 400-propylene glycol PF (SYSTANE) 0.4-0.3 % Dpet Administer 1 drop to both eyes 4 (four) times a day as needed (dry eyes).     ? polyethylene glycol (MIRALAX) 17 gram packet Take 17 g by mouth 2 (two) times a day. 7/9/2021: Patient was initially taking 8.5 g daily, but increased to 17 g two times a day on 7/8/21 7/9/2021 at am   ? tamsulosin (FLOMAX) 0.4 mg cap Take 0.4 mg by mouth Daily after breakfast.  7/9/2021 at 0800       Information source(s): Patient and Family member  Method of interview communication: in-person    Summary of  Changes to PTA Med List  New: tamsulosin, estradiol, Miralax, Systane  Discontinued: acetaminophen, melatonin  Changed: cholecalciferol, Vitamin B12, nitrofurantoin    Patient was asked about OTC/herbal products specifically.  PTA med list reflects this.    In the past week, patient estimated taking medication this percent of the time:  greater than 90%.    Allergies were reviewed, assessed, and updated with the patient.      Medications currently not available for use during hospital stay. Family/Patient representative states they will bring estradiol to [facility].    The information provided in this note is only as accurate as the sources available at the time of the update(s).    Thank you for the opportunity to participate in the care of this patient.    Bernadine Crocker, PharmD  7/9/2021 11:21 AM

## 2021-07-09 NOTE — ED TRIAGE NOTES
ED Triage Notes by Laura Aguilar RN at 7/9/2021  9:00 AM     Author: Laura Aguilar RN Service: -- Author Type: Registered Nurse    Filed: 7/9/2021  9:03 AM Date of Service: 7/9/2021  9:00 AM Status: Signed    : Laura Aguilar RN (Registered Nurse)       Patient presents here for evaluation and treatment of constipation. She has not evacuated a bowel movement for the past two weeks. She was seen by her primary provider and was advised to take Miralax twice per day. She feared getting diarrhea, so she only took it once per day until the past two days. Her daughter notes that she seemed slightly confused today and weak. She does have a history of UTI's in the past that have contributed to brief periods of confusion. She lives in a Assisted Living home, but does not receive nursing care.

## 2021-07-09 NOTE — ED NOTES
ED Notes by Irineo Hunter RN at 7/9/2021  1:14 PM     Author: Dale, Irineo, RN Service: -- Author Type: Registered Nurse    Filed: 7/9/2021  1:19 PM Date of Service: 7/9/2021  1:14 PM Status: Signed    : Irineo Hunter RN (Registered Nurse)       [facility] ED Handoff Report    ED Chief Complaint:    Chief Complaint   Patient presents with   ? Constipation       ED Diagnosis:    1. Fecal impaction in rectum (H)     2. Slow transit constipation     3. Confusion     4. Urinary retention          PMH:    Past Medical History:   Diagnosis Date   ? Arthritis    ? Hypertension    ? Melanoma (H)         Code Status:  Prior     Falls Risk: Yes    Current Living Situation/Residence: Assisted Living  Self    Elimination Status:  continent    Activity Level:  Independent    Patients Preferred Language:  English     Needed: No    Infection:  No active infections     Vital Signs:    Vitals:    07/09/21 1115   BP: 175/82   Pulse: 74   Resp: 16   Temp:    SpO2: 100%        Cardiac Rhythm: NSRR    Pain Score:  0/10    Is the Patient Confused:  Yes    Last Food or Drink: today    Focused Assessment:  Retaining urine, ansari placed, no BM 2 weeks, abd soft, feels pressure      Tests Performed:  Imaging    Abnormal Results:    Abnormal Labs Reviewed   COMPREHENSIVE METABOLIC PANEL - Abnormal; Notable for the following components:       Result Value    Sodium 134 (*)     All other components within normal limits    Narrative:     Fasting Glucose reference range is 70-99 mg/dL per  American Diabetes Association (ADA) guidelines.       XR Chest 2 Views   Final Result   Negative chest.      CT Abdomen Pelvis Without Oral Without IV Contrast   Final Result   1.  Large amount of stool throughout the colon with a large amount of stool in the rectum.   2.  Fullness of the renal collecting systems may be transient. The urinary bladder is distended. No renal or ureteral calculi identified.       CT Head Without Contrast    Final Result   1.  No intracranial hemorrhage, mass lesions, hydrocephalus or CT evidence for an acute infarct.   2.  Mild diffuse cerebral parenchymal volume loss. Presumed chronic hypertensive/microvascular ischemic white matter changes.           Treatments Provided:  Camargo placed    Family Dynamics/Concerns: No    Family Updated On Visitor Policy: Yes    Plan of Care Communicated to Family: Yes    Who Was Updated about Plan of Care: dgtr    Belongings Checklist Done and Signed by Patient: No    Covid Swab: Asymptomatic    ED Medications:  Medications - No data to display     Additional Information: see chart    Irineo Hunter RN 7/9/2021 1:15 PM

## 2021-07-10 ENCOUNTER — COMMUNICATION - HEALTHEAST (OUTPATIENT)
Dept: SCHEDULING | Facility: CLINIC | Age: 81
End: 2021-07-10

## 2021-07-10 PROBLEM — K56.41 FECAL IMPACTION IN RECTUM (H): Status: ACTIVE | Noted: 2021-07-10

## 2021-07-10 PROBLEM — R33.9 URINARY RETENTION: Status: ACTIVE | Noted: 2021-07-10

## 2021-07-10 PROBLEM — R41.0 CONFUSION: Status: ACTIVE | Noted: 2021-07-10

## 2021-07-10 PROBLEM — K59.01 SLOW TRANSIT CONSTIPATION: Status: ACTIVE | Noted: 2021-07-10

## 2021-07-10 PROCEDURE — G0378 HOSPITAL OBSERVATION PER HR: HCPCS

## 2021-07-10 PROCEDURE — 97162 PT EVAL MOD COMPLEX 30 MIN: CPT | Mod: GP

## 2021-07-10 PROCEDURE — 74283 THER NMA RDCTJ INTUS/OBSTRCJ: CPT

## 2021-07-10 PROCEDURE — A9270 NON-COVERED ITEM OR SERVICE: HCPCS | Mod: GY

## 2021-07-10 PROCEDURE — 96372 THER/PROPH/DIAG INJ SC/IM: CPT

## 2021-07-10 PROCEDURE — 97535 SELF CARE MNGMENT TRAINING: CPT | Mod: GO

## 2021-07-10 PROCEDURE — 97116 GAIT TRAINING THERAPY: CPT | Mod: GP

## 2021-07-10 PROCEDURE — 97166 OT EVAL MOD COMPLEX 45 MIN: CPT | Mod: GO

## 2021-07-10 PROCEDURE — 999N001212 HC REV CODE 9999 CHARGE CONVERSION OPNP: Mod: GP

## 2021-07-10 RX ORDER — LANOLIN ALCOHOL/MO/W.PET/CERES
3 CREAM (GRAM) TOPICAL
Status: DISCONTINUED | OUTPATIENT
Start: 2021-07-11 | End: 2021-07-11 | Stop reason: HOSPADM

## 2021-07-10 RX ORDER — DOCUSATE SODIUM 100 MG/1
100 CAPSULE, LIQUID FILLED ORAL 2 TIMES DAILY
Status: DISCONTINUED | OUTPATIENT
Start: 2021-07-11 | End: 2021-07-11 | Stop reason: HOSPADM

## 2021-07-10 RX ORDER — HEPARIN SODIUM 5000 [USP'U]/.5ML
5000 INJECTION, SOLUTION INTRAVENOUS; SUBCUTANEOUS EVERY 12 HOURS
Status: DISCONTINUED | OUTPATIENT
Start: 2021-07-11 | End: 2021-07-11 | Stop reason: HOSPADM

## 2021-07-10 RX ORDER — NITROFURANTOIN MACROCRYSTALS 50 MG/1
50 CAPSULE ORAL
Status: DISCONTINUED | OUTPATIENT
Start: 2021-07-11 | End: 2021-07-11 | Stop reason: HOSPADM

## 2021-07-10 RX ORDER — POLYETHYLENE GLYCOL 3350 17 G/17G
17 POWDER, FOR SOLUTION ORAL DAILY
Status: DISCONTINUED | OUTPATIENT
Start: 2021-07-11 | End: 2021-07-11 | Stop reason: HOSPADM

## 2021-07-10 RX ORDER — ESTRADIOL 0.1 MG/G
2 CREAM VAGINAL
COMMUNITY

## 2021-07-10 RX ORDER — HYDRALAZINE HYDROCHLORIDE 20 MG/ML
5 INJECTION INTRAMUSCULAR; INTRAVENOUS EVERY 6 HOURS PRN
Status: DISCONTINUED | OUTPATIENT
Start: 2021-07-11 | End: 2021-07-11 | Stop reason: HOSPADM

## 2021-07-10 RX ORDER — BISACODYL 10 MG
10 SUPPOSITORY, RECTAL RECTAL DAILY PRN
Status: DISCONTINUED | OUTPATIENT
Start: 2021-07-11 | End: 2021-07-11 | Stop reason: HOSPADM

## 2021-07-10 RX ORDER — TAMSULOSIN HYDROCHLORIDE 0.4 MG/1
0.4 CAPSULE ORAL DAILY
COMMUNITY

## 2021-07-10 RX ORDER — TAMSULOSIN HYDROCHLORIDE 0.4 MG/1
0.4 CAPSULE ORAL DAILY
Status: DISCONTINUED | OUTPATIENT
Start: 2021-07-11 | End: 2021-07-11 | Stop reason: HOSPADM

## 2021-07-10 RX ORDER — POLYETHYLENE GLYCOL 3350 17 G/17G
17 POWDER, FOR SOLUTION ORAL 2 TIMES DAILY
COMMUNITY

## 2021-07-10 RX ORDER — ONDANSETRON 2 MG/ML
4 INJECTION INTRAMUSCULAR; INTRAVENOUS EVERY 6 HOURS PRN
Status: DISCONTINUED | OUTPATIENT
Start: 2021-07-11 | End: 2021-07-11 | Stop reason: HOSPADM

## 2021-07-10 RX ORDER — CARBOXYMETHYLCELLULOSE SODIUM 5 MG/ML
1 SOLUTION/ DROPS OPHTHALMIC 4 TIMES DAILY PRN
Status: DISCONTINUED | OUTPATIENT
Start: 2021-07-11 | End: 2021-07-11 | Stop reason: HOSPADM

## 2021-07-10 RX ORDER — CALCIUM CARBONATE 500 MG/1
500 TABLET, CHEWABLE ORAL 3 TIMES DAILY PRN
Status: DISCONTINUED | OUTPATIENT
Start: 2021-07-11 | End: 2021-07-11 | Stop reason: HOSPADM

## 2021-07-10 RX ORDER — ACETAMINOPHEN 325 MG/1
650 TABLET ORAL EVERY 4 HOURS PRN
Status: DISCONTINUED | OUTPATIENT
Start: 2021-07-11 | End: 2021-07-11 | Stop reason: HOSPADM

## 2021-07-10 RX ORDER — CARBIDOPA AND LEVODOPA 25; 100 MG/1; MG/1
1 TABLET ORAL 2 TIMES DAILY
Status: DISCONTINUED | OUTPATIENT
Start: 2021-07-11 | End: 2021-07-11 | Stop reason: HOSPADM

## 2021-07-10 RX ORDER — HALOPERIDOL 5 MG/ML
1 INJECTION INTRAMUSCULAR EVERY 6 HOURS PRN
Status: DISCONTINUED | OUTPATIENT
Start: 2021-07-11 | End: 2021-07-11 | Stop reason: HOSPADM

## 2021-07-10 ASSESSMENT — MIFFLIN-ST. JEOR: SCORE: 1239.63

## 2021-07-10 NOTE — PLAN OF CARE
Plan of Care by Patsy James RN at 7/10/2021  2:41 PM     Author: Patsy James RN Service: -- Author Type: Registered Nurse    Filed: 7/10/2021  2:45 PM Date of Service: 7/10/2021  2:41 PM Status: Signed    : Patsy James RN (Registered Nurse)         Problem: Constipation  Goal: Constipation will be resolved  Outcome: Progressing   Pt had a extra large bowel movement after receiving a gastrografin enema in radiology.     Problem: Decreased Mental Status Causing Increased Need for Safety  Goal: To ensure patient safety, patient will abstain from any threats or actions to harm self or others  Outcome: Progressing   Pt has been alert and cooperative this shift. Using the call light appropriately to make needs known. Forgetful at times to situation but able to reorient easily.

## 2021-07-10 NOTE — PROGRESS NOTES
Progress Notes by MARY ROBB at 7/10/2021 12:54 AM     Author: MARY ROBB Service: -- Author Type: Registered Nurse    Filed: 7/10/2021 12:55 AM Date of Service: 7/10/2021 12:54 AM Status: Signed    : MARY ROBB, RN (Registered Nurse)       Pt is refusing all medications, vital signs and assessments at this time. This RN has tried to re-approach pt multiple times without success. Will attempt IV medications if pt tolerates.

## 2021-07-10 NOTE — PROGRESS NOTES
Progress Notes by Celestina Green OT at 7/10/2021  9:16 AM     Author: Celestina Green OT Service: -- Author Type: Occupational Therapist    Filed: 7/10/2021  9:17 AM Date of Service: 7/10/2021  9:16 AM Status: Signed    : Celestina Green OT (Occupational Therapist)       Occupational Therapy     07/10/21 0835   Visit Specifics   Eval Type Inital eval   Inital OT Consult  07/10/21   Bed/Tabs/Pad Alarm Applied Yes   Treatment Time   ADL Training 15   General   Onset date 07/09/21   Additional Pertinent History fecal impaction, mild cognitive impairment   Chart Reviewed Yes   PT/OT Patient/Caregiver Stated Goals home, not extended care   Family/Caregiver Present No   Precautions   General Precautions Bed alarm/Tab alarm   Home Living   Type of Home Assisted living  (Extended care)   Home Layout One level   Bathroom Shower/Tub Walk-in shower   Bathroom Toilet Raised   Bathroom Equipment Grab bars in shower;Shower chair   Mobility Equipment Walker-front wheeled   Prior Status   Independent With Dressing;Toileting;Bathing   Needs Assistance With Cooking;Cleaning;Shopping;Laundry;Driving   Lives With Alone   Comments Pt recebtlky moved to extended care but does not want to be there.  Pt very focused on going home and leaving extended care.   ADL   Lower Body Dressing Socks   Socks SBA   ADL Comments Pt states he has been in bed for a week.   Activity Tolerance   Endurance Fair   Balance   Sitting Balance Standby   Standing Balance Min assist;Mod assist   Bed Mobility   Sit to Supine Stand by assistance   Bed mobility comments Follows cues to reposition in supine   Functional Transfers   Functional Transfers Bed Transfers;Chair Transfers   Bed Transfers Min assist   Chair Transfers Min assist  (4x)   Transfer Cues Technique;Correct hand placement;Safety   Transfer Deficits Decreased balance;Fatigue;Weakness   Comments Pt stood 4 times from chair.  Pt lightheaded first two times.  Pt was  able to stand and practie shiifting wt third time.  Fourth time, pt was abl to walk around bed.  Pt very unsteady, shufflling gait.   Ambulation   Location In room   Assistance Min Assist   Device Rolling Walker   Verbal Cues Device advancement;Safety   Comments Pt needs encouragement to walk in room.  Pt just wants to go to bed.   Cognition   Overall Cognitive Status Impaired at baseline   Comments Pt endorses depression   RUE Assessment   RUE Assessment WFL   LUE Assessment   LUE Assessment WFL   Assessment   Assessment Decreased ADL status;Decreased funtional mobility;Decreased cognition;Decreased Safe judgement during ADL;Decreased endurance   Prognosis Good   Treatment/Interventions ADL training;Functional transfer training;Cognitive training   OT Frequency Daily   Goal Formulation Patient   Recommendations   OT Discharge Recommendation TCU   Treatment Suggestions for Next Session transfers, toileting, G/H, cog   OT Care Plan REVIEWED DAILY Yes, goals remain appropriate

## 2021-07-10 NOTE — PLAN OF CARE
Plan of Care by Breanna Pedersen RN at 7/9/2021  8:05 PM     Author: Breanna Pedersen RN Service: -- Author Type: Registered Nurse    Filed: 7/9/2021  8:08 PM Date of Service: 7/9/2021  8:05 PM Status: Signed    : Breanna Pedersen RN (Registered Nurse)         Problem: Pain  Goal: Patient's pain/discomfort is manageable  Outcome: Progressing   Pt denies pain.    Problem: Psychosocial Needs  Goal: Demonstrates ability to cope with hospitalization/illness  Outcome: Progressing   Pt is A/Ox2.  Is paranoid regarding medications but agreed to take all medications this evening once provided with printouts from [employer]    Problem: Psychosocial Needs  Goal: Collaborate with patient/family/caregiver to identify patient specific goals for this hospitalization  Outcome: Progressing   Educated on needs and medications.    Problem: Potential for Falls  Goal: Patient will remain free of falls  Outcome: Progressing  Bed alarm in place for safety.     Problem: Constipation  Goal: Constipation will be resolved  Outcome: Progressing   Pt just received suppository per PRN orders.    Breanna Pdeersen

## 2021-07-10 NOTE — PROGRESS NOTES
"Progress Notes by Oly Nayak SW at 7/10/2021  2:38 PM     Author: Oly Nayak SW Service: Behavioral Author Type:     Filed: 7/10/2021  2:53 PM Date of Service: 7/10/2021  2:38 PM Status: Signed    : Oly Nayak SW ()       Care Management Follow Up Note    Length of Stay (days) 0     Patient plan of care discussed at Interdisciplinary Rounds: yes  CM Patient/Caregiver Stated Goals: To return home to her [facility] apartment.  Pt/Rep Involved in D/C Plan: Yes  Pt/Rep Agree with D/C Plan: Yes  Pt/Rep Education / Interventions: Discuss Medicare Observation status    Expected Discharge Date: 07/10/21  Concerns to be Addressed / Barriers to Discharge:      Anticipated Discharge Disposition: Assisted Living    Plan:  YURIDIA reviewed notes and spoke with pts daughter, Raiza, over the phone. She confirmed information from initial assessment with pt indicating that pt lives at [facility]. She reports pt moved there in March so it has been a recent move. She confirmed that pt receives housekeeping, laundry, emergency pendant and meal delivery. She notes that pt does not have medications set up but that family assists with this. She also reports that they have been considering enrolling pt in the \"care program\" but have not done so yet. She notes that if pt had this that a nurse would assist in managing pts needs/care and she could get assistance of a call light (currently she does not have this service). She reports that typically pt is able to get herself to the bathroom with her walker. She reports plan to have an MRI and spine injection within the next month to help with some of her back pain.    Discussed PT/OT recommendation for TCU and Raiza reports uncertainty about this. She reports that pt will be upset about a placement and also notes that pt had much increased cognitive difficulty and confusion during a TCU placement last summer. She acknowledges pt still has " some cognitive difficulties. She reports she can discuss TCU with her family further but would prefer if pt could return to her apartment and go to therapies as she did a few weeks ago. She said this was coordinated through the facility. YURIDIA discussed plan to try and reach nurse at [facility] to discuss and to discuss further with RN. Raiza reports understanding and agreement.    YURIDIA discussed with pts RN who reports plan to see if pts mobility improves some since enema was completed. RN will plan to update Raiza later today. YURIDIA attempted to call nurse at [facility] ([phone]) and left VM requesting return call to discuss possible services/therapy options. YURIDIA called main line at [facility] and was informed that a nurse is available on-call but can be reached at the above number. YURIDIA will continue to follow for discharge planning.    YURIDIA Tong

## 2021-07-10 NOTE — PROGRESS NOTES
Progress Notes by Dale Chauhan MD at 7/10/2021 12:37 PM     Author: Dale Chauhan MD Service: Hospitalist Author Type: Physician    Filed: 7/10/2021 12:39 PM Date of Service: 7/10/2021 12:37 PM Status: Signed    : Dale Chauhan MD (Physician)       Progress Note  Summary/Update  Frail 81-year-old woman with multiple medical problems including progressive neurologic decline described as parkinsonism presents to the hospital with abdominal discomfort and severe constipation with impaction    She is admitted to observation care if the Gastrografin enema ordered earlier today is effective she may go home with family later today to assisted living campus I spoke with her daughter Raiza      Assessment/Plan/Narrative    Active Hospital Problems    *Fecal impaction (H)      Cognitive and behavioral changes      Psychomotor retardation      Parkinsons disease (H)      Essential hypertension with goal blood pressure less than 130/80          Code Status DNR    Discharge Planning    Subjective: Marked psychomotor retardation difficult for casual conversation she is reasonably comfortable now she understands that she needs the enema to clear her colon of the extensive stool      Objective:  Less than 10 mL of urine   Vital signs in last 24 hours  Temp:  [98.2  F (36.8  C)-98.9  F (37.2  C)] 98.2  F (36.8  C)  Heart Rate:  [68-89] 85  Resp:  [20-23] 20  BP: (119-188)/(56-86) 119/56  Weight:   163 lb 9.6 oz (74.2 kg)    Intake/Output last 3 shifts  I/O last 3 completed shifts:  In: 400 [P.O.:400]  Out: 1450 [Urine:1450]  Intake/Output this shift:  I/O this shift:  In: 240 [P.O.:240]  Out: 200 [Urine:200]      Physical Exam  General: Chronically ill-appearing flat affect  VS-see above  HEENT:  Lungs:  Cor:  ABD: Benign no rebound tenderness  Ext :  Neuro: Marked psychomotor retardation not much in way of tremor that is evident    Pertinent Labs   Lab Results   Component Value Date    WBC 6.3  07/09/2021    HGB 13.8 07/09/2021    HCT 40.5 07/09/2021    MCV 95 07/09/2021     07/09/2021     Lab Results   Component Value Date    CREATININE 0.89 07/09/2021    BUN 12 07/09/2021     (L) 07/09/2021    K 4.7 07/09/2021     07/09/2021    CO2 25 07/09/2021         Dale Chauhan MD.     Total time 35min    15    ' Review new labs/newchart notes/interval updates/edit problem list  10     ' In room examine/discuss with pt and or family daughter Raiza called   10' Review in person/on phone with consultants,  care mgr/ancillary services/RN

## 2021-07-10 NOTE — PLAN OF CARE
Plan of Care by Paulina Mayer RN at 7/9/2021  7:02 PM     Author: Paulina Mayer RN Service: -- Author Type: Registered Nurse    Filed: 7/9/2021  7:08 PM Date of Service: 7/9/2021  7:02 PM Status: Addendum    : Paulina Mayer RN (Registered Nurse)    Related Notes: Original Note by Paulina Mayer RN (Registered Nurse) filed at 7/9/2021  7:07 PM       Pt has had no further stool tonight.  RN offered suppository, pt is refusing now.      Pt is very confused, paranoid, not making a lot of sense and pulling at ansari.  Pt states she wants to go home.  Pt thinks she is in an office building and we are holding her against her will.  RN sat with pt and offered reassurance, but pt states she does not believe me.   RN assisted pt in calling her daughter who was able to talk with her for a while and calm her down.    RN and aide also have taken turns sitting with her and attempting to distract her. RN offered pt melatonin and she adamantly refused.     RN did update charge nurse in case a 1:1 is warranted for safety as pt already had the dizzy spell earlier.  RN will continue to assess and attempt to get a 1:1 for safety.  Bed alarm is on.

## 2021-07-10 NOTE — PROGRESS NOTES
Progress Notes by Archana Jeter, PT at 7/10/2021 11:30 AM     Author: Archana Jeter, PT Service: -- Author Type: Physical Therapist    Filed: 7/10/2021  5:01 PM Date of Service: 7/10/2021 11:30 AM Status: Signed    : Archana Jeter PT (Physical Therapist)       Physical Therapy     07/10/21 1100   Visit Specifics   Eval Type Initial eval   Inital PT Consult 07/10/21   Bed/Tabs/Pad Alarm Applied Yes   General   Onset date 07/09/21   Chart Reviewed Yes   Family/Caregiver Present No   Treatment Time   Gait Training 10   Theraputic Activity 5   Precautions   General Precautions Bed alarm/Tab alarm   Home Living   Mobility Equipment Walker-4 wheeled  (denies owning FWW)   Additional Comments see OT   Prior Status   Device Used Yes   Comments CM note reports pt does not get nursing cares or meds given at [facility]   Cognition   Overall Cognitive Status Impaired   Orientation Level Oriented to person;Oriented to time;Disoriented to place   RLE Assessment   RLE Assessment   (weak, mild rigidity)   LLE Assessment   LLE Assessment   (weak, mild rigidity)   Bed Mobility   Supine to Sit Min assist   Transfer    Sit To Stand Min assist   Stand To Sit Min assist   Ambulation    Distance (ft)  120   Assistance Minimum assistance   Assistive Device 4-wheeled walker   Verbal Cues Device advancement;Safety;Posture;Attention to task;Attention to direction   Quality of Gait/Comment shuffling steps and flexed posture, 4WW gradually gets farther from body, assistance required to correct   Pattern Decreased step length;R decreased foot clearance;L decreased foot clearance;R decreased heel strike;L decreased heel strike;Decreased pace;Flexed posture;Unsteady;Narrow SUSHMA   Fall Risk   Fall Risk High   Plan   Treatment/Interventions Functional transfer training;Gait/stair training;Home exercise program;Neuromuscular re-ed;Strengthening/ROM   PT Frequency Daily   Assessment   Prognosis Fair   Problem List Decreased mobility;Impaired  balance;Decreased endurance;Decreased strength;Decreased cognition;Impaired tone   Barriers to Discharge Decreased caregiver support   Recommendation   PT Discharge Recommendation TCU   PT Equipment Recommendation Rolling walker / FWW   Treatment Suggestions for Next Session amb with FWW vs 4WW, safety   PT Summary may need increased services to return to [facility], e.g. meds   PT Care Plan REVIEWED DAILY Yes, goals remain appropriate   Archana Jeter DPT 7/10/2021,  [id number]

## 2021-07-11 VITALS
TEMPERATURE: 98.9 F | HEIGHT: 67 IN | DIASTOLIC BLOOD PRESSURE: 85 MMHG | BODY MASS INDEX: 25.67 KG/M2 | OXYGEN SATURATION: 96 % | HEART RATE: 74 BPM | RESPIRATION RATE: 18 BRPM | SYSTOLIC BLOOD PRESSURE: 185 MMHG | WEIGHT: 163.58 LBS

## 2021-07-11 PROBLEM — N39.0 RECURRENT URINARY TRACT INFECTION: Status: ACTIVE | Noted: 2021-07-10

## 2021-07-11 PROBLEM — R26.9 GAIT ABNORMALITY: Status: ACTIVE | Noted: 2021-07-10

## 2021-07-11 PROBLEM — G20.A1 PARKINSONS DISEASE (H): Status: ACTIVE | Noted: 2021-07-10

## 2021-07-11 PROBLEM — G62.9 PERIPHERAL POLYNEUROPATHY: Status: ACTIVE | Noted: 2021-07-10

## 2021-07-11 PROBLEM — I95.1 ORTHOSTATIC HYPOTENSION: Status: ACTIVE | Noted: 2021-07-10

## 2021-07-11 PROBLEM — K56.41 FECAL IMPACTION (H): Status: ACTIVE | Noted: 2021-07-09

## 2021-07-11 PROBLEM — R29.6 RECURRENT FALLS: Status: ACTIVE | Noted: 2021-07-10

## 2021-07-11 PROBLEM — R41.843 PSYCHOMOTOR RETARDATION: Status: ACTIVE | Noted: 2021-07-10

## 2021-07-11 PROBLEM — R29.818 NEUROGENIC CLAUDICATION: Status: ACTIVE | Noted: 2021-07-10

## 2021-07-11 PROCEDURE — 99239 HOSP IP/OBS DSCHRG MGMT >30: CPT | Performed by: INTERNAL MEDICINE

## 2021-07-11 PROCEDURE — G0378 HOSPITAL OBSERVATION PER HR: HCPCS

## 2021-07-11 PROCEDURE — 250N000011 HC RX IP 250 OP 636

## 2021-07-11 PROCEDURE — 250N000013 HC RX MED GY IP 250 OP 250 PS 637: Mod: GY

## 2021-07-11 PROCEDURE — 96372 THER/PROPH/DIAG INJ SC/IM: CPT

## 2021-07-11 RX ORDER — DOCUSATE SODIUM 100 MG/1
100 CAPSULE, LIQUID FILLED ORAL 2 TIMES DAILY
Start: 2021-07-11

## 2021-07-11 RX ADMIN — CARBIDOPA AND LEVODOPA 1 TABLET: 25; 100 TABLET ORAL at 08:25

## 2021-07-11 RX ADMIN — HEPARIN SODIUM 5000 UNITS: 10000 INJECTION, SOLUTION INTRAVENOUS; SUBCUTANEOUS at 08:27

## 2021-07-11 RX ADMIN — TAMSULOSIN HYDROCHLORIDE 0.4 MG: 0.4 CAPSULE ORAL at 08:25

## 2021-07-11 RX ADMIN — ACETAMINOPHEN 650 MG: 325 TABLET ORAL at 08:26

## 2021-07-11 ASSESSMENT — ACTIVITIES OF DAILY LIVING (ADL): ORAL_HYGIENE: WITH ASSISTANCE

## 2021-07-11 NOTE — PLAN OF CARE
Problem: Adult Inpatient Plan of Care  Goal: Plan of Care Review  Outcome: Adequate for Discharge  Flowsheets (Taken 7/11/2021 1131)  Plan of Care Reviewed With:   patient   daughter  Outcome Summary: Pt discharged to home via daughter. pt and daughter verbalize understanding discharge instructions and follow up appointments. Pt has all of her personal items.  Progress: improving

## 2021-07-11 NOTE — PLAN OF CARE
Physical Therapy Discharge Summary    Reason for therapy discharge:    Discharged to home. [facility]    Progress towards therapy goal(s). See goals on Care Plan in [facility] electronic health record for goal details.  Goals partially met.  Barriers to achieving goals:   limited tolerance for therapy.    Therapy recommendation(s):    Continue home exercise program.

## 2021-07-11 NOTE — PHARMACY-ADMISSION MEDICATION HISTORY
Medication history completed upon admission. See pharmacy admission medication history note from 7/9/21.

## 2021-07-11 NOTE — PLAN OF CARE
Plan of Care by Shagufta Corea RN at 7/10/2021 11:10 PM     Author: Shagufta Corea RN Service: -- Author Type: Registered Nurse    Filed: 7/10/2021 11:16 PM Date of Service: 7/10/2021 11:10 PM Status: Signed    : Shagufta Corea RN (Registered Nurse)       Denied pain. VSS.  Golytely and magnesium citrate solutions administered per orders. Pt has had more than two medium loose stools.  Had one episode of feeling dizzy with ambulation from bathroom. Tends to get up and out of bed without using the call light, bed alarm on and re-educated.    Shagufta Corea, SANTY

## 2021-07-12 NOTE — PLAN OF CARE
Occupational Therapy Discharge Summary    Reason for therapy discharge:    Discharge home    Progress towards therapy goal(s). See goals on Care Plan in [facility] electronic health record for goal details.  Goals partially met.  Barriers to achieving goals:   discharge from facility.    Therapy recommendation(s):    No further therapy is recommended.

## 2021-07-17 NOTE — DISCHARGE SUMMARY
[facility]  Hospitalist Discharge Summary      Date of Admission:  7/9/2021  Date of Discharge:  7/11/2021 11:31 AM  Discharging Provider: Dale Chauhan MD      Discharge Diagnoses   1. Constipation/obstipation/fecal impaction  2 see problem list- for multiple chronic dx    Follow-ups Needed After Discharge   Follow-up Appointments     Follow-up and recommended labs and tests       Follow up with primary provider at [facility] in ~ 1 week             Unresulted Labs Ordered in the Past 30 Days of this Admission     No orders found from 6/9/2021 to 7/10/2021.      These results will be followed up by NA    Discharge Disposition   Discharged to home  Condition at discharge: Satisfactory      Hospital Course    See H&P, multiple chronic dx but issue now is constipation. No BM for 2 weeks+. Miralax recommended by primary provider but patient reluctant. She is frail with multiple isssues but stable except for constipation.  See CT scan; Gastrografin enema attempted in Radiology suite but not tolerated. Then GoLytely (1 Liter) and Mg Citrate with good results/   Felt much better and discharges to assisted living with family       Consultations This Hospital Stay   OCCUPATIONAL THERAPY ADULT IP CONSULT  PHYSICAL THERAPY ADULT IP CONSULT    Code Status   No CPR- Do NOT Intubate    Time Spent on this Encounter   I, Dale Chauhan MD, personally saw the patient today and spent greater than 30 minutes discharging this patient.       Dale Chauhan MD  [address] [address]  Phone: [phone]  Fax: [phone]  ______________________________________________________________________    Physical Exam   Vital Signs:                   Weight: 163 lbs 9.3 oz  Pleasant chronically ill woman with some psychomotor retardation but much improved at discharge  Abdomen benign       Primary Care Physician   Chelle Casanova    Discharge Orders       Reason for your hospital stay    Admitted for severe constipation/obstipation     Follow-up and recommended labs and tests     Follow up with primary provider at [facility] in ~ 1 week     Activity    Your activity upon discharge:as tolerated     No CPR- Do NOT Intubate     Diet    Follow this diet upon discharge: regular       Significant Results and Procedures   Results for RULA MELGOZA (MRN [id number]) as of 7/17/2021 07:26   Ref. Range 7/9/2021 10:56   pH Urine Latest Ref Range: 5.0 - 8.0  6.0   Color Urine Latest Ref Range: Light Yellow, Yellow  Colorless   Appearance Urine Latest Ref Range: Clear  Clear   Glucose Urine Latest Ref Range: Negative  Negative   Bilirubin Urine Latest Ref Range: Negative  Negative   Ketones Urine Latest Ref Range: Negative  Negative   Specific Gravity Urine Latest Ref Range: 1.001 - 1.030  1.007   Protein Albumin Urine Latest Ref Range: Negative  Negative   Urobilinogen Urine Latest Ref Range: <2.0 mg/dL  <2.0 mg/dL   Nitrite Urine Latest Ref Range: Negative  Negative   Blood Urine Latest Ref Range: Negative  Negative   Leukocyte Esterase Urine Latest Ref Range: Negative  Negative   Results for RULA MELGOZA (MRN [id number]) as of 7/17/2021 07:26   Ref. Range 7/9/2021 10:21   WBC Latest Ref Range: 4.0 - 11.0 thou/uL 6.3   Hemoglobin Latest Ref Range: 12.0 - 16.0 g/dL 13.8   Hematocrit Latest Ref Range: 35.0 - 47.0 % 40.5   Platelet Count Latest Ref Range: 140 - 440 thou/uL 198   RBC Count Latest Ref Range: 3.80 - 5.40 mill/uL 4.27   MCV Latest Ref Range: 80 - 100 fL 95   MCH Latest Ref Range: 27.0 - 34.0 pg 32.3   MCHC Latest Ref Range: 32.0 - 36.0 g/dL 34.1   RDW Latest Ref Range: 11.0 - 14.5 % 12.0   Mean Platelet Volume Latest Ref Range: 8.5 - 12.5 fL 10.7   Glucose Latest Ref Range: 70 - 125 mg/dL 108   Sodium Latest Ref Range: 136 - 145 mmol/L 134 (L)   Potassium Latest Ref Range: 3.5 - 5.0 mmol/L 4.7   Chloride Latest Ref Range: 98 - 107 mmol/L 101   Carbon Dioxide Latest  Ref Range: 22 - 31 mmol/L 25   Urea Nitrogen Latest Ref Range: 8 - 28 mg/dL 12   Creatinine Latest Ref Range: 0.60 - 1.10 mg/dL 0.89   GFR Estimate Latest Ref Range: >60 mL/min/1.73m2 >60   GFR Estimate If Black Latest Ref Range: >60 mL/min/1.73m2 >60   Calcium Latest Ref Range: 8.5 - 10.5 mg/dL 9.6   Anion Gap Latest Ref Range: 5 - 18 mmol/L 8   Albumin Latest Ref Range: 3.5 - 5.0 g/dL 4.0   Protein Total Latest Ref Range: 6.0 - 8.0 g/dL 7.3   Bilirubin Total Latest Ref Range: 0.0 - 1.0 mg/dL 0.7   ALT Latest Ref Range: 0 - 45 U/L <9   AST Latest Ref Range: 0 - 40 U/L 15   Lactic Acid Latest Ref Range: 0.7 - 2.0 mmol/L 0.7   Lipase Latest Ref Range: 0 - 52 U/L 14   Alkaline Phosphatase Latest Ref Range: 45 - 120 U/L 58     & Impression  EXAM: XR GASTROGRAFIN ENEMA THERAPEUTIC  LOCATION: [facility]  DATE/TIME: 7/10/2021 1:29 PM     INDICATION: Severe constipation and colonic stool.  COMPARISON: None.  TECHNIQUE: Routine.     FINDINGS:  FLUOROSCOPIC TIME: .9 minutes.  NUMBER OF IMAGES: 2.     FINDINGS: Water soluble contrast administered via gravity feed. Only a small amount of contrast was able to be instilled into the rectum and sigmoid colon secondary to patient discomfort and inability to proceed with the procedure. Procedure terminated   subsequently. Extensive colonic stool noted.  Narrative & Impression  EXAM: XR CHEST 2 VIEWS  LOCATION: [facility]  DATE/TIME: 7/9/2021 11:48 AM     INDICATION: confusion  COMPARISON: 6/18/2020     IMPRESSION:  Negative chest.  Study Result    Narrative & Impression   EXAM: CT ABDOMEN PELVIS WO ORAL WO IV CONTRAST  LOCATION: [facility]  DATE/TIME: 7/9/2021 11:46 AM     INDICATION: constipation, pain  COMPARISON: 10/16/2018   TECHNIQUE: CT scan of the abdomen and pelvis was performed without oral or IV contrast. Multiplanar reformats were obtained. Dose reduction techniques were used.  CONTRAST: None.      FINDINGS:   LOWER CHEST: Coronary artery disease.      HEPATOBILIARY:  Partially calcified cystic lesion in the posterior right hepatic lobe, unchanged and most likely benign. Again seen is a 1.6 cm lesion in the lateral left hepatic lobe which has not changed and is presumably a cyst. No follow-up is   required.           Discharge Medications   Discharge Medication List as of 7/11/2021 11:19 AM      START taking these medications    Details   docusate sodium (COLACE) 100 MG capsule Take 1 capsule (100 mg) by mouth 2 times daily, No Print Out         CONTINUE these medications which have NOT CHANGED    Details   artificial tears OINT ophthalmic ointment At Bedtime, Historical      estradiol (ESTRACE) 0.1 MG/GM vaginal cream Place 2 g vaginally three times a week Monday, Wednesday, FridayHistorical      polyethylene glycol (MIRALAX) 17 g packet Take 17 g by mouth 2 times daily, Historical      polyethylene glycol-propylene glycol (SYSTANE ULTRA) 0.4-0.3 % SOLN ophthalmic solution Place 1 drop into both eyes 4 times daily as needed for dry eyes, Historical      tamsulosin (FLOMAX) 0.4 MG capsule Take 0.4 mg by mouth daily, Historical      Vitamin D3 (CHOLECALCIFEROL) 125 MCG (5000 UT) tablet Take 125 mcg by mouth 2 times daily 125 mcg = 5,000 units, Historical      carbidopa-levodopa (SINEMET)  mg per tablet [CARBIDOPA-LEVODOPA (SINEMET)  MG PER TABLET] Take 1 tablet by mouth 2 (two) times a day. , Historical      cyanocobalamin (VITAMIN B-12) 1000 MCG tablet Take 1,000 mcg by mouth every other day , Historical      fish oil-omega-3 fatty acids (FISH OIL) 300-1,000 mg capsule [FISH OIL-OMEGA-3 FATTY ACIDS (FISH OIL) 300-1,000 MG CAPSULE] Take 1 g by mouth daily., Historical      nitrofurantoin (MACRODANTIN) 50 MG capsule Take 50 mg by mouth daily , Historical           Allergies   Allergies   Allergen Reactions     Hydrochlorothiazide Other (See Comments)     Foggy feeling (electrolyte imbalance and  ended up in hospital)     Rosuvastatin Unknown     Other reaction(s): *Unknown, caused abd wall pain

## 2021-07-19 ENCOUNTER — LAB REQUISITION (OUTPATIENT)
Dept: LAB | Facility: CLINIC | Age: 81
End: 2021-07-19

## 2021-07-19 DIAGNOSIS — R41.3 OTHER AMNESIA: ICD-10-CM

## 2021-07-19 DIAGNOSIS — E55.9 VITAMIN D DEFICIENCY, UNSPECIFIED: ICD-10-CM

## 2021-07-19 DIAGNOSIS — R39.15 URGENCY OF URINATION: ICD-10-CM

## 2021-07-19 LAB — VIT B12 SERPL-MCNC: 1286 PG/ML (ref 213–816)

## 2021-07-19 PROCEDURE — 87086 URINE CULTURE/COLONY COUNT: CPT | Performed by: FAMILY MEDICINE

## 2021-07-19 PROCEDURE — 36415 COLL VENOUS BLD VENIPUNCTURE: CPT | Performed by: FAMILY MEDICINE

## 2021-07-19 PROCEDURE — 82306 VITAMIN D 25 HYDROXY: CPT | Performed by: FAMILY MEDICINE

## 2021-07-19 PROCEDURE — 82607 VITAMIN B-12: CPT | Performed by: FAMILY MEDICINE

## 2021-07-20 LAB
BACTERIA UR CULT: NORMAL
DEPRECATED CALCIDIOL+CALCIFEROL SERPL-MC: 53 UG/L (ref 30–80)

## 2021-07-28 NOTE — PROGRESS NOTES
Progress Notes by Princess Howe OT at 7/11/2021  1:01 AM     Author: Princess Howe OT Service: Hospitalist Author Type: Occupational Therapist    Filed: 7/16/2021  3:21 PM Date of Service: 7/11/2021  1:01 AM Status: Attested    : Princess Howe OT (Occupational Therapist) Cosigner: Nany Boyce MD at 7/28/2021  2:23 PM    Attestation signed by Nany Boyce MD at 7/28/2021  2:23 PM    agree                Occupational Therapy      Dear Dr. Boyce,    As you may recall, we have been seeing Alice Lawson for Occupational Therapy.    For therapy, Medicare, Medicaid, and a few other insurers, require periodic physician review of the treatment plan. Please review the summary of the patient's progress and our plan of care for therapy, which was provided while the patient was hospitalized, and verify  that you agree with the goals entered in the plan of care by co-signing this note.  Please see flowsheets within Epic for further information.    Plan of Care:    Acute care goals to be addressed by 7/115/2021 in order to maximize I with ADLs -Pt will complete toileting with CGA using AE PRN to increase I with ADLs =Pt will complete functional mobility in room and bath with CGA using FWW PRN to increase I with ADLs -Pt will participate in cognitive assessment as needed to A with discharge planning.     Goals initiated by Celestina Brantley on 7/10/2021        If you have any questions or concerns, please don't hesitate to call.      Princess Howe      Physician recommendation:        ___ Modify therapy      *Physician co-signature indicates they certify the need for these services furnished within this plan and while under their care.

## 2021-07-28 NOTE — PROGRESS NOTES
Progress Notes by Princess Howe OT at 7/11/2021  1:01 AM     Author: Princess Howe OT Service: Hospitalist Author Type: Occupational Therapist    Filed: 7/16/2021  3:16 PM Date of Service: 7/11/2021  1:01 AM Status: Attested    : Princess Howe OT (Occupational Therapist) Cosigner: Nany Boyce MD at 7/28/2021  2:23 PM    Attestation signed by Nany Boyce MD at 7/28/2021  2:23 PM    agree                Physical Therapy      Dear Dr. Boyce,    As you may recall, we have been seeing Alice Lawson for Physical Therapy.    For therapy, Medicare, Medicaid, and a few other insurers, require periodic physician review of the treatment plan. Please review the summary of the patient's progress and our plan of care for therapy, which was provided while the patient was hospitalized, and verify  that you agree with the goals entered in the plan of care by co-signing this note.  Please see flowsheets within Epic for further information.    Plan of Care:    Patient will demonstrate the following by 7/15/21, in order to maximize independence with functional mobility to facilitate safe discharge: -Supine<>sit with head of bed flat, no rail, SBA -Sit<>stand with RW assistive device, SBA -Ambulate 50 feet with RW assistive device, SBA     Goals entered on 7/10/2021 by Archana Jeter PT         If you have any questions or concerns, please don't hesitate to call.      Princess Howe      Physician recommendation:        ___ Modify therapy      *Physician co-signature indicates they certify the need for these services furnished within this plan and while under their care.

## 2021-08-09 NOTE — ED PROVIDER NOTES
"ED Provider Notes by Ricky Ledezma MD at 7/9/2021 12:19 PM     Author: Ricky Ledezma MD Service: Emergency Medicine Author Type: Physician    Filed: 8/9/2021  3:38 PM Date of Service: 7/9/2021 12:19 PM Status: Addendum    : Ricky Ledezma MD (Physician)    Related Notes: Original Note by Ricky Ledezma MD (Physician) filed at 8/9/2021  3:37 PM         Date/Time:7/9/2021 12:20 PM        eMERGENCY medicine Consult note     CHIEF COMPLAINT    Chief Complaint   Patient presents with   ? Constipation           HPI    Alice Lawson is a 81 y.o. female who presents constipation.    She states that she has chronic constipation and has has this problem before and was prescribed Miralax. However, she accidentally took too much Miralax one day and had really bad diarrhea, which has made her afraid to take Miralax since. Patient's daughter reports that she has only taken half doses of her Miralax. Yesterday, the patient's daughter was able to convince her to take a full dose of Miralax, which is 2 pills twice daily, and reports that the patient had a bowel movement that resembled \"agustin\". She has taken Miralax again today, but reports no bowel movement. She reports that she is able to pass some gas occasionally.  The patient's daughter also reports that she has noticed the patient has been disorientated over the past few weeks. She reports that she recently moved to assisted living on 03/01 (5 months ago) and is usually able to live mostly independently. However, her daugter states that the patient recently asked that she needed help going to the bathroom because she did not know where it was. She states that her mother's sleeplessness and transition into her assisted living facility may be the cause of her confusion.  Additionally, the patient's daughter states that the patient was initially diagnosed with Parkinson's disease, but was later reassessed by a different neurologist who told her " "that she had a \"Parkinson's mimic-er,\" stating that she only has some Parkinson's symptoms, partially due to her spinal stenosis. She is taking some medications for this, but not as many as if she had Parkinson's.    PAST MEDICAL HISTORY    Past Medical History:   Diagnosis Date   ? Arthritis    ? Hypertension    ? Melanoma (H)      Past Surgical History:   Procedure Laterality Date   ? CATARACT EXTRACTION Bilateral     2014   ? KNEE ARTHROSCOPY Bilateral    ? NECK SURGERY     ? REPLACEMENT TOTAL KNEE Left 1/5/2017    Procedure: LEFT TOTAL KNEE ARTHROPLASTY COMPUTER ASSIST;  Surgeon: Micah Gallagher MD;  Location: [facility];  Service:        CURRENT MEDICATIONS    Current Outpatient Medications   Medication Sig Note   ? carbidopa-levodopa (SINEMET)  mg per tablet Take 1 tablet by mouth 2 (two) times a day.  7/9/2021: Takes at 0800 and 1600.   ? cholecalciferol, vitamin D3, 5,000 unit Tab Take 5,000 Units by mouth 2 (two) times a day.    ? cyanocobalamin (VITAMIN B-12) 1000 MCG tablet Take 1,000 mcg by mouth every other day.     ? estradioL (ESTRACE) 0.01 % (0.1 mg/gram) vaginal cream Insert 2 g into the vagina 3 (three) times a week. On Monday/Wednesday/Friday    ? fish oil-omega-3 fatty acids (FISH OIL) 300-1,000 mg capsule Take 1 g by mouth daily.    ? nitrofurantoin (MACRODANTIN) 50 MG capsule Take 50 mg by mouth daily.     ? peg 400-propylene glycol PF (SYSTANE) 0.4-0.3 % Dpet Administer 1 drop to both eyes 4 (four) times a day as needed (dry eyes).    ? polyethylene glycol (MIRALAX) 17 gram packet Take 17 g by mouth 2 (two) times a day. 7/9/2021: Patient was initially taking 8.5 g daily, but increased to 17 g two times a day on 7/8/21   ? tamsulosin (FLOMAX) 0.4 mg cap Take 0.4 mg by mouth Daily after breakfast.        Medications - No data to display    ALLERGIES    Allergies   Allergen Reactions   ? Hydrochlorothiazide Other (See Comments)     Foggy feeling (electrolyte imbalance and ended up " in hospital)   ? Rosuvastatin      Other reaction(s): *Unknown, caused abd wall pain       FAMILY HISTORY    History reviewed. No pertinent family history.    SOCIAL HISTORY    Social History     Socioeconomic History   ? Marital status:      Spouse name: None   ? Number of children: None   ? Years of education: None   ? Highest education level: None   Occupational History   ? None   Social Needs   ? Financial resource strain: None   ? Food insecurity     Worry: None     Inability: None   ? Transportation needs     Medical: None     Non-medical: None   Tobacco Use   ? Smoking status: Never Smoker   ? Smokeless tobacco: Never Used   Substance and Sexual Activity   ? Alcohol use: No   ? Drug use: No   ? Sexual activity: None   Lifestyle   ? Physical activity     Days per week: None     Minutes per session: None   ? Stress: None   Relationships   ? Social connections     Talks on phone: None     Gets together: None     Attends [employer] service: None     Active member of club or organization: None     Attends meetings of clubs or organizations: None     Relationship status: None   ? Intimate partner violence     Fear of current or ex partner: None     Emotionally abused: None     Physically abused: None     Forced sexual activity: None   Other Topics Concern   ? None   Social History Narrative   ? None       REVIEW OF SYSTEMS      Review of Systems   Constitutional: Negative for appetite change, chills and fever.   Respiratory: Negative for cough and shortness of breath.    Cardiovascular: Negative for chest pain.   Gastrointestinal: Positive for constipation. Negative for abdominal pain and vomiting.   Genitourinary: Negative for dysuria and hematuria.   Musculoskeletal: Negative for back pain.   Neurological: Positive for weakness (generalized). Negative for dizziness, syncope and headaches.   All other systems reviewed and are negative.      PHYSICAL EXAM    VITAL SIGNS:   /77   Pulse 77   Temp 98.4  " F (36.9  C) (Oral)   Resp 20   Ht 5' 7\" (1.702 m)   Wt 164 lb (74.4 kg)   SpO2 99%   BMI 25.69 kg/m      Physical Exam  HENT:      Head: Normocephalic.   Cardiovascular:      Rate and Rhythm: Normal rate.   Pulmonary:      Effort: Pulmonary effort is normal.   Musculoskeletal:      Cervical back: Neck supple.   Skin:     General: Skin is warm.   Neurological:      Mental Status: She is alert.         LABS  Pertinent lab results reviewed in chart.  Results for orders placed or performed during the hospital encounter of 07/09/21   HM2 (CBC W/O DIFF)   Result Value Ref Range    WBC 6.3 4.0 - 11.0 thou/uL    RBC 4.27 3.80 - 5.40 mill/uL    Hemoglobin 13.8 12.0 - 16.0 g/dL    Hematocrit 40.5 35.0 - 47.0 %    MCV 95 80 - 100 fL    MCH 32.3 27.0 - 34.0 pg    MCHC 34.1 32.0 - 36.0 g/dL    RDW 12.0 11.0 - 14.5 %    Platelets 198 140 - 440 thou/uL    MPV 10.7 8.5 - 12.5 fL   Comprehensive Metabolic Panel   Result Value Ref Range    Sodium 134 (L) 136 - 145 mmol/L    Potassium 4.7 3.5 - 5.0 mmol/L    Chloride 101 98 - 107 mmol/L    CO2 25 22 - 31 mmol/L    Anion Gap, Calculation 8 5 - 18 mmol/L    Glucose 108 70 - 125 mg/dL    BUN 12 8 - 28 mg/dL    Creatinine 0.89 0.60 - 1.10 mg/dL    GFR MDRD Af Amer >60 >60 mL/min/1.73m2    GFR MDRD Non Af Amer >60 >60 mL/min/1.73m2    Bilirubin, Total 0.7 0.0 - 1.0 mg/dL    Calcium 9.6 8.5 - 10.5 mg/dL    Protein, Total 7.3 6.0 - 8.0 g/dL    Albumin 4.0 3.5 - 5.0 g/dL    Alkaline Phosphatase 58 45 - 120 U/L    AST 15 0 - 40 U/L    ALT <9 0 - 45 U/L   Lipase   Result Value Ref Range    Lipase 14 0 - 52 U/L   Lactic Acid   Result Value Ref Range    Lactic Acid 0.7 0.7 - 2.0 mmol/L   Urinalysis-UC if Indicated   Result Value Ref Range    Color, UA Colorless Light Yellow, Yellow    Clarity, UA Clear Clear    Glucose, UA Negative Negative    Protein, UA Negative Negative    Bilirubin, UA Negative Negative    Urobilinogen, UA <2.0 mg/dL <2.0 mg/dL    pH, UA 6.0 5.0 - 8.0    Blood, UA " Negative Negative    Ketones, UA Negative Negative    Nitrite, UA Negative Negative    Leukocytes, UA Negative Negative    Specific Gravity, UA 1.007 1.001 - 1.030   Asymptomatic SARS-CoV-2 (COVID-19)-PCR    Specimen: Respiratory   Result Value Ref Range    SARS-CoV-2 PCR Result Negative Negative, Invalid           RADIOLOGY  Ct Abdomen Pelvis Without Oral Without Iv Contrast    Result Date: 7/9/2021  EXAM: CT ABDOMEN PELVIS WO ORAL WO IV CONTRAST LOCATION: [facility] DATE/TIME: 7/9/2021 11:46 AM INDICATION: constipation, pain COMPARISON: 10/16/2018 TECHNIQUE: CT scan of the abdomen and pelvis was performed without oral or IV contrast. Multiplanar reformats were obtained. Dose reduction techniques were used. CONTRAST: None. FINDINGS: LOWER CHEST: Coronary artery disease. HEPATOBILIARY:  Partially calcified cystic lesion in the posterior right hepatic lobe, unchanged and most likely benign. Again seen is a 1.6 cm lesion in the lateral left hepatic lobe which has not changed and is presumably a cyst. No follow-up is required. PANCREAS: Normal. SPLEEN: Normal. ADRENAL GLANDS: A left adrenal adenoma does not require follow-up. KIDNEY/BLADDER: A cystic lesion in the right kidney does not require follow-up. There is mild right and left collecting system fullness extending to the ureterovesical junction. No renal or ureteral calculi. The urinary bladder is distended. BOWEL: Normal stomach. Normal caliber of the small bowel. The appendix is not discretely visualized. There is a large amount of stool throughout the colon, with a large amount of stool in the rectum. LYMPH NODES: Normal. VASCULATURE: Atherosclerotic disease. PELVIC ORGANS: Normal. MUSCULOSKELETAL: Normal.     1.  Large amount of stool throughout the colon with a large amount of stool in the rectum. 2.  Fullness of the renal collecting systems may be transient. The urinary bladder is distended. No renal or ureteral calculi identified.      Xr Chest 2 Views    Result Date: 7/9/2021  EXAM: XR CHEST 2 VIEWS LOCATION: [facility] DATE/TIME: 7/9/2021 11:48 AM INDICATION: confusion COMPARISON: 6/18/2020     Negative chest.    Ct Head Without Contrast    Result Date: 7/9/2021  EXAM: CT HEAD WO CONTRAST LOCATION: [facility] DATE/TIME: 7/9/2021 11:39 AM INDICATION: confusion COMPARISON: Brain MRI 06/22/2020 TECHNIQUE: Routine CT Head without IV contrast. Multiplanar reformats. Dose reduction techniques were used. FINDINGS: INTRACRANIAL CONTENTS: No intracranial hemorrhage. Mild diffuse cerebral parenchymal volume loss. No midline shift. The basilar cisterns are patent. Mild periventricular and scattered foci of deep white matter hypodensities involving both cerebral hemispheres. No CT evidence for an acute infarct. No cerebellar tonsillar ectopia. Stable ventricular size. The patient's known chronic lacunae in the right cerebellar hemisphere is better seen on the prior brain MRI. VISUALIZED ORBITS/SINUSES/MASTOIDS: Prior bilateral cataract surgery. Visualized portions of the orbits are otherwise unremarkable. No paranasal sinus mucosal disease. No middle ear or mastoid effusion. BONES/SOFT TISSUES: No acute abnormality.     1.  No intracranial hemorrhage, mass lesions, hydrocephalus or CT evidence for an acute infarct. 2.  Mild diffuse cerebral parenchymal volume loss. Presumed chronic hypertensive/microvascular ischemic white matter changes.          ED COURSE & MEDICAL DECISION MAKING    12:22 PM I was staffed on this case.  12:36 PM I spoke with hospitalist Dr. Boyce about the patient. They accept her for admission,    The case was discussed with the treating Advance Practice Provider. I did also see and exam the patient and I agree with workup and plan.     I am seeing this patient along with LIANNE López.  I, Ricky Ledezma MD have reviewed the documentation, personally taken the patient's  history, performed an exam and agree with the physical finds, diagnosis and management plan.    Working Diagnosis    Final diagnoses:   Fecal impaction in rectum (H)   Slow transit constipation   Confusion   Urinary retention       Final disposition will be made per the results of any diagnostic studies and patient's clinical trajectory.              Ricky Ledezma MD  08/09/21 [id number]       Ricky Ledezma MD  08/09/21 1539

## 2021-08-26 ENCOUNTER — APPOINTMENT (OUTPATIENT)
Dept: CT IMAGING | Facility: HOSPITAL | Age: 81
End: 2021-08-26
Attending: EMERGENCY MEDICINE
Payer: COMMERCIAL

## 2021-08-26 ENCOUNTER — HOSPITAL ENCOUNTER (EMERGENCY)
Facility: HOSPITAL | Age: 81
Discharge: NURSING, REHAB OR LONG-TERM CARE | End: 2021-08-26
Attending: EMERGENCY MEDICINE | Admitting: EMERGENCY MEDICINE
Payer: COMMERCIAL

## 2021-08-26 VITALS
OXYGEN SATURATION: 99 % | HEIGHT: 67 IN | TEMPERATURE: 98.1 F | BODY MASS INDEX: 25.74 KG/M2 | DIASTOLIC BLOOD PRESSURE: 75 MMHG | HEART RATE: 83 BPM | RESPIRATION RATE: 14 BRPM | WEIGHT: 164 LBS | SYSTOLIC BLOOD PRESSURE: 149 MMHG

## 2021-08-26 DIAGNOSIS — R42 DIZZINESS: ICD-10-CM

## 2021-08-26 DIAGNOSIS — R55 SYNCOPE, UNSPECIFIED SYNCOPE TYPE: ICD-10-CM

## 2021-08-26 LAB
ANION GAP SERPL CALCULATED.3IONS-SCNC: 11 MMOL/L (ref 5–18)
ATRIAL RATE - MUSE: 91 BPM
BASOPHILS # BLD AUTO: 0 10E3/UL (ref 0–0.2)
BASOPHILS NFR BLD AUTO: 0 %
BUN SERPL-MCNC: 13 MG/DL (ref 8–28)
CALCIUM SERPL-MCNC: 10.1 MG/DL (ref 8.5–10.5)
CHLORIDE BLD-SCNC: 101 MMOL/L (ref 98–107)
CO2 SERPL-SCNC: 24 MMOL/L (ref 22–31)
CREAT SERPL-MCNC: 0.95 MG/DL (ref 0.6–1.1)
DIASTOLIC BLOOD PRESSURE - MUSE: 82 MMHG
EOSINOPHIL # BLD AUTO: 0 10E3/UL (ref 0–0.7)
EOSINOPHIL NFR BLD AUTO: 0 %
ERYTHROCYTE [DISTWIDTH] IN BLOOD BY AUTOMATED COUNT: 11.9 % (ref 10–15)
GFR SERPL CREATININE-BSD FRML MDRD: 56 ML/MIN/1.73M2
GLUCOSE BLD-MCNC: 111 MG/DL (ref 70–125)
HCT VFR BLD AUTO: 41.3 % (ref 35–47)
HGB BLD-MCNC: 13.8 G/DL (ref 11.7–15.7)
HOLD SPECIMEN: NORMAL
IMM GRANULOCYTES # BLD: 0 10E3/UL
IMM GRANULOCYTES NFR BLD: 0 %
INTERPRETATION ECG - MUSE: NORMAL
LYMPHOCYTES # BLD AUTO: 1 10E3/UL (ref 0.8–5.3)
LYMPHOCYTES NFR BLD AUTO: 15 %
MAGNESIUM SERPL-MCNC: 2.1 MG/DL (ref 1.8–2.6)
MCH RBC QN AUTO: 31.7 PG (ref 26.5–33)
MCHC RBC AUTO-ENTMCNC: 33.4 G/DL (ref 31.5–36.5)
MCV RBC AUTO: 95 FL (ref 78–100)
MONOCYTES # BLD AUTO: 0.7 10E3/UL (ref 0–1.3)
MONOCYTES NFR BLD AUTO: 10 %
NEUTROPHILS # BLD AUTO: 5.1 10E3/UL (ref 1.6–8.3)
NEUTROPHILS NFR BLD AUTO: 75 %
NRBC # BLD AUTO: 0 10E3/UL
NRBC BLD AUTO-RTO: 0 /100
P AXIS - MUSE: 60 DEGREES
PLATELET # BLD AUTO: 276 10E3/UL (ref 150–450)
POTASSIUM BLD-SCNC: 4.1 MMOL/L (ref 3.5–5)
PR INTERVAL - MUSE: 184 MS
QRS DURATION - MUSE: 74 MS
QT - MUSE: 370 MS
QTC - MUSE: 455 MS
R AXIS - MUSE: 4 DEGREES
RBC # BLD AUTO: 4.35 10E6/UL (ref 3.8–5.2)
SODIUM SERPL-SCNC: 136 MMOL/L (ref 136–145)
SYSTOLIC BLOOD PRESSURE - MUSE: 149 MMHG
T AXIS - MUSE: 68 DEGREES
TROPONIN I SERPL-MCNC: <0.01 NG/ML (ref 0–0.29)
VENTRICULAR RATE- MUSE: 91 BPM
WBC # BLD AUTO: 6.7 10E3/UL (ref 4–11)

## 2021-08-26 PROCEDURE — 96361 HYDRATE IV INFUSION ADD-ON: CPT

## 2021-08-26 PROCEDURE — 84484 ASSAY OF TROPONIN QUANT: CPT | Performed by: EMERGENCY MEDICINE

## 2021-08-26 PROCEDURE — 93005 ELECTROCARDIOGRAM TRACING: CPT | Performed by: EMERGENCY MEDICINE

## 2021-08-26 PROCEDURE — 258N000003 HC RX IP 258 OP 636: Performed by: EMERGENCY MEDICINE

## 2021-08-26 PROCEDURE — 70450 CT HEAD/BRAIN W/O DYE: CPT

## 2021-08-26 PROCEDURE — 85025 COMPLETE CBC W/AUTO DIFF WBC: CPT | Performed by: EMERGENCY MEDICINE

## 2021-08-26 PROCEDURE — 99285 EMERGENCY DEPT VISIT HI MDM: CPT | Mod: 25

## 2021-08-26 PROCEDURE — 36415 COLL VENOUS BLD VENIPUNCTURE: CPT | Performed by: EMERGENCY MEDICINE

## 2021-08-26 PROCEDURE — 83735 ASSAY OF MAGNESIUM: CPT | Performed by: EMERGENCY MEDICINE

## 2021-08-26 PROCEDURE — 80048 BASIC METABOLIC PNL TOTAL CA: CPT | Performed by: EMERGENCY MEDICINE

## 2021-08-26 PROCEDURE — 96360 HYDRATION IV INFUSION INIT: CPT

## 2021-08-26 RX ADMIN — SODIUM CHLORIDE 1000 ML: 9 INJECTION, SOLUTION INTRAVENOUS at 10:49

## 2021-08-26 ASSESSMENT — ENCOUNTER SYMPTOMS
SHORTNESS OF BREATH: 0
LIGHT-HEADEDNESS: 1
ABDOMINAL PAIN: 0
ARTHRALGIAS: 0
NECK PAIN: 0
VOMITING: 0
FATIGUE: 0
RHINORRHEA: 0
COLOR CHANGE: 0
HEMATURIA: 0
JOINT SWELLING: 0
ACTIVITY CHANGE: 0
MYALGIAS: 0
APPETITE CHANGE: 0
WHEEZING: 0
HEADACHES: 0
FLANK PAIN: 0
BACK PAIN: 0
NAUSEA: 0
DIARRHEA: 0
COUGH: 0
FEVER: 0

## 2021-08-26 ASSESSMENT — MIFFLIN-ST. JEOR: SCORE: 1241.53

## 2021-08-26 NOTE — ED NOTES
Daughter, Frida on phone.  Prefers to take pt home herself.  Transport cancelled by charge, rn.  Frida to be here in about 45min.  Pt notified.

## 2021-08-26 NOTE — ED NOTES
Bed: JNED-01  Expected date:   Expected time:   Means of arrival: Ambulance  Comments:  WBL, Syncope

## 2021-08-26 NOTE — ED TRIAGE NOTES
Pt brought in by [facility] from [facility]. Pt had an episode of syncope after standing up. Pt denied hitting head. Remembers falling and not losing consciousness.Pt reports history of syncopy. Awake and alert on arrival. Vital stable. GCS 15.

## 2021-08-26 NOTE — DISCHARGE INSTRUCTIONS
You have worsening dizziness, gout, develop chest pain or if you have concerns return to the ER for evaluation.    A referral has been placed to outpatient cardiology clinic will call you to set up an appointment.

## 2021-08-26 NOTE — ED PROVIDER NOTES
EMERGENCY DEPARTMENT ENCOUNTER      NAME: Alice Lawson  AGE: 81 year old female  YOB: 1940  MRN: [id number]  EVALUATION DATE & TIME: 8/26/2021 10:29 AM    PCP: Chelle Casanova    ED PROVIDER: Dale Kulkarni D.O.      Chief Complaint   Patient presents with     Syncope       FINAL IMPRESSION:  1. Dizziness    2. Syncope, unspecified syncope type        ED COURSE & MEDICAL DECISION MAKING:    Pertinent Labs & Imaging studies reviewed. (See chart for details)  81 year old female presents to the Emergency Department for evaluation of syncope.  Patient reports this morning she stood up from the couch while walking in the door was feeling lightheaded and then may have passed out.  States that unsure if she hit her head and is unsure of LOC.  It seems that patient has had a history of this previously.  Has concerns for potential dehydration.  She denies any chest pain, shortness of breath she does not have a pacemaker.  EKG is reassuring.  Cardiac work-up completed as well as a CT scan of the head given concern for potential head trauma.    Patient's cardiac work-up was negative, EKG reassuring, no signs of arrhythmias on telemetry.  Patient is ambulatory in the ER without any dizziness.  She still denying chest pain.  I do discussed potential observation admission however patient states she is feeling better would prefer going home.  Referral placed to rapid access clinic as an outpatient.    10:34 AM I met with the patient to gather history and to perform my initial exam. I discussed the plan for care while in the Emergency Department. PPE (protective eyewear, gloves, surgical cap, and N95 mask) was worn by me during patient encounters while patient wore mask.   11:58 AM I rechecked and updated the patient.  12:39 PM I rechecked the patient. Patient was up and ambulating without dizziness or lightheadedness. Patient states that she is feeling improved and would like to go home. I discussed the plan for  "discharge with the patient, and patient is agreeable. We discussed supportive cares at home and reasons for return to the ER including new or worsening symptoms - all questions and concerns addressed. Patient to be discharged by RN.     At the conclusion of the encounter I discussed the results of all of the tests and the disposition. The questions were answered. The patient or family acknowledged understanding and was agreeable with the care plan.     0 minutes of critical care time     MEDICATIONS GIVEN IN THE EMERGENCY:  Medications   0.9% sodium chloride BOLUS (1,000 mLs Intravenous New Bag 8/26/21 1049)       NEW PRESCRIPTIONS STARTED AT TODAY'S ER VISIT  New Prescriptions    No medications on file     =================================================================    HPI    Patient information was obtained from: Patient and EMS    Use of : N/A     Alice Lawson is a 81 year old female with a pertinent history of hypertension, melanoma, and Parkinson's disease who presents to this ED via EMS for evaluation after a syncopal fall.     Per EMS, patient had syncopal fall 10 minutes prior to arrival. Initial BP 96/52, later 111/53. Blood sugar 105.     The patient reports standing up from sitting to answer her door this morning when she had sudden onset of lightheadedness and dropped to the floor in her doorway. Patient does not think that she hit her head and is unsure if she passed out. Patient has a 2 year history of intermittent syncopal episodes that she is usually able to feel coming and prepare by sitting or laying down. Patient states that this episode is different because it \"happened quickly\" and did not allow her time to sit or lay down before she blacked out. Here in the ED, she denies and pain or injury but endorses feeling \"woozy\", which usually resolves quickly after a syncopal episode. Patient notes that she may be dehydrated and that her family has recently been encouraging her to " drink more water. Patient denies chest pain, shortness of breath, back pain, neck pain, hip pain, and any other symptoms or complaints at this time.     Patient denies use of blood thinners.       REVIEW OF SYSTEMS   Review of Systems   Constitutional: Negative for activity change, appetite change, fatigue and fever.        Positive for syncopal fall   HENT: Negative for congestion and rhinorrhea.    Respiratory: Negative for cough, shortness of breath and wheezing.    Cardiovascular: Negative for chest pain and leg swelling.   Gastrointestinal: Negative for abdominal pain, diarrhea, nausea and vomiting.   Genitourinary: Negative for flank pain, hematuria and urgency.   Musculoskeletal: Negative for arthralgias, back pain, joint swelling, myalgias and neck pain.   Skin: Negative for color change and rash.   Neurological: Positive for light-headedness. Negative for syncope and headaches.   Psychiatric/Behavioral: Negative for self-injury and suicidal ideas.   All other systems reviewed and are negative.      PAST MEDICAL HISTORY:  Past Medical History:   Diagnosis Date     Arthritis      Hypertension      Melanoma (H)        PAST SURGICAL HISTORY:  Past Surgical History:   Procedure Laterality Date     ARTHROSCOPY KNEE Bilateral      CATARACT EXTRACTION Bilateral     2014     NECK SURGERY       REPLACEMENT TOTAL KNEE Left 1/5/2017    Procedure: LEFT TOTAL KNEE ARTHROPLASTY COMPUTER ASSIST;  Surgeon: Micah Gallagher MD;  Location: [facility] OR;  Service:        CURRENT MEDICATIONS:    No current facility-administered medications for this encounter.     Current Outpatient Medications   Medication     artificial tears OINT ophthalmic ointment     carbidopa-levodopa (SINEMET)  mg per tablet     cholecalciferol, vitamin D3, 5,000 unit Tab     cyanocobalamin (VITAMIN B-12) 1000 MCG tablet     docusate sodium (COLACE) 100 MG capsule     estradioL (ESTRACE) 0.01 % (0.1 mg/gram) vaginal cream     estradiol  (ESTRACE) 0.1 MG/GM vaginal cream     fish oil-omega-3 fatty acids (FISH OIL) 300-1,000 mg capsule     nitrofurantoin (MACRODANTIN) 50 MG capsule     peg 400-propylene glycol PF (SYSTANE) 0.4-0.3 % Dpet     polyethylene glycol (MIRALAX) 17 g packet     polyethylene glycol (MIRALAX) 17 gram packet     polyethylene glycol-propylene glycol (SYSTANE ULTRA) 0.4-0.3 % SOLN ophthalmic solution     tamsulosin (FLOMAX) 0.4 mg cap     tamsulosin (FLOMAX) 0.4 MG capsule     Vitamin D3 (CHOLECALCIFEROL) 125 MCG (5000 UT) tablet       ALLERGIES:  Allergies   Allergen Reactions     Hydrochlorothiazide Other (See Comments)     Foggy feeling (electrolyte imbalance and ended up in hospital)     Rosuvastatin Unknown     Other reaction(s): *Unknown, caused abd wall pain       FAMILY HISTORY:  Family History   Problem Relation Age of Onset     Coronary Artery Disease Mother      Hypertension Mother      Diabetes Sister        SOCIAL HISTORY:   Social History     Socioeconomic History     Marital status:      Spouse name: Not on file     Number of children: Not on file     Years of education: Not on file     Highest education level: Not on file   Occupational History     Not on file   Tobacco Use     Smoking status: Never Smoker     Smokeless tobacco: Never Used   Substance and Sexual Activity     Alcohol use: No     Drug use: No     Sexual activity: Not on file   Other Topics Concern     Not on file   Social History Narrative     Not on file     Social Determinants of Health     Financial Resource Strain:      Difficulty of Paying Living Expenses:    Food Insecurity:      Worried About Running Out of Food in the Last Year:      Ran Out of Food in the Last Year:    Transportation Needs:      Lack of Transportation (Medical):      Lack of Transportation (Non-Medical):    Physical Activity:      Days of Exercise per Week:      Minutes of Exercise per Session:    Stress:      Feeling of Stress :    Social Connections:       "Frequency of Communication with Friends and Family:      Frequency of Social Gatherings with Friends and Family:      Attends Anabaptism Services:      Active Member of Clubs or Organizations:      Attends Club or Organization Meetings:      Marital Status:    Intimate Partner Violence:      Fear of Current or Ex-Partner:      Emotionally Abused:      Physically Abused:      Sexually Abused:        VITALS:  BP (!) 149/82   Pulse 81   Temp 98.1  F (36.7  C) (Oral)   Resp 20   Ht 1.702 m (5' 7\")   Wt 74.4 kg (164 lb)   BMI 25.69 kg/m      PHYSICAL EXAM    Physical Exam  Vitals and nursing note reviewed.   Constitutional:       General: She is not in acute distress.     Appearance: Normal appearance. She is not ill-appearing.   HENT:      Head: Normocephalic and atraumatic.      Right Ear: External ear normal.      Left Ear: External ear normal.      Nose: Nose normal.      Mouth/Throat:      Mouth: Mucous membranes are moist.   Eyes:      Extraocular Movements: Extraocular movements intact.      Pupils: Pupils are equal, round, and reactive to light.   Cardiovascular:      Rate and Rhythm: Normal rate and regular rhythm.   Pulmonary:      Effort: Pulmonary effort is normal. No respiratory distress.      Breath sounds: Normal breath sounds. No stridor. No wheezing.   Abdominal:      General: There is no distension.      Palpations: Abdomen is soft. There is no mass.      Tenderness: There is no abdominal tenderness. There is no guarding or rebound.      Hernia: No hernia is present.   Musculoskeletal:         General: No swelling, tenderness or signs of injury. Normal range of motion.      Cervical back: Normal range of motion.   Skin:     General: Skin is warm and dry.      Capillary Refill: Capillary refill takes less than 2 seconds.   Neurological:      General: No focal deficit present.      Mental Status: She is alert and oriented to person, place, and time.      Cranial Nerves: No cranial nerve deficit.      " Motor: No weakness.      Coordination: Coordination normal.      Gait: Gait normal.   Psychiatric:         Mood and Affect: Mood normal.       LAB:  All pertinent labs reviewed and interpreted.  Labs Ordered and Resulted from Time of ED Arrival Up to the Time of Departure from the ED   BASIC METABOLIC PANEL - Abnormal; Notable for the following components:       Result Value    GFR Estimate 56 (*)     All other components within normal limits   TROPONIN I - Normal   MAGNESIUM - Normal   CBC WITH PLATELETS & DIFFERENTIAL    Narrative:     The following orders were created for panel order CBC with platelets differential.  Procedure                               Abnormality         Status                     ---------                               -----------         ------                     CBC with platelets and d...[[id number]]                      Final result                 Please view results for these tests on the individual orders.   CBC WITH PLATELETS AND DIFFERENTIAL   EXTRA BLUE TOP TUBE   PERIPHERAL IV CATHETER   CARDIAC CONTINUOUS MONITORING   PULSE OXIMETRY NURSING   EXTRA TUBE    Narrative:     The following orders were created for panel order [place] Draw.  Procedure                               Abnormality         Status                     ---------                               -----------         ------                     Extra Blue Top Tube[[id number]]                              Final result                 Please view results for these tests on the individual orders.       RADIOLOGY:  Reviewed all pertinent imaging. Please see official radiology report.  Head CT w/o contrast   Final Result   IMPRESSION:   1.  No CT evidence for acute intracranial process.   2.  Brain atrophy and presumed chronic microvascular ischemic changes as above.   3.  No mass, mass effect or evidence for acute infarction.   4.  Overall stable appearance from 07/09/2021 intracranially.          EKG:    Performed at:  26-AUG-2021 10:35:02    Impression: Sinus rhythm with sinus arrhythmia. Normal ECG. No STEMI.     Rate: 91 bpm  Axis: 4  TN Interval: 184 ms  QRS Interval: 74 ms  QTc Interval: 455 ms    Comparison: When compared with ECG of 21-JUN-2021 10:35:02, no significant change was found.     I have independently reviewed and interpreted the EKG(s) documented above.      I, Jocelyne Parker, am serving as a scribe to document services personally performed by Dr. Dale Kulkarni based on my observation and the provider's statements to me. I, Dale Kulkarni, DO attest that Jocelyne Parker is acting in a scribe capacity, has observed my performance of the services and has documented them in accordance with my direction.    Dale Kulkarni D.O.  Emergency Medicine  [facility] [place], [place] EMERGENCY DEPARTMENT  [address] [place] [id number]  [phone]  Dept: [phone]       Dale Kulkarni DO  08/26/21 1242

## 2021-09-08 ENCOUNTER — LAB REQUISITION (OUTPATIENT)
Dept: LAB | Facility: CLINIC | Age: 81
End: 2021-09-08

## 2021-09-08 DIAGNOSIS — R30.0 DYSURIA: ICD-10-CM

## 2021-09-08 PROCEDURE — 87086 URINE CULTURE/COLONY COUNT: CPT | Performed by: FAMILY MEDICINE

## 2021-09-09 LAB — BACTERIA UR CULT: NO GROWTH

## 2021-09-29 ENCOUNTER — MEDICAL CORRESPONDENCE (OUTPATIENT)
Dept: HEALTH INFORMATION MANAGEMENT | Facility: CLINIC | Age: 81
End: 2021-09-29

## 2021-09-29 ENCOUNTER — HOSPITAL ENCOUNTER (EMERGENCY)
Facility: HOSPITAL | Age: 81
Discharge: HOME | End: 2021-09-29
Attending: EMERGENCY MEDICINE | Admitting: EMERGENCY MEDICINE
Payer: COMMERCIAL

## 2021-09-29 ENCOUNTER — APPOINTMENT (OUTPATIENT)
Dept: ULTRASOUND IMAGING | Facility: HOSPITAL | Age: 81
End: 2021-09-29
Attending: EMERGENCY MEDICINE
Payer: COMMERCIAL

## 2021-09-29 VITALS
DIASTOLIC BLOOD PRESSURE: 86 MMHG | HEART RATE: 73 BPM | WEIGHT: 164 LBS | BODY MASS INDEX: 25.69 KG/M2 | TEMPERATURE: 97.8 F | OXYGEN SATURATION: 99 % | SYSTOLIC BLOOD PRESSURE: 170 MMHG | RESPIRATION RATE: 20 BRPM

## 2021-09-29 DIAGNOSIS — I82.461 ACUTE DEEP VEIN THROMBOSIS (DVT) OF CALF MUSCLE VEIN OF RIGHT LOWER EXTREMITY (H): ICD-10-CM

## 2021-09-29 LAB
ALBUMIN UR-MCNC: NEGATIVE MG/DL
ANION GAP SERPL CALCULATED.3IONS-SCNC: 9 MMOL/L (ref 5–18)
APPEARANCE UR: CLEAR
BILIRUB UR QL STRIP: NEGATIVE
BUN SERPL-MCNC: 16 MG/DL (ref 8–28)
CALCIUM SERPL-MCNC: 9.8 MG/DL (ref 8.5–10.5)
CHLORIDE BLD-SCNC: 103 MMOL/L (ref 98–107)
CO2 SERPL-SCNC: 25 MMOL/L (ref 22–31)
COLOR UR AUTO: YELLOW
CREAT SERPL-MCNC: 0.85 MG/DL (ref 0.6–1.1)
ERYTHROCYTE [DISTWIDTH] IN BLOOD BY AUTOMATED COUNT: 12.3 % (ref 10–15)
GFR SERPL CREATININE-BSD FRML MDRD: 64 ML/MIN/1.73M2
GLUCOSE BLD-MCNC: 102 MG/DL (ref 70–125)
GLUCOSE UR STRIP-MCNC: NEGATIVE MG/DL
HCT VFR BLD AUTO: 41.1 % (ref 35–47)
HGB BLD-MCNC: 13.8 G/DL (ref 11.7–15.7)
HGB UR QL STRIP: NEGATIVE
KETONES UR STRIP-MCNC: NEGATIVE MG/DL
LEUKOCYTE ESTERASE UR QL STRIP: ABNORMAL
MCH RBC QN AUTO: 32.1 PG (ref 26.5–33)
MCHC RBC AUTO-ENTMCNC: 33.6 G/DL (ref 31.5–36.5)
MCV RBC AUTO: 96 FL (ref 78–100)
NITRATE UR QL: NEGATIVE
PH UR STRIP: 5.5 [PH] (ref 5–7)
PLATELET # BLD AUTO: 260 10E3/UL (ref 150–450)
POTASSIUM BLD-SCNC: 4.1 MMOL/L (ref 3.5–5)
RBC # BLD AUTO: 4.3 10E6/UL (ref 3.8–5.2)
RBC URINE: <2 /HPF
SODIUM SERPL-SCNC: 137 MMOL/L (ref 136–145)
SP GR UR STRIP: 1.02 (ref 1–1.03)
UROBILINOGEN UR STRIP-MCNC: NORMAL MG/DL
WBC # BLD AUTO: 6.5 10E3/UL (ref 4–11)
WBC URINE: <5 /HPF

## 2021-09-29 PROCEDURE — 250N000013 HC RX MED GY IP 250 OP 250 PS 637: Performed by: EMERGENCY MEDICINE

## 2021-09-29 PROCEDURE — 80048 BASIC METABOLIC PNL TOTAL CA: CPT | Performed by: EMERGENCY MEDICINE

## 2021-09-29 PROCEDURE — 93971 EXTREMITY STUDY: CPT | Mod: RT

## 2021-09-29 PROCEDURE — 85027 COMPLETE CBC AUTOMATED: CPT | Performed by: EMERGENCY MEDICINE

## 2021-09-29 PROCEDURE — 99284 EMERGENCY DEPT VISIT MOD MDM: CPT | Mod: 25

## 2021-09-29 PROCEDURE — 81001 URINALYSIS AUTO W/SCOPE: CPT | Performed by: STUDENT IN AN ORGANIZED HEALTH CARE EDUCATION/TRAINING PROGRAM

## 2021-09-29 PROCEDURE — 81001 URINALYSIS AUTO W/SCOPE: CPT | Performed by: EMERGENCY MEDICINE

## 2021-09-29 PROCEDURE — 36415 COLL VENOUS BLD VENIPUNCTURE: CPT | Performed by: EMERGENCY MEDICINE

## 2021-09-29 RX ADMIN — RIVAROXABAN 15 MG: 15 TABLET, FILM COATED ORAL at 21:25

## 2021-09-29 NOTE — ED TRIAGE NOTES
Pt arrives via EMS for c/o urinary frequency, urinary burning and short term memory loss.  Pt states she has hx of UTI's and feels as if this could be another UTI.  Pt also states right leg pain and swelling.  Denies any shortness of breath or chest pain.

## 2021-09-30 NOTE — ED PROVIDER NOTES
EMERGENCY DEPARTMENT ENCOUNTER            IMPRESSION:  Blood pressure variability  Irritative voiding symptoms  DVT      MEDICAL DECISION MAKING:  Patient brought in by ambulance from [facility].  The nurses today noted variability in her blood pressure.  Patient was mostly asymptomatic.  Patient also reported some irritative voiding symptoms and was concerned about UTI.  She is also noticed some swelling of the right calf.  No breathing symptoms    On exam her blood pressure is elevated but otherwise vital signs are normal.  She is awake and alert and responsive.  Normal cardiopulmonary exam.  The right lower extremity is slightly asymmetry larger than the other.    Chemistry and CBC and urinalysis did not show evidence of infection.    Right lower extremity ultrasound shows a small DVT.  Patient was given a dose of Xarelto.  She will be discharged home with 4-day prescription for Xarelto.    Patient is to follow-up with primary care within 2 weeks      =================================================================  CHIEF COMPLAINT:  Chief Complaint   Patient presents with     Urinary Frequency     Memory Loss         HPI  Alice Lawson is a 81 year old female with a history of HTN, UTIs, and Parkinsons who presents to the ED by EMS with her daughter for evaluation of low blood pressure. Patient lives at a senior care facility in the assisted living section. Patient has limited mobility due to painful spinal stenosis. She has frequent dizziness and syncopic episodes when she stands up which is normal for her. Additionally, her daughter stated that she has a history of fluctuating blood pressure when she does not drink enough fluids.    Today, a staff member at her facility took her blood pressure while she was standing up and found it to be really low. Then, her blood pressure reportedly went up while she was sitting down. This concerned the nurse and she called the ambulance.  Patient also had a low grade fever last night at 99 F. Patient gets frequent UTIs, but states that she is not having any current symptoms similar to her prior UTIs. Additionally, patient has some increased left lower extremity swelling. She denies any additional symptoms at this time.    I, Nan Bergman am serving as a scribe to document services personally performed by Dr. Mckay Vitale MD, based on my observation and the provider's statements to me. I, Dr. Mckay Vitale MD attest that Nan Bergman is acting in a scribe capacity, has observed my performance of the services and has documented them in accordance with my direction.      REVIEW OF SYSTEMS   Constitutional: Denies fever, chills, unintentional weight loss or fatigue   Eyes: Denies visual changes or discharge    HENT: Denies sore throat, ear pain or neck pain  Respiratory: Denies cough or shortness of breath    Cardiovascular: Denies chest pain, palpitations or leg swelling  GI: Denies abdominal pain, nausea, vomiting, or dark, bloody stools.    : Denies hematuria, dysuria, or flank pain  Musculoskeletal: Positive left lower extremity swelling. Denies any new back pain or new muscle/joint pains  Skin: Denies rash or wound  Neurologic: Denies current headache, new weakness, focal weakness, or sensory changes    Lymphatic: Denies swollen glands    Psychiatric: Denies depression, suicidal ideation or homicidal ideation.    Remainder of systems reviewed, unless noted in HPI all others negative.    PAST MEDICAL HISTORY:  Past Medical History:   Diagnosis Date     Arthritis      Hypertension      Melanoma (H)        PAST SURGICAL HISTORY:  Past Surgical History:   Procedure Laterality Date     ARTHROSCOPY KNEE Bilateral      CATARACT EXTRACTION Bilateral     2014     NECK SURGERY       REPLACEMENT TOTAL KNEE Left 1/5/2017    Procedure: LEFT TOTAL KNEE ARTHROPLASTY COMPUTER ASSIST;  Surgeon: Micah Gallagher MD;  Location: [facility];  Service:         CURRENT MEDICATIONS:    rivaroxaban ANTICOAGULANT (XARELTO) 15 MG TABS tablet  artificial tears OINT ophthalmic ointment  carbidopa-levodopa (SINEMET)  mg per tablet  cholecalciferol, vitamin D3, 5,000 unit Tab  cyanocobalamin (VITAMIN B-12) 1000 MCG tablet  docusate sodium (COLACE) 100 MG capsule  estradioL (ESTRACE) 0.01 % (0.1 mg/gram) vaginal cream  estradiol (ESTRACE) 0.1 MG/GM vaginal cream  fish oil-omega-3 fatty acids (FISH OIL) 300-1,000 mg capsule  nitrofurantoin (MACRODANTIN) 50 MG capsule  peg 400-propylene glycol PF (SYSTANE) 0.4-0.3 % Dpet  polyethylene glycol (MIRALAX) 17 g packet  polyethylene glycol (MIRALAX) 17 gram packet  polyethylene glycol-propylene glycol (SYSTANE ULTRA) 0.4-0.3 % SOLN ophthalmic solution  tamsulosin (FLOMAX) 0.4 mg cap  tamsulosin (FLOMAX) 0.4 MG capsule  Vitamin D3 (CHOLECALCIFEROL) 125 MCG (5000 UT) tablet      ALLERGIES:  Allergies   Allergen Reactions     Hydrochlorothiazide Other (See Comments)     Foggy feeling (electrolyte imbalance and ended up in hospital)     Rosuvastatin Unknown     Other reaction(s): *Unknown, caused abd wall pain       FAMILY HISTORY:  Family History   Problem Relation Age of Onset     Coronary Artery Disease Mother      Hypertension Mother      Diabetes Sister        SOCIAL HISTORY:   Social History     Socioeconomic History     Marital status:      Spouse name: Not on file     Number of children: Not on file     Years of education: Not on file     Highest education level: Not on file   Occupational History     Not on file   Tobacco Use     Smoking status: Never Smoker     Smokeless tobacco: Never Used   Substance and Sexual Activity     Alcohol use: No     Drug use: No     Sexual activity: Not on file   Other Topics Concern     Not on file   Social History Narrative     Not on file     Social Determinants of Health     Financial Resource Strain:      Difficulty of Paying Living Expenses:    Food Insecurity:      Worried  About Running Out of Food in the Last Year:      Ran Out of Food in the Last Year:    Transportation Needs:      Lack of Transportation (Medical):      Lack of Transportation (Non-Medical):    Physical Activity:      Days of Exercise per Week:      Minutes of Exercise per Session:    Stress:      Feeling of Stress :    Social Connections:      Frequency of Communication with Friends and Family:      Frequency of Social Gatherings with Friends and Family:      Attends [employer] Services:      Active Member of Clubs or Organizations:      Attends Club or Organization Meetings:      Marital Status:    Intimate Partner Violence:      Fear of Current or Ex-Partner:      Emotionally Abused:      Physically Abused:      Sexually Abused:        PHYSICAL EXAM:    BP (!) 159/68   Pulse 86   Temp 97.8  F (36.6  C) (Temporal)   Resp 20   Wt 74.4 kg (164 lb)   SpO2 99%   BMI 25.69 kg/m      Constitutional: Awake, alert, in no acute distress, some hard of hearing  Head: Normocephalic, atraumatic.  ENT: Mucous membranes moist. Posterior oropharynx appears normal.  Eyes: Pupils midrange and reactive ,no conjunctival discharge  Neck: No lymphadenopathy, no stridor, supple, soft tissue swelling  Respiratory: Respirations even, unlabored. Lungs clear to ascultation bilaterally, in no acute respiratory distress.  Cardiovascular: Regular rate and rhythm.+2 radial pulses, equal bilaterally.  No murmurs.   GI: Abdomen soft, non-tender to palpation in all 4 quadrants. No guarding or rebound. Bowel sounds intact on all 4 quadrants.   Back: No CVA tenderness.    Musculoskeletal: Right lower extremity slightly larger circumference than left  Integument: No erythema  Lymphatic: No cervical lymphadenopathy  Neurologic: Alert & oriented x 3. Normal speech. Grossly normal motor and sensory function. No focal deficits noted.  Psychiatric: Normal mood and affect. Normal judgement.    ED COURSE:    7:51 PM I met with the patient, obtained an  initial history, performed an examination and discussed the plan. PPE worn throughout all interactions with the patient, including surgical mask, gloves.   We discussed the plan for discharge and the patient is agreeable. Reviewed supportive cares, symptomatic treatment, outpatient follow up, and reasons to return to the Emergency Department. Patient to be discharged by ED RN.     LAB:  All pertinent labs reviewed and interpreted.  Results for orders placed or performed during the hospital encounter of 09/29/21   US Lower Extremity Venous Duplex Right    Impression    IMPRESSION:  1.  No femoropopliteal DVT.  2.  Short segment of right calf mid DVT involving one of the paired posterior tibial veins.    Report called to Dr. Lowery at 1635 hours.   UA with Microscopic reflex to Culture    Specimen: Urine, Clean Catch   Result Value Ref Range    Color Urine Yellow Colorless, Straw, Light Yellow, Yellow    Appearance Urine Clear Clear    Glucose Urine Negative Negative mg/dL    Bilirubin Urine Negative Negative    Ketones Urine Negative Negative mg/dL    Specific Gravity Urine 1.025 1.003 - 1.035    Blood Urine Negative Negative    pH Urine 5.5 5.0 - 7.0    Protein Albumin Urine Negative Negative mg/dL    Urobilinogen Urine Normal Normal, 2.0 mg/dL    Nitrite Urine Negative Negative    Leukocyte Esterase Urine Trace (A) Negative    RBC Urine <2 <=2 /HPF    WBC Urine <5 <=5 /HPF   CBC (+ platelets, no diff)   Result Value Ref Range    WBC Count 6.5 4.0 - 11.0 10e3/uL    RBC Count 4.30 3.80 - 5.20 10e6/uL    Hemoglobin 13.8 11.7 - 15.7 g/dL    Hematocrit 41.1 35.0 - 47.0 %    MCV 96 78 - 100 fL    MCH 32.1 26.5 - 33.0 pg    MCHC 33.6 31.5 - 36.5 g/dL    RDW 12.3 10.0 - 15.0 %    Platelet Count 260 150 - 450 10e3/uL   Basic metabolic panel   Result Value Ref Range    Sodium 137 136 - 145 mmol/L    Potassium 4.1 3.5 - 5.0 mmol/L    Chloride 103 98 - 107 mmol/L    Carbon Dioxide (CO2) 25 22 - 31 mmol/L    Anion Gap 9 5 - 18  mmol/L    Urea Nitrogen 16 8 - 28 mg/dL    Creatinine 0.85 0.60 - 1.10 mg/dL    Calcium 9.8 8.5 - 10.5 mg/dL    Glucose 102 70 - 125 mg/dL    GFR Estimate 64 >60 mL/min/1.73m2       RADIOLOGY:  Reviewed all pertinent imaging. Please see official radiology report.  US Lower Extremity Venous Duplex Right   Final Result   IMPRESSION:   1.  No femoropopliteal DVT.   2.  Short segment of right calf mid DVT involving one of the paired posterior tibial veins.      Report called to Dr. Lowery at 1635 hours.             MEDICATIONS GIVEN IN THE EMERGENCY:  Medications   rivaroxaban ANTICOAGULANT (XARELTO) tablet 15 mg (has no administration in time range)           NEW PRESCRIPTIONS STARTED AT TODAY'S ER VISIT:  New Prescriptions    RIVAROXABAN ANTICOAGULANT (XARELTO) 15 MG TABS TABLET    Take 1 tablet (15 mg) by mouth 2 times daily (with meals) for 14 days          FINAL DIAGNOSIS:    ICD-10-CM    1. Acute deep vein thrombosis (DVT) of calf muscle vein of right lower extremity (H)  I82.461           At the conclusion of the encounter I discussed the results of all of the tests and the disposition. The questions were answered. The patient or family acknowledged understanding and was agreeable with the care plan.     NAME: Alice Lawson  AGE: 81 year old female  YOB: 1940  MRN: [id number]  EVALUATION DATE & TIME: 9/29/2021  7:04 PM    PCP: Chelle Casanova    ED PROVIDER: Mckay Vitale M.D.    Nan BROOKS, am serving as a scribe to document services personally performed by Dr. Mckay Vitale based on my observation and the provider's statements to me. I, Mckay Vitale MD attest that Nan Bergman is acting in a scribe capacity, has observed my performance of the services and has documented them in accordance with my direction.    Mckay Vitale M.D.  Emergency Medicine  [facility] EMERGENCY DEPARTMENT  [address] [place] [id number] [phone]  Dept:  [phone]  9/29/2021       Mckay Vitale MD  09/29/21 2049

## 2021-09-30 NOTE — DISCHARGE INSTRUCTIONS
The urinalysis did not show evidence of infection.  You have a small clot in your leg that will need anticoagulation for several months.  I have given you a 14-day subscription.  See your doctor within 2 weeks to have this reevaluated

## 2021-10-11 ENCOUNTER — HEALTH MAINTENANCE LETTER (OUTPATIENT)
Age: 81
End: 2021-10-11

## 2022-02-07 ENCOUNTER — LAB REQUISITION (OUTPATIENT)
Dept: LAB | Facility: CLINIC | Age: 82
End: 2022-02-07
Payer: COMMERCIAL

## 2022-02-07 DIAGNOSIS — R35.89 OTHER POLYURIA: ICD-10-CM

## 2022-02-07 LAB
ALBUMIN UR-MCNC: NEGATIVE MG/DL
APPEARANCE UR: CLEAR
BILIRUB UR QL STRIP: NEGATIVE
CAOX CRY #/AREA URNS HPF: ABNORMAL /HPF
COLOR UR AUTO: YELLOW
GLUCOSE UR STRIP-MCNC: NEGATIVE MG/DL
HGB UR QL STRIP: NEGATIVE
KETONES UR STRIP-MCNC: NEGATIVE MG/DL
LEUKOCYTE ESTERASE UR QL STRIP: NEGATIVE
MUCOUS THREADS #/AREA URNS LPF: PRESENT /LPF
NITRATE UR QL: NEGATIVE
PH UR STRIP: 5.5 [PH] (ref 5–7)
RBC URINE: 0 /HPF
SP GR UR STRIP: 1.02 (ref 1–1.03)
SQUAMOUS EPITHELIAL: 1 /HPF
UROBILINOGEN UR STRIP-MCNC: <2 MG/DL
WBC URINE: <1 /HPF

## 2022-02-07 PROCEDURE — 81001 URINALYSIS AUTO W/SCOPE: CPT | Mod: ORL | Performed by: FAMILY MEDICINE

## 2022-02-07 PROCEDURE — 87086 URINE CULTURE/COLONY COUNT: CPT | Mod: ORL | Performed by: FAMILY MEDICINE

## 2022-02-08 LAB — BACTERIA UR CULT: NO GROWTH

## 2022-04-15 ENCOUNTER — LAB REQUISITION (OUTPATIENT)
Dept: LAB | Facility: CLINIC | Age: 82
End: 2022-04-15
Payer: COMMERCIAL

## 2022-04-15 DIAGNOSIS — R41.0 DISORIENTATION, UNSPECIFIED: ICD-10-CM

## 2022-04-15 LAB
ALBUMIN UR-MCNC: 20 MG/DL
APPEARANCE UR: ABNORMAL
BACTERIA #/AREA URNS HPF: ABNORMAL /HPF
BILIRUB UR QL STRIP: NEGATIVE
CAOX CRY #/AREA URNS HPF: ABNORMAL /HPF
COLOR UR AUTO: YELLOW
GLUCOSE UR STRIP-MCNC: NEGATIVE MG/DL
HGB UR QL STRIP: NEGATIVE
KETONES UR STRIP-MCNC: NEGATIVE MG/DL
LEUKOCYTE ESTERASE UR QL STRIP: NEGATIVE
MUCOUS THREADS #/AREA URNS LPF: PRESENT /LPF
NITRATE UR QL: POSITIVE
PH UR STRIP: 5.5 [PH] (ref 5–7)
RBC URINE: 7 /HPF
SP GR UR STRIP: 1.04 (ref 1–1.03)
SQUAMOUS EPITHELIAL: 1 /HPF
UROBILINOGEN UR STRIP-MCNC: <2 MG/DL
WBC URINE: 16 /HPF

## 2022-04-15 PROCEDURE — 81001 URINALYSIS AUTO W/SCOPE: CPT | Mod: ORL | Performed by: FAMILY MEDICINE

## 2022-04-15 PROCEDURE — 87086 URINE CULTURE/COLONY COUNT: CPT | Mod: ORL | Performed by: FAMILY MEDICINE

## 2022-04-17 ENCOUNTER — LAB REQUISITION (OUTPATIENT)
Dept: LAB | Facility: CLINIC | Age: 82
End: 2022-04-17
Payer: COMMERCIAL

## 2022-04-17 DIAGNOSIS — F03.918 UNSPECIFIED DEMENTIA WITH BEHAVIORAL DISTURBANCE: ICD-10-CM

## 2022-04-17 DIAGNOSIS — E55.9 VITAMIN D DEFICIENCY, UNSPECIFIED: ICD-10-CM

## 2022-04-17 DIAGNOSIS — E53.8 DEFICIENCY OF OTHER SPECIFIED B GROUP VITAMINS: ICD-10-CM

## 2022-04-17 LAB — BACTERIA UR CULT: NORMAL

## 2022-04-19 LAB
ALBUMIN SERPL-MCNC: 3.1 G/DL (ref 3.5–5)
ALP SERPL-CCNC: 47 U/L (ref 45–120)
ALT SERPL W P-5'-P-CCNC: 16 U/L (ref 0–45)
ANION GAP SERPL CALCULATED.3IONS-SCNC: 8 MMOL/L (ref 5–18)
AST SERPL W P-5'-P-CCNC: 17 U/L (ref 0–40)
BILIRUB SERPL-MCNC: 0.3 MG/DL (ref 0–1)
BUN SERPL-MCNC: 25 MG/DL (ref 8–28)
CALCIUM SERPL-MCNC: 9.1 MG/DL (ref 8.5–10.5)
CHLORIDE BLD-SCNC: 109 MMOL/L (ref 98–107)
CO2 SERPL-SCNC: 26 MMOL/L (ref 22–31)
CREAT SERPL-MCNC: 0.8 MG/DL (ref 0.6–1.1)
ERYTHROCYTE [DISTWIDTH] IN BLOOD BY AUTOMATED COUNT: 12.6 % (ref 10–15)
GFR SERPL CREATININE-BSD FRML MDRD: 73 ML/MIN/1.73M2
GLUCOSE BLD-MCNC: 93 MG/DL (ref 70–125)
HCT VFR BLD AUTO: 37.3 % (ref 35–47)
HGB BLD-MCNC: 11.6 G/DL (ref 11.7–15.7)
MCH RBC QN AUTO: 32 PG (ref 26.5–33)
MCHC RBC AUTO-ENTMCNC: 31.1 G/DL (ref 31.5–36.5)
MCV RBC AUTO: 103 FL (ref 78–100)
PLATELET # BLD AUTO: 226 10E3/UL (ref 150–450)
POTASSIUM BLD-SCNC: 3.8 MMOL/L (ref 3.5–5)
PROT SERPL-MCNC: 5.9 G/DL (ref 6–8)
RBC # BLD AUTO: 3.62 10E6/UL (ref 3.8–5.2)
SODIUM SERPL-SCNC: 143 MMOL/L (ref 136–145)
TSH SERPL DL<=0.005 MIU/L-ACNC: 0.49 UIU/ML (ref 0.3–5)
VIT B12 SERPL-MCNC: 1573 PG/ML (ref 213–816)
WBC # BLD AUTO: 4.5 10E3/UL (ref 4–11)

## 2022-04-19 PROCEDURE — 36415 COLL VENOUS BLD VENIPUNCTURE: CPT | Mod: ORL

## 2022-04-19 PROCEDURE — 82607 VITAMIN B-12: CPT | Mod: ORL

## 2022-04-19 PROCEDURE — 85027 COMPLETE CBC AUTOMATED: CPT | Mod: ORL

## 2022-04-19 PROCEDURE — 84443 ASSAY THYROID STIM HORMONE: CPT | Mod: ORL

## 2022-04-19 PROCEDURE — 80053 COMPREHEN METABOLIC PANEL: CPT | Mod: ORL

## 2022-04-19 PROCEDURE — P9604 ONE-WAY ALLOW PRORATED TRIP: HCPCS | Mod: ORL

## 2022-04-24 ENCOUNTER — LAB REQUISITION (OUTPATIENT)
Dept: LAB | Facility: CLINIC | Age: 82
End: 2022-04-24
Payer: COMMERCIAL

## 2022-04-24 DIAGNOSIS — E55.9 VITAMIN D DEFICIENCY, UNSPECIFIED: ICD-10-CM

## 2022-04-24 DIAGNOSIS — F03.918 UNSPECIFIED DEMENTIA WITH BEHAVIORAL DISTURBANCE: ICD-10-CM

## 2022-04-24 DIAGNOSIS — E53.8 DEFICIENCY OF OTHER SPECIFIED B GROUP VITAMINS: ICD-10-CM

## 2022-04-26 PROCEDURE — 82306 VITAMIN D 25 HYDROXY: CPT | Mod: ORL

## 2022-04-26 PROCEDURE — 36415 COLL VENOUS BLD VENIPUNCTURE: CPT | Mod: ORL

## 2022-04-26 PROCEDURE — P9604 ONE-WAY ALLOW PRORATED TRIP: HCPCS | Mod: ORL

## 2022-04-27 LAB — DEPRECATED CALCIDIOL+CALCIFEROL SERPL-MC: 66 UG/L (ref 20–75)

## 2022-06-30 PROCEDURE — 81001 URINALYSIS AUTO W/SCOPE: CPT | Mod: ORL | Performed by: FAMILY MEDICINE

## 2022-07-19 ENCOUNTER — LAB REQUISITION (OUTPATIENT)
Dept: LAB | Facility: CLINIC | Age: 82
End: 2022-07-19
Payer: COMMERCIAL

## 2022-07-19 DIAGNOSIS — R41.0 DISORIENTATION, UNSPECIFIED: ICD-10-CM

## 2022-09-25 ENCOUNTER — HEALTH MAINTENANCE LETTER (OUTPATIENT)
Age: 82
End: 2022-09-25

## 2023-01-01 ENCOUNTER — HEALTH MAINTENANCE LETTER (OUTPATIENT)
Age: 83
End: 2023-01-01